# Patient Record
Sex: FEMALE | Race: WHITE | NOT HISPANIC OR LATINO | Employment: FULL TIME | URBAN - METROPOLITAN AREA
[De-identification: names, ages, dates, MRNs, and addresses within clinical notes are randomized per-mention and may not be internally consistent; named-entity substitution may affect disease eponyms.]

---
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
## 2022-01-10 ENCOUNTER — TELEPHONE (OUTPATIENT)
Dept: OBSTETRICS AND GYNECOLOGY | Facility: CLINIC | Age: 22
End: 2022-01-10
Payer: MEDICAID

## 2022-01-10 NOTE — TELEPHONE ENCOUNTER
----- Message from Micah Rueda sent at 1/10/2022  2:13 PM CST -----  Contact: self  Aye Caceres  MRN: [id number]  Home Phone      [phone]  Work Phone      Not on file.  Mobile          [phone]  Mobile          [phone]    Patient Care Team:  Tiffany Harding NP as PCP - General (Family Medicine)  Elizabeth Hardy LPN as Care Coordinator  OB? Yes, unknown  What phone number can you be reached at? [phone]  Message: Patient is needing to schedule dx preg appt. Please return call

## 2022-01-10 NOTE — TELEPHONE ENCOUNTER
Pt scheduled for 1/12/2022. Pt states she is a transfer ob, instructed to get records transferred.

## 2022-01-11 ENCOUNTER — PATIENT OUTREACH (OUTPATIENT)
Dept: ADMINISTRATIVE | Facility: OTHER | Age: 22
End: 2022-01-11
Payer: MEDICAID

## 2022-01-11 NOTE — PROGRESS NOTES
Health Maintenance Due   Topic Date Due    Hepatitis C Screening  Never done    COVID-19 Vaccine (1) Never done    HPV Vaccines (1 - 2-dose series) Never done    Influenza Vaccine (1) 09/01/2021     Updates were requested from care everywhere.  Chart was reviewed for overdue Proactive [facility] Encounters ([facility]) topics (CRS, Breast Cancer Screening, Eye exam)  Health Maintenance has been updated.  LINKS immunization registry triggered.  Immunizations were reconciled.

## 2022-01-12 ENCOUNTER — PATIENT MESSAGE (OUTPATIENT)
Dept: OBSTETRICS AND GYNECOLOGY | Facility: CLINIC | Age: 22
End: 2022-01-12

## 2022-01-12 ENCOUNTER — OFFICE VISIT (OUTPATIENT)
Dept: OBSTETRICS AND GYNECOLOGY | Facility: CLINIC | Age: 22
End: 2022-01-12
Payer: MEDICAID

## 2022-01-12 VITALS
HEART RATE: 87 BPM | SYSTOLIC BLOOD PRESSURE: 126 MMHG | HEIGHT: 65 IN | WEIGHT: 227.63 LBS | RESPIRATION RATE: 15 BRPM | DIASTOLIC BLOOD PRESSURE: 84 MMHG | BODY MASS INDEX: 37.92 KG/M2

## 2022-01-12 DIAGNOSIS — O99.340 ANXIETY DISORDER AFFECTING PREGNANCY, ANTEPARTUM: ICD-10-CM

## 2022-01-12 DIAGNOSIS — F41.9 ANXIETY DISORDER AFFECTING PREGNANCY, ANTEPARTUM: ICD-10-CM

## 2022-01-12 DIAGNOSIS — Z32.01 PREGNANCY EXAMINATION OR TEST, POSITIVE RESULT: Primary | ICD-10-CM

## 2022-01-12 DIAGNOSIS — O09.299 HISTORY OF PRE-ECLAMPSIA IN PRIOR PREGNANCY, CURRENTLY PREGNANT: ICD-10-CM

## 2022-01-12 PROCEDURE — 3079F DIAST BP 80-89 MM HG: CPT | Mod: CPTII,,, | Performed by: OBSTETRICS & GYNECOLOGY

## 2022-01-12 PROCEDURE — 99999 PR PBB SHADOW E&M-EST. PATIENT-LVL III: CPT | Mod: PBBFAC,,, | Performed by: OBSTETRICS & GYNECOLOGY

## 2022-01-12 PROCEDURE — 3008F PR BODY MASS INDEX (BMI) DOCUMENTED: ICD-10-PCS | Mod: CPTII,,, | Performed by: OBSTETRICS & GYNECOLOGY

## 2022-01-12 PROCEDURE — 1160F RVW MEDS BY RX/DR IN RCRD: CPT | Mod: CPTII,,, | Performed by: OBSTETRICS & GYNECOLOGY

## 2022-01-12 PROCEDURE — 3008F BODY MASS INDEX DOCD: CPT | Mod: CPTII,,, | Performed by: OBSTETRICS & GYNECOLOGY

## 2022-01-12 PROCEDURE — 1160F PR REVIEW ALL MEDS BY PRESCRIBER/CLIN PHARMACIST DOCUMENTED: ICD-10-PCS | Mod: CPTII,,, | Performed by: OBSTETRICS & GYNECOLOGY

## 2022-01-12 PROCEDURE — 3074F PR MOST RECENT SYSTOLIC BLOOD PRESSURE < 130 MM HG: ICD-10-PCS | Mod: CPTII,,, | Performed by: OBSTETRICS & GYNECOLOGY

## 2022-01-12 PROCEDURE — 1159F PR MEDICATION LIST DOCUMENTED IN MEDICAL RECORD: ICD-10-PCS | Mod: CPTII,,, | Performed by: OBSTETRICS & GYNECOLOGY

## 2022-01-12 PROCEDURE — 99213 OFFICE O/P EST LOW 20 MIN: CPT | Mod: PBBFAC,TH | Performed by: OBSTETRICS & GYNECOLOGY

## 2022-01-12 PROCEDURE — 1159F MED LIST DOCD IN RCRD: CPT | Mod: CPTII,,, | Performed by: OBSTETRICS & GYNECOLOGY

## 2022-01-12 PROCEDURE — 99204 PR OFFICE/OUTPT VISIT, NEW, LEVL IV, 45-59 MIN: ICD-10-PCS | Mod: S$PBB,TH,, | Performed by: OBSTETRICS & GYNECOLOGY

## 2022-01-12 PROCEDURE — 3074F SYST BP LT 130 MM HG: CPT | Mod: CPTII,,, | Performed by: OBSTETRICS & GYNECOLOGY

## 2022-01-12 PROCEDURE — 99999 PR PBB SHADOW E&M-EST. PATIENT-LVL III: ICD-10-PCS | Mod: PBBFAC,,, | Performed by: OBSTETRICS & GYNECOLOGY

## 2022-01-12 PROCEDURE — 3079F PR MOST RECENT DIASTOLIC BLOOD PRESSURE 80-89 MM HG: ICD-10-PCS | Mod: CPTII,,, | Performed by: OBSTETRICS & GYNECOLOGY

## 2022-01-12 PROCEDURE — 99204 OFFICE O/P NEW MOD 45 MIN: CPT | Mod: S$PBB,TH,, | Performed by: OBSTETRICS & GYNECOLOGY

## 2022-01-12 RX ORDER — SERTRALINE HYDROCHLORIDE 100 MG/1
50 TABLET, FILM COATED ORAL DAILY
Qty: 30 TABLET | Refills: 4 | Status: SHIPPED | OUTPATIENT
Start: 2022-01-12 | End: 2022-08-31

## 2022-01-12 RX ORDER — NAPROXEN SODIUM 220 MG/1
81 TABLET, FILM COATED ORAL DAILY
Qty: 30 TABLET | Refills: 10 | Status: SHIPPED | OUTPATIENT
Start: 2022-01-12 | End: 2022-07-20

## 2022-01-12 RX ORDER — ONDANSETRON 4 MG/1
4 TABLET, FILM COATED ORAL EVERY 8 HOURS PRN
COMMUNITY
Start: 2022-01-10

## 2022-01-12 RX ORDER — SERTRALINE HYDROCHLORIDE 50 MG/1
50 TABLET, FILM COATED ORAL DAILY
COMMUNITY
Start: 2022-01-05 | End: 2022-01-12 | Stop reason: SDUPTHER

## 2022-01-12 RX ORDER — LACTOBACILLUS COMBINATION NO.9 4B CELL
CAPSULE ORAL
COMMUNITY
End: 2023-01-17

## 2022-01-12 NOTE — PROGRESS NOTES
Subjective:   Patient ID: Aye Caceres is a 21 y.o. y.o. female.     Chief Complaint: Missed Menses       History of Present Illness:    Aye presents today complaining of amenorrhea. No LMP recorded. Patient is pregnant.. Prior menstrual cycles have been regular. She reports breast tenderness, fatigue, frequent urination, nausea and positive home pregnancy test. UPT is positive.   She has had initial OB care with Dr. Wiggins.  Reports normal bloodwork and pre-e labs. Reports ultrasound with EDC of 22.   She has a h/o anxiety and is on Zoloft, but is having increased anxiety. She is interested in increasing the dose.   Prior pregnancy with severe pre-e and emergent delivery by  at 30 WGA.     Past Medical History:   Diagnosis Date    Asthma      Past Surgical History:   Procedure Laterality Date     SECTION       Social History     Socioeconomic History    Marital status: Single   Tobacco Use    Smoking status: Never Smoker    Smokeless tobacco: Never Used   Substance and Sexual Activity    Alcohol use: No    Drug use: No    Sexual activity: Yes     Partners: Male     Birth control/protection: None     Comment: single      Family History   Problem Relation Age of Onset    Breast cancer Neg Hx     Colon cancer Neg Hx     Ovarian cancer Neg Hx      OB History    Para Term  AB Living   2 1 0 1 0 1   SAB IAB Ectopic Multiple Live Births   0 0 0 0 1      # Outcome Date GA Lbr Quinten/2nd Weight Sex Delivery Anes PTL Lv   2 Current            1  21 30w0d  1.247 kg (2 lb 12 oz) F CS-LTranv  Y MCKENZIE         ROS:   Review of Systems   Constitutional: Positive for fatigue. Negative for chills, diaphoresis, fever and unexpected weight change.   HENT: Negative for congestion, hearing loss, rhinorrhea and sore throat.    Eyes: Negative for pain, discharge and visual disturbance.   Respiratory: Negative for apnea, cough, shortness of breath and wheezing.     Cardiovascular: Negative for chest pain, palpitations and leg swelling.   Gastrointestinal: Positive for nausea. Negative for abdominal pain, constipation, diarrhea and vomiting.   Endocrine: Negative for cold intolerance and heat intolerance.   Genitourinary: Negative for difficulty urinating, dyspareunia, dysuria, flank pain, frequency, genital sores, hematuria, menstrual problem, pelvic pain, vaginal bleeding, vaginal discharge and vaginal pain.   Musculoskeletal: Negative for arthralgias, back pain and joint swelling.   Skin: Negative for rash.   Neurological: Negative for dizziness, weakness, light-headedness, numbness and headaches.   Psychiatric/Behavioral: Negative for agitation and confusion. The patient is nervous/anxious.            Objective:   Vital Signs:  Vitals:    01/12/22 1459   BP: 126/84   Pulse: 87   Resp: 15       Physical Exam  Vitals reviewed.   Constitutional:       General: She is not in acute distress.     Appearance: She is well-developed and well-nourished.   HENT:      Head: Normocephalic and atraumatic.   Eyes:      Conjunctiva/sclera: Conjunctivae normal.   Pulmonary:      Effort: Pulmonary effort is normal.   Abdominal:      Palpations: Abdomen is soft.      Tenderness: There is no abdominal tenderness. There is no guarding or rebound.   Musculoskeletal:      Cervical back: Normal range of motion and neck supple.      Right lower leg: No edema.      Left lower leg: No edema.   Skin:     General: Skin is warm and dry.   Neurological:      Mental Status: She is alert and oriented to person, place, and time.   Psychiatric:         Mood and Affect: Mood and affect normal.         Behavior: Behavior normal.         Thought Content: Thought content normal.         Judgment: Judgment normal.           Assessment:      1. Pregnancy examination or test, positive result    2. History of pre-eclampsia in prior pregnancy, currently pregnant    3. Anxiety disorder affecting pregnancy,  antepartum          Plan:        Pregnancy examination or test, positive result  -     aspirin 81 MG Chew; Take 1 tablet (81 mg total) by mouth once daily.  Dispense: 30 tablet; Refill: 10    History of pre-eclampsia in prior pregnancy, currently pregnant  -     aspirin 81 MG Chew; Take 1 tablet (81 mg total) by mouth once daily.  Dispense: 30 tablet; Refill: 10    Anxiety disorder affecting pregnancy, antepartum  -     sertraline (ZOLOFT) 100 MG tablet; Take 0.5 tablets (50 mg total) by mouth once daily.  Dispense: 30 tablet; Refill: 4        1. Records requested from prior OB  2. CT was positive per her report.  She and partner have been treated.  3. Pt reports ultrasound with EDC of 8/1/22  4. Start ASA 81 at 12 WGA.  5. Follow up in 4 weeks after ultrasound.    Patient was counseled today on proper weight gain based on the [place] [employer]'s recommendations based on her pre-pregnancy weight. Discussed foods to avoid in pregnancy (i.e. sushi, fish that are high in mercury, deli meat, and unpasteurized cheeses). Discussed prenatal vitamin options (i.e. stool softener, DHA). She was also counseled on safe, healthy behavior as well as medications safe in pregnancy.

## 2022-01-18 ENCOUNTER — LAB VISIT (OUTPATIENT)
Dept: PRIMARY CARE CLINIC | Facility: OTHER | Age: 22
End: 2022-01-18
Attending: INTERNAL MEDICINE
Payer: MEDICAID

## 2022-01-18 DIAGNOSIS — U07.1 COVID-19: Primary | ICD-10-CM

## 2022-01-18 LAB
CTP QC/QA: YES
SARS-COV-2 AG RESP QL IA.RAPID: NEGATIVE

## 2022-01-18 PROCEDURE — 87811 SARS-COV-2 COVID19 W/OPTIC: CPT

## 2022-01-18 NOTE — PROGRESS NOTES
Instructions for Patients with Confirmed or Suspected COVID-19    If you are awaiting your test result, you will either be called or it will be released to the patient portal.  If you have any questions about your test, please visit [email or web address] or call our COVID-19 information line at [phone].      Please isolate yourself at home.  You may leave home and/or return to work once the following conditions are met:    If you have symptoms and tested positive:   More than 5 days since symptoms first appeared AND   More than 24 hours fever free without medications AND       symptoms have improved   · For five days after ending isolation, masks are required.    If you had no symptoms but tested positive:   More than 5 days since the date of the first positive test. If you develop symptoms, then use the guidelines above  · For five days after ending isolation, masks are required.      Testing is not recommended if you are symptom free after completing isolation.

## 2022-01-19 ENCOUNTER — TELEPHONE (OUTPATIENT)
Dept: OBSTETRICS AND GYNECOLOGY | Facility: CLINIC | Age: 22
End: 2022-01-19
Payer: MEDICAID

## 2022-01-19 NOTE — TELEPHONE ENCOUNTER
Informed pt that we have not received her records from Dr. Wiggins. Informed pt that she can call the [facility] medical records to get them sent to us, pt v/u.

## 2022-01-19 NOTE — TELEPHONE ENCOUNTER
----- Message from Monica Hoover MA sent at 1/19/2022  2:41 PM CST -----  Contact: self  Aye Caceres  MRN: [id number]  Home Phone      [phone]  Work Phone      Not on file.  Mobile          [phone]  Mobile          [phone]    Patient Care Team:  Tiffany Harding NP as PCP - General (Family Medicine)  Elizabeth Hardy LPN as Care Coordinator  OB? Yes, 12w2d  What phone number can you be reached at? [phone]  Message:  Would like to know if we received medical records from previous ob doctor.

## 2022-02-10 ENCOUNTER — ROUTINE PRENATAL (OUTPATIENT)
Dept: OBSTETRICS AND GYNECOLOGY | Facility: CLINIC | Age: 22
End: 2022-02-10
Payer: MEDICAID

## 2022-02-10 ENCOUNTER — PATIENT MESSAGE (OUTPATIENT)
Dept: ADMINISTRATIVE | Facility: HOSPITAL | Age: 22
End: 2022-02-10
Payer: MEDICAID

## 2022-02-10 ENCOUNTER — LAB VISIT (OUTPATIENT)
Dept: LAB | Facility: HOSPITAL | Age: 22
End: 2022-02-10
Attending: OBSTETRICS & GYNECOLOGY
Payer: MEDICAID

## 2022-02-10 VITALS
HEART RATE: 65 BPM | BODY MASS INDEX: 37.97 KG/M2 | WEIGHT: 228.19 LBS | DIASTOLIC BLOOD PRESSURE: 72 MMHG | SYSTOLIC BLOOD PRESSURE: 104 MMHG

## 2022-02-10 DIAGNOSIS — O09.899 HISTORY OF MATERNAL CHLAMYDIA INFECTION, CURRENTLY PREGNANT: ICD-10-CM

## 2022-02-10 DIAGNOSIS — Z36.9 ANTENATAL SCREENING ENCOUNTER: ICD-10-CM

## 2022-02-10 DIAGNOSIS — Z34.82 ENCOUNTER FOR SUPERVISION OF OTHER NORMAL PREGNANCY, SECOND TRIMESTER: ICD-10-CM

## 2022-02-10 DIAGNOSIS — Z34.82 ENCOUNTER FOR SUPERVISION OF OTHER NORMAL PREGNANCY, SECOND TRIMESTER: Primary | ICD-10-CM

## 2022-02-10 PROCEDURE — 99212 OFFICE O/P EST SF 10 MIN: CPT | Mod: PBBFAC,TH | Performed by: OBSTETRICS & GYNECOLOGY

## 2022-02-10 PROCEDURE — 87491 CHLMYD TRACH DNA AMP PROBE: CPT | Performed by: OBSTETRICS & GYNECOLOGY

## 2022-02-10 PROCEDURE — 87591 N.GONORRHOEAE DNA AMP PROB: CPT | Performed by: OBSTETRICS & GYNECOLOGY

## 2022-02-10 PROCEDURE — 99999 PR PBB SHADOW E&M-EST. PATIENT-LVL II: CPT | Mod: PBBFAC,,, | Performed by: OBSTETRICS & GYNECOLOGY

## 2022-02-10 PROCEDURE — 99213 PR OFFICE/OUTPT VISIT, EST, LEVL III, 20-29 MIN: ICD-10-PCS | Mod: TH,S$PBB,, | Performed by: OBSTETRICS & GYNECOLOGY

## 2022-02-10 PROCEDURE — 99213 OFFICE O/P EST LOW 20 MIN: CPT | Mod: TH,S$PBB,, | Performed by: OBSTETRICS & GYNECOLOGY

## 2022-02-10 PROCEDURE — 36415 COLL VENOUS BLD VENIPUNCTURE: CPT | Performed by: OBSTETRICS & GYNECOLOGY

## 2022-02-10 PROCEDURE — 81511 FTL CGEN ABNOR FOUR ANAL: CPT | Performed by: OBSTETRICS & GYNECOLOGY

## 2022-02-10 PROCEDURE — 99999 PR PBB SHADOW E&M-EST. PATIENT-LVL II: ICD-10-PCS | Mod: PBBFAC,,, | Performed by: OBSTETRICS & GYNECOLOGY

## 2022-02-10 NOTE — PROGRESS NOTES
Patient doing well. No vaginal bleeding or cramping noted. Discussed the need for an anatomy scan between 19-20 weeks. Discussed quad screen.  Pt with low blood sugar in the evening, will increase snacking at that time.    Records requested from prior OB.   On ASA81  CT NICCI today.   RTC in 4 weeks.    Vitals signs, FHTs, urine dip, and PE findings documented, reviewed and available in OB flow chart.       I spent a total of 20 minutes on the day of the visit.This includes face to face time and non-face to face time preparing to see the patient (eg, review of tests), Obtaining and/or reviewing separately obtained history, Documenting clinical information in the electronic or other health record, Independently interpreting resultsand communicating results to the patient/family/caregiver, or Care coordination.      Coffective counseling sheet Get Ready discussed with mother. Reinforced avoiding induction of labor unless medically indicated as well as comfort measures during labor.  Encouraged mother to download Coffective mobile harman if she has not already done so. Mother verbalizes understanding.

## 2022-02-14 ENCOUNTER — OFFICE VISIT (OUTPATIENT)
Dept: URGENT CARE | Facility: CLINIC | Age: 22
End: 2022-02-14
Payer: MEDICAID

## 2022-02-14 VITALS
DIASTOLIC BLOOD PRESSURE: 67 MMHG | RESPIRATION RATE: 16 BRPM | BODY MASS INDEX: 37.99 KG/M2 | WEIGHT: 228 LBS | SYSTOLIC BLOOD PRESSURE: 105 MMHG | HEIGHT: 65 IN | OXYGEN SATURATION: 98 % | TEMPERATURE: 98 F | HEART RATE: 90 BPM

## 2022-02-14 DIAGNOSIS — R09.81 NASAL CONGESTION: Primary | ICD-10-CM

## 2022-02-14 LAB
# FETUSES US: NORMAL
2ND TRIMESTER 4 SCREEN PNL SERPL: NEGATIVE
2ND TRIMESTER 4 SCREEN SERPL-IMP: NORMAL
AFP MOM SERPL: 0.88
AFP SERPL-MCNC: 21.4 NG/ML
AGE AT DELIVERY: 20
B-HCG MOM SERPL: 0.82
B-HCG SERPL-ACNC: 30.6 IU/ML
C TRACH DNA SPEC QL NAA+PROBE: NOT DETECTED
CTP QC/QA: YES
FET TS 21 RISK FROM MAT AGE: NORMAL
GA (DAYS): 3 D
GA (WEEKS): 15 WK
GA METHOD: NORMAL
IDDM PATIENT QL: NORMAL
INHIBIN A MOM SERPL: 0.67
INHIBIN A SERPL-MCNC: 84.1 PG/ML
N GONORRHOEA DNA SPEC QL NAA+PROBE: NOT DETECTED
SARS-COV-2 RDRP RESP QL NAA+PROBE: NEGATIVE
SMOKING STATUS FTND: NORMAL
TS 18 RISK FETUS: NORMAL
TS 21 RISK FETUS: NORMAL
U ESTRIOL MOM SERPL: 1.1
U ESTRIOL SERPL-MCNC: 0.77 NG/ML

## 2022-02-14 PROCEDURE — 99213 OFFICE O/P EST LOW 20 MIN: CPT | Mod: S$GLB,,, | Performed by: FAMILY MEDICINE

## 2022-02-14 PROCEDURE — 3078F DIAST BP <80 MM HG: CPT | Mod: CPTII,S$GLB,, | Performed by: FAMILY MEDICINE

## 2022-02-14 PROCEDURE — U0002 COVID-19 LAB TEST NON-CDC: HCPCS | Mod: QW,S$GLB,, | Performed by: FAMILY MEDICINE

## 2022-02-14 PROCEDURE — 3008F PR BODY MASS INDEX (BMI) DOCUMENTED: ICD-10-PCS | Mod: CPTII,S$GLB,, | Performed by: FAMILY MEDICINE

## 2022-02-14 PROCEDURE — U0002: ICD-10-PCS | Mod: QW,S$GLB,, | Performed by: FAMILY MEDICINE

## 2022-02-14 PROCEDURE — 99213 PR OFFICE/OUTPT VISIT, EST, LEVL III, 20-29 MIN: ICD-10-PCS | Mod: S$GLB,,, | Performed by: FAMILY MEDICINE

## 2022-02-14 PROCEDURE — 3008F BODY MASS INDEX DOCD: CPT | Mod: CPTII,S$GLB,, | Performed by: FAMILY MEDICINE

## 2022-02-14 PROCEDURE — 3078F PR MOST RECENT DIASTOLIC BLOOD PRESSURE < 80 MM HG: ICD-10-PCS | Mod: CPTII,S$GLB,, | Performed by: FAMILY MEDICINE

## 2022-02-14 PROCEDURE — 1159F PR MEDICATION LIST DOCUMENTED IN MEDICAL RECORD: ICD-10-PCS | Mod: CPTII,S$GLB,, | Performed by: FAMILY MEDICINE

## 2022-02-14 PROCEDURE — 3074F SYST BP LT 130 MM HG: CPT | Mod: CPTII,S$GLB,, | Performed by: FAMILY MEDICINE

## 2022-02-14 PROCEDURE — 3074F PR MOST RECENT SYSTOLIC BLOOD PRESSURE < 130 MM HG: ICD-10-PCS | Mod: CPTII,S$GLB,, | Performed by: FAMILY MEDICINE

## 2022-02-14 PROCEDURE — 1160F RVW MEDS BY RX/DR IN RCRD: CPT | Mod: CPTII,S$GLB,, | Performed by: FAMILY MEDICINE

## 2022-02-14 PROCEDURE — 1159F MED LIST DOCD IN RCRD: CPT | Mod: CPTII,S$GLB,, | Performed by: FAMILY MEDICINE

## 2022-02-14 PROCEDURE — 1160F PR REVIEW ALL MEDS BY PRESCRIBER/CLIN PHARMACIST DOCUMENTED: ICD-10-PCS | Mod: CPTII,S$GLB,, | Performed by: FAMILY MEDICINE

## 2022-02-14 RX ORDER — FLUTICASONE PROPIONATE 50 MCG
1 SPRAY, SUSPENSION (ML) NASAL DAILY
Qty: 9.9 ML | Refills: 0 | Status: SHIPPED | OUTPATIENT
Start: 2022-02-14 | End: 2022-08-31

## 2022-02-14 NOTE — PATIENT INSTRUCTIONS
Patient Education       Viral Upper Respiratory Infection Discharge Instructions, Adult   About this topic   You have an upper respiratory infection or URI. A URI can affect your nose, throat, ears, and sinuses. A virus is the cause of almost all URIs and antibiotics will not help you feel better more quickly. The common cold is an example of a viral URI.  URIs are easy to spread from person to person, most often through coughing or sneezing. A URI will almost always get better in a week or two without any treatment.         What care is needed at home?   · Ask your doctor what you need to do when you go home. Make sure you ask questions if you do not understand what the doctor says.  · If you smoke, try to quit. Your doctor or nurse can help.  · Drink lots of fluids like water, juice, or broth. This will help replace any fluids lost if you have a runny nose or fever. Warm tea or soup can help soothe a sore throat.  · If the air in your home feels dry, use a cool mist humidifier. This can help a stuffy nose and make it easier to breathe.  · You can also use saline nose drops to relieve stuffiness.  · If you decide to take over-the-counter cough or cold medicines, follow the directions on the label carefully. Be sure you do not take more than 1 medicine that contains acetaminophen. Also, if you have a heart problem or high blood pressure, check with your doctor before you take any of these medicines.  · Wash your hands often. Cough or sneeze into a tissue or your elbow instead of your hands. This will help keep others healthy.  What follow-up care is needed?   Your doctor may ask you to make visits to the office to check on your progress. Be sure to keep these visits.  What drugs may be needed?   The doctor may order drugs to:  · Open up the tubes of your lungs  · Treat viral infection  · Relieve or stop coughing  · Help with pain from a sore throat  · Relieve runny and stuffy nose  · Provide oxygen  Will physical  activity be limited?   You need to rest for a few days to let your body recover from the infection.  What changes to diet are needed?   Eat soft foods like soup if swallowing is too painful.  What problems could happen?   · Asthma attack  · Sinus infections  · Lung problems like pneumonia and bronchitis  · Severe fluid loss. This is dehydration.  What can be done to prevent this health problem?   · Wash your hands often with soap and water for at least 20 seconds, especially after coughing or sneezing. Alcohol-based hand sanitizers also work to kill the virus.  · If you are sick, cover your mouth and nose with tissue when you cough or sneeze. You can also cough into your elbow. Throw away tissues in the trash and wash your hands after touching used tissues.  · Do not get too close (kissing, hugging) to people who are sick.  · Do not share towels or hankies with anyone who is sick. Clean commonly handled things like door handles, remotes, toys, and phones. Wipe them with a disinfectant.  · Stay away from crowded places.  · Cover your nose and mouth when you sneeze or cough.  · Take vitamin C to help build up your body's ability to fight disease.  · Get a flu shot each year.  When do I need to call the doctor?   · You have trouble breathing when talking or sitting still.  · You have a fever of 100.4°F (38°C) or higher for several days, chills, a very bad sore throat, or ear or sinus pain.  · You develop a new fever after several days of feeling the same or improving.  · You develop chest pain when you cough.  · You have a cough that lasts more than 10 days.  · You cough up blood, or the color of the mucus you cough up changes.  Teach Back: Helping You Understand   The Teach Back Method helps you understand the information we are giving you. After you talk with the staff, tell them in your own words what you learned. This helps to make sure the staff has described each thing clearly. It also helps to explain things  that may have been confusing. Before going home, make sure you can do these:  · I can tell you about my condition.  · I can tell you what may help ease my signs.  · I can tell you what I will do if I have a fever, chills, breathing very fast, or trouble breathing.  Where can I learn more?   American Lung Association  https://www.lung.org/blog/can-you-exercise-with-a-cold   American Lung Association  https://www.lung.org/lung-health-diseases/lung-disease-lookup/influenza/facts-about-the-common-cold   NHS Choices  https://www.nhs.uk/conditions/respiratory-tract-infection/   UpToDate  https://www.[employer]/contents/the-common-cold-in-adults-beyond-the-basics   Last Reviewed Date   2021-06-08  Consumer Information Use and Disclaimer   This information is not specific medical advice and does not replace information you receive from your health care provider. This is only a brief summary of general information. It does NOT include all information about conditions, illnesses, injuries, tests, procedures, treatments, therapies, discharge instructions or life-style choices that may apply to you. You must talk with your health care provider for complete information about your health and treatment options. This information should not be used to decide whether or not to accept your health care providers advice, instructions or recommendations. Only your health care provider has the knowledge and training to provide advice that is right for you.  Copyright   Copyright © 2021 UpToDate, Inc. and its affiliates and/or licensors. All rights reserved.

## 2022-02-14 NOTE — PROGRESS NOTES
"Subjective:       Patient ID: Aye Caceres is a 21 y.o. female.    Vitals:  height is 5' 5" (1.651 m) and weight is 103.4 kg (228 lb). Her temperature is 98.2 °F (36.8 °C). Her blood pressure is 105/67 and her pulse is 90. Her respiration is 16 and oxygen saturation is 98%.     Chief Complaint: Nasal Congestion (Entered by patient)    Pt presents nasal congestion since Saturday    Sinus Problem  This is a new problem. The current episode started in the past 7 days. The problem is unchanged. There has been no fever. Her pain is at a severity of 0/10. Associated symptoms include congestion. Treatments tried: Mucinex. The treatment provided no relief.       HENT: Positive for congestion.        Objective:      Physical Exam   Constitutional: She does not appear ill. No distress. obesity  HENT:   Nose: Rhinorrhea (clear) and congestion present.   Mouth/Throat: Mucous membranes are moist. No posterior oropharyngeal erythema.   Eyes: Pupils are equal, round, and reactive to light.      extraocular movement intact   Neck: Neck supple.   Cardiovascular: Normal rate, regular rhythm, normal heart sounds and normal pulses.   Pulmonary/Chest: Effort normal and breath sounds normal.   Abdominal: Normal appearance.   Neurological: She is alert.   Nursing note and vitals reviewed.    Results for orders placed or performed in visit on 02/14/22   POCT COVID-19 Rapid Screening   Result Value Ref Range    POC Rapid COVID Negative Negative     Acceptable Yes          Assessment:       1. Nasal congestion          Plan:         Nasal congestion  -     POCT COVID-19 Rapid Screening  -     fluticasone propionate (FLONASE) 50 mcg/actuation nasal spray; 1 spray (50 mcg total) by Each Nostril route once daily.  Dispense: 9.9 mL; Refill: 0    to discuss management for symptoms with OB               "

## 2022-02-14 NOTE — PROGRESS NOTES
Prenatal records obtained.    9.8>11.7/35.0<294  Utox negative  Urine culture negative  Urine P/C ratio 0.0  Chlamydia positive  Gonorrhea negative  RPR negative  HIV negative  Hep C negative  Hep B negative  A+/1  Rubella immune

## 2022-03-10 ENCOUNTER — PROCEDURE VISIT (OUTPATIENT)
Dept: OBSTETRICS AND GYNECOLOGY | Facility: CLINIC | Age: 22
End: 2022-03-10
Payer: MEDICAID

## 2022-03-10 ENCOUNTER — ROUTINE PRENATAL (OUTPATIENT)
Dept: OBSTETRICS AND GYNECOLOGY | Facility: CLINIC | Age: 22
End: 2022-03-10
Payer: MEDICAID

## 2022-03-10 VITALS
SYSTOLIC BLOOD PRESSURE: 112 MMHG | WEIGHT: 225.13 LBS | BODY MASS INDEX: 37.46 KG/M2 | DIASTOLIC BLOOD PRESSURE: 76 MMHG | HEART RATE: 82 BPM

## 2022-03-10 DIAGNOSIS — Z34.82 ENCOUNTER FOR SUPERVISION OF OTHER NORMAL PREGNANCY, SECOND TRIMESTER: ICD-10-CM

## 2022-03-10 DIAGNOSIS — Z34.82 ENCOUNTER FOR SUPERVISION OF OTHER NORMAL PREGNANCY, SECOND TRIMESTER: Primary | ICD-10-CM

## 2022-03-10 DIAGNOSIS — Z36.2 ENCOUNTER FOR FOLLOW-UP ULTRASOUND OF FETAL ANATOMY: ICD-10-CM

## 2022-03-10 DIAGNOSIS — Z3A.19 19 WEEKS GESTATION OF PREGNANCY: ICD-10-CM

## 2022-03-10 DIAGNOSIS — O09.292 HISTORY OF PRE-ECLAMPSIA IN PRIOR PREGNANCY, CURRENTLY PREGNANT IN SECOND TRIMESTER: ICD-10-CM

## 2022-03-10 PROCEDURE — 99999 PR PBB SHADOW E&M-EST. PATIENT-LVL III: ICD-10-PCS | Mod: PBBFAC,,, | Performed by: OBSTETRICS & GYNECOLOGY

## 2022-03-10 PROCEDURE — 99213 OFFICE O/P EST LOW 20 MIN: CPT | Mod: PBBFAC,TH | Performed by: OBSTETRICS & GYNECOLOGY

## 2022-03-10 PROCEDURE — 99213 PR OFFICE/OUTPT VISIT, EST, LEVL III, 20-29 MIN: ICD-10-PCS | Mod: TH,S$PBB,, | Performed by: OBSTETRICS & GYNECOLOGY

## 2022-03-10 PROCEDURE — 76805 OB US >/= 14 WKS SNGL FETUS: CPT | Mod: PBBFAC | Performed by: OBSTETRICS & GYNECOLOGY

## 2022-03-10 PROCEDURE — 99999 PR PBB SHADOW E&M-EST. PATIENT-LVL III: CPT | Mod: PBBFAC,,, | Performed by: OBSTETRICS & GYNECOLOGY

## 2022-03-10 PROCEDURE — 76805 US OB/GYN EXTENDED PROCEDURE (VIEWPOINT): ICD-10-PCS | Mod: 26,S$PBB,, | Performed by: OBSTETRICS & GYNECOLOGY

## 2022-03-10 PROCEDURE — 99213 OFFICE O/P EST LOW 20 MIN: CPT | Mod: TH,S$PBB,, | Performed by: OBSTETRICS & GYNECOLOGY

## 2022-03-10 NOTE — PROGRESS NOTES
Patient with no complaints. Denies vaginal bleeding or cramping.  Good FM. Discussed with patient having glucose testing for gestational diabetes preformed between 24-28 weeks.   ASA81 for h/o pre-e  Will schedule f/u anatomy in 4 weeks.   Requesting records from [facility]     RTC in 4 weeks.     Vitals signs, FHTs, urine dip, and PE findings documented, reviewed and available in OB flow chart.       I spent a total of 20 minutes on the day of the visit.This includes face to face time and non-face to face time preparing to see the patient (eg, review of tests), Obtaining and/or reviewing separately obtained history, Documenting clinical information in the electronic or other health record, Independently interpreting resultsand communicating results to the patient/family/caregiver, or Care coordination.     Coffective counseling sheet Learn Your Baby and Protect Breastfeeding discussed with mother. Instructed regarding feeding cues and methods to calm baby. Encouraged mother to download Coffective mobile harman if she has not already done so.  Mother verbalized understanding.

## 2022-04-04 ENCOUNTER — PATIENT MESSAGE (OUTPATIENT)
Dept: ADMINISTRATIVE | Facility: HOSPITAL | Age: 22
End: 2022-04-04
Payer: MEDICAID

## 2022-04-07 ENCOUNTER — ROUTINE PRENATAL (OUTPATIENT)
Dept: OBSTETRICS AND GYNECOLOGY | Facility: CLINIC | Age: 22
End: 2022-04-07
Payer: MEDICAID

## 2022-04-07 ENCOUNTER — PROCEDURE VISIT (OUTPATIENT)
Dept: OBSTETRICS AND GYNECOLOGY | Facility: CLINIC | Age: 22
End: 2022-04-07
Payer: MEDICAID

## 2022-04-07 VITALS
HEART RATE: 61 BPM | WEIGHT: 235.38 LBS | SYSTOLIC BLOOD PRESSURE: 140 MMHG | BODY MASS INDEX: 39.17 KG/M2 | DIASTOLIC BLOOD PRESSURE: 82 MMHG

## 2022-04-07 DIAGNOSIS — Z3A.23 23 WEEKS GESTATION OF PREGNANCY: ICD-10-CM

## 2022-04-07 DIAGNOSIS — Z36.2 ENCOUNTER FOR FOLLOW-UP ULTRASOUND OF FETAL ANATOMY: ICD-10-CM

## 2022-04-07 DIAGNOSIS — O99.210 OBESITY IN PREGNANCY: ICD-10-CM

## 2022-04-07 DIAGNOSIS — O09.292 HISTORY OF PRE-ECLAMPSIA IN PRIOR PREGNANCY, CURRENTLY PREGNANT IN SECOND TRIMESTER: ICD-10-CM

## 2022-04-07 DIAGNOSIS — Z36.4 ANTENATAL SCREENING FOR FETAL GROWTH RETARDATION USING ULTRASONICS: ICD-10-CM

## 2022-04-07 DIAGNOSIS — O16.2 ELEVATED BLOOD PRESSURE AFFECTING PREGNANCY IN SECOND TRIMESTER, ANTEPARTUM: ICD-10-CM

## 2022-04-07 DIAGNOSIS — Z34.82 ENCOUNTER FOR SUPERVISION OF OTHER NORMAL PREGNANCY, SECOND TRIMESTER: Primary | ICD-10-CM

## 2022-04-07 PROCEDURE — 99213 OFFICE O/P EST LOW 20 MIN: CPT | Mod: PBBFAC,TH | Performed by: OBSTETRICS & GYNECOLOGY

## 2022-04-07 PROCEDURE — 99213 PR OFFICE/OUTPT VISIT, EST, LEVL III, 20-29 MIN: ICD-10-PCS | Mod: TH,S$PBB,, | Performed by: OBSTETRICS & GYNECOLOGY

## 2022-04-07 PROCEDURE — 76816 OB US FOLLOW-UP PER FETUS: CPT | Mod: 26,S$PBB,, | Performed by: OBSTETRICS & GYNECOLOGY

## 2022-04-07 PROCEDURE — 99999 PR PBB SHADOW E&M-EST. PATIENT-LVL III: ICD-10-PCS | Mod: PBBFAC,,, | Performed by: OBSTETRICS & GYNECOLOGY

## 2022-04-07 PROCEDURE — 76816 PR  US,PREGNANT UTERUS,F/U,TRANSABD APP: ICD-10-PCS | Mod: 26,S$PBB,, | Performed by: OBSTETRICS & GYNECOLOGY

## 2022-04-07 PROCEDURE — 99213 OFFICE O/P EST LOW 20 MIN: CPT | Mod: TH,S$PBB,, | Performed by: OBSTETRICS & GYNECOLOGY

## 2022-04-07 PROCEDURE — 76816 OB US FOLLOW-UP PER FETUS: CPT | Mod: PBBFAC | Performed by: OBSTETRICS & GYNECOLOGY

## 2022-04-07 PROCEDURE — 99999 PR PBB SHADOW E&M-EST. PATIENT-LVL III: CPT | Mod: PBBFAC,,, | Performed by: OBSTETRICS & GYNECOLOGY

## 2022-04-07 NOTE — PROGRESS NOTES
Patient with occ HA and RUQ pain. Will obtain pre-e labs after visit. 24 hour urine ordered.  BP repeated was 128/78.  Pre-e precautions.  Denies vaginal bleeding or cramping.  Good FM. Discussed with patient having glucose testing for gestational diabetes preformed between 24-28 weeks. RTC in 4 weeks.     Vitals signs, FHTs, urine dip, and PE findings documented, reviewed and available in OB flow chart.       I spent a total of 20 minutes on the day of the visit.This includes face to face time and non-face to face time preparing to see the patient (eg, review of tests), Obtaining and/or reviewing separately obtained history, Documenting clinical information in the electronic or other health record, Independently interpreting resultsand communicating results to the patient/family/caregiver, or Care coordination.       I spent a total of 20 minutes on the day of the visit.This includes face to face time and non-face to face time preparing to see the patient (eg, review of tests), Obtaining and/or reviewing separately obtained history, Documenting clinical information in the electronic or other health record, Independently interpreting resultsand communicating results to the patient/family/caregiver, or Care coordination.     Coffective counseling sheet Nourish discussed with mother. Reinforced basic breastfeeding position and latch as well as proper hand expression technique and avoidance of artificial nipples and formula unless medically indicated. Encouraged mother to download Coffective mobile harman if she has not already done so.  Mother verbalizes understanding.

## 2022-04-11 ENCOUNTER — TELEPHONE (OUTPATIENT)
Dept: OBSTETRICS AND GYNECOLOGY | Facility: CLINIC | Age: 22
End: 2022-04-11
Payer: MEDICAID

## 2022-04-11 NOTE — TELEPHONE ENCOUNTER
----- Message from Monica Hoover MA sent at 4/11/2022 10:39 AM CDT -----  Contact: self  Aye Caceres  MRN: [id number]  Home Phone      [phone]  Work Phone      Not on file.  Mobile          [phone]  Mobile          [phone]    Patient Care Team:  Tiffany Harding NP as PCP - General (Family Medicine)  Elizabeth Hardy LPN as Care Coordinator  OB? Yes, 24w0d  What phone number can you be reached at? [phone]  Message: Needs to speak to nurse regarding lab work that is needing to be done and 24 hr urine.

## 2022-04-11 NOTE — TELEPHONE ENCOUNTER
Pt called to ask if she can have her blood drawn at [facility], informed pt that her orders are in the computer and she's able to have it done there. Pt also had c/o fatigue and lightheadedness, informed pt that this is common in pregnancy. Informed pt to avoid standing in one position for a long period of time and to change position slowly such as sitting to standing and to increase fluid intake,pt v/u.

## 2022-04-13 ENCOUNTER — TELEPHONE (OUTPATIENT)
Dept: OBSTETRICS AND GYNECOLOGY | Facility: CLINIC | Age: 22
End: 2022-04-13
Payer: MEDICAID

## 2022-04-13 NOTE — TELEPHONE ENCOUNTER
Please let Aye know that her labs look good - the levels that we worry about for pre-eclampsia were all normal.

## 2022-04-13 NOTE — TELEPHONE ENCOUNTER
----- Message from Monica Hoover MA sent at 4/13/2022  2:48 PM CDT -----  Contact: self  Aye Caceres  MRN: [id number]  Home Phone      [phone]  Work Phone      Not on file.  Mobile          [phone]      Patient Care Team:  Tiffany Harding NP as PCP - General (Family Medicine)  Elizabeth Hardy LPN as Care Coordinator  OB? Yes, 24w2d  What phone number can you be reached at? [phone]  Message: Has questions regarding lab results.

## 2022-05-05 ENCOUNTER — ROUTINE PRENATAL (OUTPATIENT)
Dept: OBSTETRICS AND GYNECOLOGY | Facility: CLINIC | Age: 22
End: 2022-05-05
Payer: MEDICAID

## 2022-05-05 ENCOUNTER — TELEPHONE (OUTPATIENT)
Dept: OBSTETRICS AND GYNECOLOGY | Facility: CLINIC | Age: 22
End: 2022-05-05
Payer: MEDICAID

## 2022-05-05 VITALS
DIASTOLIC BLOOD PRESSURE: 80 MMHG | SYSTOLIC BLOOD PRESSURE: 122 MMHG | HEART RATE: 111 BPM | WEIGHT: 237.88 LBS | BODY MASS INDEX: 39.59 KG/M2

## 2022-05-05 DIAGNOSIS — Z34.82 ENCOUNTER FOR SUPERVISION OF OTHER NORMAL PREGNANCY IN SECOND TRIMESTER: Primary | ICD-10-CM

## 2022-05-05 DIAGNOSIS — O09.292 HISTORY OF PRE-ECLAMPSIA IN PRIOR PREGNANCY, CURRENTLY PREGNANT IN SECOND TRIMESTER: ICD-10-CM

## 2022-05-05 DIAGNOSIS — Z23 NEED FOR DIPHTHERIA-TETANUS-PERTUSSIS (TDAP) VACCINE: ICD-10-CM

## 2022-05-05 DIAGNOSIS — Z3A.27 27 WEEKS GESTATION OF PREGNANCY: ICD-10-CM

## 2022-05-05 PROCEDURE — 99213 OFFICE O/P EST LOW 20 MIN: CPT | Mod: PBBFAC,TH | Performed by: OBSTETRICS & GYNECOLOGY

## 2022-05-05 PROCEDURE — 90715 TDAP VACCINE 7 YRS/> IM: CPT | Mod: PBBFAC

## 2022-05-05 PROCEDURE — 99999 PR PBB SHADOW E&M-EST. PATIENT-LVL III: CPT | Mod: PBBFAC,,, | Performed by: OBSTETRICS & GYNECOLOGY

## 2022-05-05 PROCEDURE — 99213 PR OFFICE/OUTPT VISIT, EST, LEVL III, 20-29 MIN: ICD-10-PCS | Mod: TH,S$PBB,, | Performed by: OBSTETRICS & GYNECOLOGY

## 2022-05-05 PROCEDURE — 99999 PR PBB SHADOW E&M-EST. PATIENT-LVL III: ICD-10-PCS | Mod: PBBFAC,,, | Performed by: OBSTETRICS & GYNECOLOGY

## 2022-05-05 PROCEDURE — 99213 OFFICE O/P EST LOW 20 MIN: CPT | Mod: TH,S$PBB,, | Performed by: OBSTETRICS & GYNECOLOGY

## 2022-05-05 NOTE — PROGRESS NOTES
Patient with no complaints. Denies vaginal bleeding or contractions. Good FM. Tdap discussed and ordered today.  Discussed fetal kick count instructions with patient to monitor fetal movement. RTC in 2 weeks.    Vitals signs, FHTs, urine dip, and PE findings documented, reviewed and available in OB flow chart.       I spent a total of 20 minutes on the day of the visit.This includes face to face time and non-face to face time preparing to see the patient (eg, review of tests), Obtaining and/or reviewing separately obtained history, Documenting clinical information in the electronic or other health record, Independently interpreting resultsand communicating results to the patient/family/caregiver, or Care coordination.     Coffective counseling sheet Keep Baby Close discussed with mother. Reinforced rooming in practices, continued skin to skin, and quiet hours as requested by mother.  Encouraged mother to download Coffective mobile harman if she has not already done so. Mother verbalizes understanding.

## 2022-05-06 ENCOUNTER — LAB VISIT (OUTPATIENT)
Dept: LAB | Facility: HOSPITAL | Age: 22
End: 2022-05-06
Attending: OBSTETRICS & GYNECOLOGY
Payer: MEDICAID

## 2022-05-06 DIAGNOSIS — Z34.82 ENCOUNTER FOR SUPERVISION OF OTHER NORMAL PREGNANCY IN SECOND TRIMESTER: ICD-10-CM

## 2022-05-06 LAB
BASOPHILS # BLD AUTO: 0.03 K/UL (ref 0–0.2)
BASOPHILS NFR BLD: 0.3 % (ref 0–1.9)
DIFFERENTIAL METHOD: ABNORMAL
EOSINOPHIL # BLD AUTO: 0.1 K/UL (ref 0–0.5)
EOSINOPHIL NFR BLD: 0.8 % (ref 0–8)
ERYTHROCYTE [DISTWIDTH] IN BLOOD BY AUTOMATED COUNT: 14.2 % (ref 11.5–14.5)
GLUCOSE SERPL-MCNC: 122 MG/DL (ref 70–140)
HCT VFR BLD AUTO: 32.1 % (ref 37–48.5)
HGB BLD-MCNC: 10 G/DL (ref 12–16)
IMM GRANULOCYTES # BLD AUTO: 0.02 K/UL (ref 0–0.04)
IMM GRANULOCYTES NFR BLD AUTO: 0.2 % (ref 0–0.5)
LYMPHOCYTES # BLD AUTO: 1.9 K/UL (ref 1–4.8)
LYMPHOCYTES NFR BLD: 19.3 % (ref 18–48)
MCH RBC QN AUTO: 26.5 PG (ref 27–31)
MCHC RBC AUTO-ENTMCNC: 31.2 G/DL (ref 32–36)
MCV RBC AUTO: 85 FL (ref 82–98)
MONOCYTES # BLD AUTO: 0.5 K/UL (ref 0.3–1)
MONOCYTES NFR BLD: 5.3 % (ref 4–15)
NEUTROPHILS # BLD AUTO: 7.3 K/UL (ref 1.8–7.7)
NEUTROPHILS NFR BLD: 74.1 % (ref 38–73)
NRBC BLD-RTO: 0 /100 WBC
PLATELET # BLD AUTO: 237 K/UL (ref 150–450)
PMV BLD AUTO: 9.3 FL (ref 9.2–12.9)
RBC # BLD AUTO: 3.78 M/UL (ref 4–5.4)
WBC # BLD AUTO: 9.88 K/UL (ref 3.9–12.7)

## 2022-05-06 PROCEDURE — 85025 COMPLETE CBC W/AUTO DIFF WBC: CPT | Performed by: OBSTETRICS & GYNECOLOGY

## 2022-05-06 PROCEDURE — 36415 COLL VENOUS BLD VENIPUNCTURE: CPT | Performed by: OBSTETRICS & GYNECOLOGY

## 2022-05-06 PROCEDURE — 86803 HEPATITIS C AB TEST: CPT | Performed by: OBSTETRICS & GYNECOLOGY

## 2022-05-06 PROCEDURE — 82950 GLUCOSE TEST: CPT | Performed by: OBSTETRICS & GYNECOLOGY

## 2022-05-12 ENCOUNTER — HOSPITAL ENCOUNTER (OUTPATIENT)
Facility: HOSPITAL | Age: 22
Discharge: HOME | End: 2022-05-12
Attending: OBSTETRICS & GYNECOLOGY | Admitting: OBSTETRICS & GYNECOLOGY
Payer: MEDICAID

## 2022-05-12 ENCOUNTER — PATIENT MESSAGE (OUTPATIENT)
Dept: OBSTETRICS AND GYNECOLOGY | Facility: CLINIC | Age: 22
End: 2022-05-12
Payer: MEDICAID

## 2022-05-12 VITALS
HEIGHT: 65 IN | HEART RATE: 76 BPM | RESPIRATION RATE: 18 BRPM | SYSTOLIC BLOOD PRESSURE: 110 MMHG | DIASTOLIC BLOOD PRESSURE: 58 MMHG | OXYGEN SATURATION: 98 % | BODY MASS INDEX: 39.49 KG/M2 | TEMPERATURE: 97 F | WEIGHT: 237 LBS

## 2022-05-12 PROBLEM — B34.9 VIRAL SYNDROME: Status: ACTIVE | Noted: 2022-05-12

## 2022-05-12 PROBLEM — O26.813 PREGNANCY RELATED FATIGUE IN THIRD TRIMESTER: Status: ACTIVE | Noted: 2022-05-12

## 2022-05-12 LAB
ALBUMIN SERPL BCP-MCNC: 2.8 G/DL (ref 3.5–5.2)
ALP SERPL-CCNC: 91 U/L (ref 55–135)
ALT SERPL W/O P-5'-P-CCNC: <5 U/L (ref 10–44)
ANION GAP SERPL CALC-SCNC: 10 MMOL/L (ref 8–16)
AST SERPL-CCNC: 10 U/L (ref 10–40)
BASOPHILS # BLD AUTO: 0.02 K/UL (ref 0–0.2)
BASOPHILS NFR BLD: 0.3 % (ref 0–1.9)
BILIRUB SERPL-MCNC: 0.2 MG/DL (ref 0.1–1)
BILIRUB UR QL STRIP: NEGATIVE
BUN SERPL-MCNC: 7 MG/DL (ref 6–20)
CALCIUM SERPL-MCNC: 8.8 MG/DL (ref 8.7–10.5)
CHLORIDE SERPL-SCNC: 105 MMOL/L (ref 95–110)
CLARITY UR: CLEAR
CO2 SERPL-SCNC: 21 MMOL/L (ref 23–29)
COLOR UR: YELLOW
CREAT SERPL-MCNC: 0.5 MG/DL (ref 0.5–1.4)
CREAT UR-MCNC: 186.2 MG/DL (ref 15–325)
DIFFERENTIAL METHOD: ABNORMAL
EOSINOPHIL # BLD AUTO: 0 K/UL (ref 0–0.5)
EOSINOPHIL NFR BLD: 0.3 % (ref 0–8)
ERYTHROCYTE [DISTWIDTH] IN BLOOD BY AUTOMATED COUNT: 14.2 % (ref 11.5–14.5)
EST. GFR  (AFRICAN AMERICAN): >60 ML/MIN/1.73 M^2
EST. GFR  (NON AFRICAN AMERICAN): >60 ML/MIN/1.73 M^2
GLUCOSE SERPL-MCNC: 89 MG/DL (ref 70–110)
GLUCOSE UR QL STRIP: NEGATIVE
HCT VFR BLD AUTO: 30.9 % (ref 37–48.5)
HCV AB SERPL QL IA: NEGATIVE
HGB BLD-MCNC: 9.7 G/DL (ref 12–16)
HGB UR QL STRIP: NEGATIVE
IMM GRANULOCYTES # BLD AUTO: 0.02 K/UL (ref 0–0.04)
IMM GRANULOCYTES NFR BLD AUTO: 0.3 % (ref 0–0.5)
KETONES UR QL STRIP: NEGATIVE
LEUKOCYTE ESTERASE UR QL STRIP: NEGATIVE
LYMPHOCYTES # BLD AUTO: 0.9 K/UL (ref 1–4.8)
LYMPHOCYTES NFR BLD: 12.3 % (ref 18–48)
MCH RBC QN AUTO: 26.5 PG (ref 27–31)
MCHC RBC AUTO-ENTMCNC: 31.4 G/DL (ref 32–36)
MCV RBC AUTO: 84 FL (ref 82–98)
MONOCYTES # BLD AUTO: 0.5 K/UL (ref 0.3–1)
MONOCYTES NFR BLD: 6.4 % (ref 4–15)
NEUTROPHILS # BLD AUTO: 5.9 K/UL (ref 1.8–7.7)
NEUTROPHILS NFR BLD: 80.4 % (ref 38–73)
NITRITE UR QL STRIP: NEGATIVE
NRBC BLD-RTO: 0 /100 WBC
PH UR STRIP: 6 [PH] (ref 5–8)
PLATELET # BLD AUTO: 221 K/UL (ref 150–450)
PMV BLD AUTO: 9.4 FL (ref 9.2–12.9)
POTASSIUM SERPL-SCNC: 4 MMOL/L (ref 3.5–5.1)
PROT SERPL-MCNC: 6.7 G/DL (ref 6–8.4)
PROT UR QL STRIP: NEGATIVE
PROT UR-MCNC: 21 MG/DL (ref 0–15)
PROT/CREAT UR: 0.11 MG/G{CREAT} (ref 0–0.2)
RBC # BLD AUTO: 3.66 M/UL (ref 4–5.4)
SODIUM SERPL-SCNC: 136 MMOL/L (ref 136–145)
SP GR UR STRIP: >=1.03 (ref 1–1.03)
URN SPEC COLLECT METH UR: ABNORMAL
UROBILINOGEN UR STRIP-ACNC: NEGATIVE EU/DL
WBC # BLD AUTO: 7.37 K/UL (ref 3.9–12.7)

## 2022-05-12 PROCEDURE — 85025 COMPLETE CBC W/AUTO DIFF WBC: CPT | Performed by: OBSTETRICS & GYNECOLOGY

## 2022-05-12 PROCEDURE — 80053 COMPREHEN METABOLIC PANEL: CPT | Performed by: OBSTETRICS & GYNECOLOGY

## 2022-05-12 PROCEDURE — 84156 ASSAY OF PROTEIN URINE: CPT | Performed by: OBSTETRICS & GYNECOLOGY

## 2022-05-12 PROCEDURE — 36415 COLL VENOUS BLD VENIPUNCTURE: CPT | Performed by: OBSTETRICS & GYNECOLOGY

## 2022-05-12 PROCEDURE — 99211 OFF/OP EST MAY X REQ PHY/QHP: CPT | Mod: 25

## 2022-05-12 PROCEDURE — 81003 URINALYSIS AUTO W/O SCOPE: CPT | Performed by: OBSTETRICS & GYNECOLOGY

## 2022-05-12 PROCEDURE — 87086 URINE CULTURE/COLONY COUNT: CPT | Performed by: OBSTETRICS & GYNECOLOGY

## 2022-05-12 PROCEDURE — 99219 PR INITIAL OBSERVATION CARE,LEVL II: ICD-10-PCS | Mod: ,,, | Performed by: OBSTETRICS & GYNECOLOGY

## 2022-05-12 PROCEDURE — 99219 PR INITIAL OBSERVATION CARE,LEVL II: CPT | Mod: ,,, | Performed by: OBSTETRICS & GYNECOLOGY

## 2022-05-12 NOTE — HPI
Patient presented to Labor and delivery stating she did not feel well.  She states she feels tired and weak and has a little bit of abdominal discomfort and possibly some shortness of breath.  Patient had preeclampsia with the last pregnancy at 30 weeks.  She was concerned.  Patient does however feel like she might be coming down with something but wanted to have fetal evaluation.

## 2022-05-12 NOTE — NURSING
0900- Patient presents to L&D from ER per wheelchair c/o weakness, SOB, and constant abdominal cramping since yesterday after work. She states she had an emergency  at 30 weeks last pregnancy for severe pre-eclampsia and states that she is feeling similar to that today.     1033- Notified Dr. Payan of patient's complaints and labs being resulted. MD states he will review labs.    1100- MD reviewed labs and good with discharge from OB. Patient states she does not want to be discharged for treatment to ER, she would like to be dischared home instead.    1117- Patient given oral and written instructions. Patient ambulated out of unit with mother at side to private car.

## 2022-05-12 NOTE — HOSPITAL COURSE
Patient monitored on Labor and delivery noted to have reactive NST.  No contractions noted.  Preeclamptic labs were evaluated and negative.  Patient request discharge home.  She states she has Zofran at home for any further viral syndromes at me present.

## 2022-05-12 NOTE — LETTER
May 12, 2022         [address] [address]  Phone: [phone]  Fax: [phone]       Patient: Aye Caceres   YOB: 2000  Date of Visit: 05/12/2022    To Whom It May Concern:    Tamia Caceres  was at [facility] on 05/12/2022. The patient may return to work/school on 5/16/2022 with no restrictions. If you have any questions or concerns, or if I can be of further assistance, please do not hesitate to contact me.    Sincerely,    Edwina Rocha RN

## 2022-05-12 NOTE — SUBJECTIVE & OBJECTIVE
Obstetric HPI:  Patient reports None contractions, active fetal movement, No vaginal bleeding , No loss of fluid     This pregnancy has been complicated by possible viral syndrome    OB History    Para Term  AB Living   2 1 0 1 0 1   SAB IAB Ectopic Multiple Live Births   0 0 0 0 1      # Outcome Date GA Lbr Quinten/2nd Weight Sex Delivery Anes PTL Lv   2 Current            1  21 30w0d  1.247 kg (2 lb 12 oz) F CS-LTranv  Y MCKENZIE     Past Medical History:   Diagnosis Date    Asthma      Past Surgical History:   Procedure Laterality Date     SECTION         PTA Medications   Medication Sig    aspirin 81 MG Chew Take 1 tablet (81 mg total) by mouth once daily.    [id number]-dha-epa-fish oil (PRENATAL GUMMY) 400 mcg-35 mg -25 mg-5 mg Chew Take by mouth.    albuterol 2.5 mg /3 mL (0.083 %) Nebu 3 mL, albuterol 5 mg/mL Nebu 0.5 mL Inhale into the lungs.    fluticasone propionate (FLONASE) 50 mcg/actuation nasal spray 1 spray (50 mcg total) by Each Nostril route once daily.    fluticasone propionate (FLONASE) 50 mcg/actuation nasal spray 1 spray (50 mcg total) by Each Nostril route once daily.    loratadine (CLARITIN) 10 mg tablet Take 1 tablet (10 mg total) by mouth once daily.    montelukast (SINGULAIR) 10 mg tablet Take 1 tablet (10 mg total) by mouth every evening.    omeprazole (PRILOSEC) 40 MG capsule Take 1 capsule (40 mg total) by mouth every morning. for 30 doses    ondansetron (ZOFRAN) 4 MG tablet Take 4 mg by mouth every 8 (eight) hours as needed.    ondansetron (ZOFRAN-ODT) 8 MG TbDL Take 1 tablet (8 mg total) by mouth 3 (three) times daily as needed.    sertraline (ZOLOFT) 100 MG tablet Take 0.5 tablets (50 mg total) by mouth once daily.       Review of patient's allergies indicates:  No Known Allergies     Family History    None       Tobacco Use    Smoking status: Never Smoker    Smokeless tobacco: Never Used   Substance and Sexual Activity    Alcohol use: No    Drug use: No     Sexual activity: Yes     Partners: Male     Birth control/protection: None     Comment: single      Review of Systems   Objective:     Vital Signs (Most Recent):  Temp: 97.4 °F (36.3 °C) (05/12/22 0946)  Pulse: 76 (05/12/22 1031)  Resp: 18 (05/12/22 0941)  BP: (!) 110/58 (05/12/22 1031)  SpO2: 98 % (05/12/22 1015)   Vital Signs (24h Range):  Temp:  [97.3 °F (36.3 °C)-97.4 °F (36.3 °C)] 97.4 °F (36.3 °C)  Pulse:  [74-90] 76  Resp:  [18] 18  SpO2:  [97 %-98 %] 98 %  BP: (105-116)/(57-60) 110/58     Weight: 107.5 kg (237 lb)  Body mass index is 39.44 kg/m².    FHT: Cat 1 (reassuring)  TOCO:  Q 0 minutes    Physical Exam      Significant Labs:  Lab Results   Component Value Date    GROUPTRH A POS 09/09/2020    HEPBSAG Negative 09/09/2020       I have personallly reviewed all pertinent lab results from the last 24 hours.

## 2022-05-12 NOTE — DISCHARGE INSTRUCTIONS
Return to the labor unit or call ob nurse at hospital if:    1.  Any questions whether the water bag broke or is leaking (sudden gush or suspected leak).   2.  If 35 wks or greater, no need to call about passing the mucous plug if no other signs of labor.  3.  You may have spotting for a day or two from the vaginal exam  4.  Please report heavier vaginal bleeding (requires you to put a pad on or blood is running down your leg)  5. Report if you are having more than 6 contractions in an hour and less than 37 weeks, but at 37-38 wks. and beyond you can time your contractions and notify doctor if they are longer, stronger and closer together  6.  You should call if you are experiencing sharp abdominal pains or severe cramping.    7.  If you are > or = 28 wks, Do fetal kick counts 2 times a day  &  report decreased fetal movement:  You should feel 10 movements in 2 hours or less.  Notify MD or OB nurse as soon as possible if you are not getting the required movements or if  it is taking you longer and longer to get the 10 movements and DO NOT WAIT UNTIL TOMORROW EVEN IF YOU HAVE AN APPT.  8.  Drink plenty of water and empty your bladder often, avoid caffeine & carbonated beverages as much as possible & avoid smoking  9.  Keep your appt. as scheduled    Office phone # [phone]  If after office hours, on the weekend, or unable to reach the nurse at the office, call the Hospital phone # [phone] & ask to speak to the OB nurse

## 2022-05-12 NOTE — H&P
[facility] - Labor & Delivery  Obstetrics  History & Physical    Patient Name: Aye Caceres  MRN: [id number]  Admission Date: 2022  Primary Care Provider: Tiffany Harding NP    Subjective:     Principal Problem:<principal problem not specified>    History of Present Illness:  Patient presented to Labor and delivery stating she did not feel well.  She states she feels tired and weak and has a little bit of abdominal discomfort and possibly some shortness of breath.  Patient had preeclampsia with the last pregnancy at 30 weeks.  She was concerned.  Patient does however feel like she might be coming down with something but wanted to have fetal evaluation.      Obstetric HPI:  Patient reports None contractions, active fetal movement, No vaginal bleeding , No loss of fluid     This pregnancy has been complicated by possible viral syndrome    OB History    Para Term  AB Living   2 1 0 1 0 1   SAB IAB Ectopic Multiple Live Births   0 0 0 0 1      # Outcome Date GA Lbr Quinten/2nd Weight Sex Delivery Anes PTL Lv   2 Current            1  21 30w0d  1.247 kg (2 lb 12 oz) F CS-LTranv  Y MCKENZIE     Past Medical History:   Diagnosis Date    Asthma      Past Surgical History:   Procedure Laterality Date     SECTION         PTA Medications   Medication Sig    aspirin 81 MG Chew Take 1 tablet (81 mg total) by mouth once daily.    PNV62-FA-om3-dha-epa-fish oil (PRENATAL GUMMY) 400 mcg-35 mg -25 mg-5 mg Chew Take by mouth.    albuterol 2.5 mg /3 mL (0.083 %) Nebu 3 mL, albuterol 5 mg/mL Nebu 0.5 mL Inhale into the lungs.    fluticasone propionate (FLONASE) 50 mcg/actuation nasal spray 1 spray (50 mcg total) by Each Nostril route once daily.    fluticasone propionate (FLONASE) 50 mcg/actuation nasal spray 1 spray (50 mcg total) by Each Nostril route once daily.    loratadine (CLARITIN) 10 mg tablet Take 1 tablet (10 mg total) by mouth once daily.    montelukast (SINGULAIR) 10 mg tablet Take  1 tablet (10 mg total) by mouth every evening.    omeprazole (PRILOSEC) 40 MG capsule Take 1 capsule (40 mg total) by mouth every morning. for 30 doses    ondansetron (ZOFRAN) 4 MG tablet Take 4 mg by mouth every 8 (eight) hours as needed.    ondansetron (ZOFRAN-ODT) 8 MG TbDL Take 1 tablet (8 mg total) by mouth 3 (three) times daily as needed.    sertraline (ZOLOFT) 100 MG tablet Take 0.5 tablets (50 mg total) by mouth once daily.       Review of patient's allergies indicates:  No Known Allergies     Family History    None       Tobacco Use    Smoking status: Never Smoker    Smokeless tobacco: Never Used   Substance and Sexual Activity    Alcohol use: No    Drug use: No    Sexual activity: Yes     Partners: Male     Birth control/protection: None     Comment: single      Review of Systems   Objective:     Vital Signs (Most Recent):  Temp: 97.4 °F (36.3 °C) (22 0946)  Pulse: 76 (22 1031)  Resp: 18 (22 0941)  BP: (!) 110/58 (22 1031)  SpO2: 98 % (22 1015)   Vital Signs (24h Range):  Temp:  [97.3 °F (36.3 °C)-97.4 °F (36.3 °C)] 97.4 °F (36.3 °C)  Pulse:  [74-90] 76  Resp:  [18] 18  SpO2:  [97 %-98 %] 98 %  BP: (105-116)/(57-60) 110/58     Weight: 107.5 kg (237 lb)  Body mass index is 39.44 kg/m².    FHT: Cat 1 (reassuring)  TOCO:  Q 0 minutes    Physical Exam      Significant Labs:  Lab Results   Component Value Date    GROUPTRH A POS 2020    HEPBSAG Negative 2020       I have personallly reviewed all pertinent lab results from the last 24 hours.    Assessment/Plan:     21 y.o. female  at 28w3d for:    No notes have been filed under this hospital service.  Service: Obstetrics and Gynecology      Brando Payan MD  Obstetrics  [facility] - Labor & Delivery

## 2022-05-12 NOTE — TELEPHONE ENCOUNTER
"Pt called this morning and complained of shortness of breath, BP's of  100-90's systolic and 60-70 diastolic, fatigue, and vertigo since yesterday. Pt also complained "constant" abdominal pains since last night. Pain scale of 7 per pt. Pt is staying hydrated per pt. Pt instructed to go to ER. Pt voiced understanding.  "

## 2022-05-12 NOTE — DISCHARGE SUMMARY
[facility] - Labor & Delivery  Obstetrics  Discharge Summary      Patient Name: Aye Caceres  MRN: [id number]  Admission Date: 5/12/2022  Hospital Length of Stay: 0 days  Discharge Date and Time:  05/12/2022 11:06 AM  Attending Physician: Brando Payan MD   Discharging Provider: Brando Payan MD   Primary Care Provider: Tiffany Harding NP    HPI: Patient presented to Labor and delivery stating she did not feel well.  She states she feels tired and weak and has a little bit of abdominal discomfort and possibly some shortness of breath.  Patient had preeclampsia with the last pregnancy at 30 weeks.  She was concerned.  Patient does however feel like she might be coming down with something but wanted to have fetal evaluation.      FHT: Cat 1 (reassuring)  TOCO:  Q 0 minutes    * No surgery found *     Hospital Course:   Patient monitored on Labor and delivery noted to have reactive NST.  No contractions noted.  Preeclamptic labs were evaluated and negative.  Patient request discharge home.  She states she has Zofran at home for any further viral syndromes at me present.           Final Active Diagnoses:    Diagnosis Date Noted POA    Pregnancy related fatigue in third trimester [O26.813] 05/12/2022 Yes    Viral syndrome [B34.9] 05/12/2022 Yes      Problems Resolved During this Admission:        Significant Diagnostic Studies:           Immunizations     Date Immunization Status Dose Route/Site Given by    05/05/22 1433 Tdap Given 0.5 mL Intramuscular/Right deltoid Emy Astorga LPN          This patient has no babies on file.  Pending Diagnostic Studies:     None          Discharged Condition: good    Disposition: Home or Self Care    Follow Up:    Patient Instructions:   No discharge procedures on file.  Medications:  Current Discharge Medication List      CONTINUE these medications which have NOT CHANGED    Details   aspirin 81 MG Chew Take 1 tablet (81 mg total) by mouth once daily.  Qty: 30 tablet,  Refills: 10    Associated Diagnoses: Pregnancy examination or test, positive result; History of pre-eclampsia in prior pregnancy, currently pregnant      PNV62-FA-om3-dha-epa-fish oil (PRENATAL GUMMY) 400 mcg-35 mg -25 mg-5 mg Chew Take by mouth.      albuterol 2.5 mg /3 mL (0.083 %) Nebu 3 mL, albuterol 5 mg/mL Nebu 0.5 mL Inhale into the lungs.      !! fluticasone propionate (FLONASE) 50 mcg/actuation nasal spray 1 spray (50 mcg total) by Each Nostril route once daily.  Qty: 16 g, Refills: 3    Associated Diagnoses: Nasal congestion      !! fluticasone propionate (FLONASE) 50 mcg/actuation nasal spray 1 spray (50 mcg total) by Each Nostril route once daily.  Qty: 9.9 mL, Refills: 0    Associated Diagnoses: Nasal congestion      loratadine (CLARITIN) 10 mg tablet Take 1 tablet (10 mg total) by mouth once daily.  Qty: 30 tablet, Refills: 5    Associated Diagnoses: Allergic sinusitis      montelukast (SINGULAIR) 10 mg tablet Take 1 tablet (10 mg total) by mouth every evening.  Qty: 30 tablet, Refills: 5    Associated Diagnoses: Mild intermittent asthma without complication      omeprazole (PRILOSEC) 40 MG capsule Take 1 capsule (40 mg total) by mouth every morning. for 30 doses  Qty: 30 capsule, Refills: 5    Associated Diagnoses: Colicky RUQ abdominal pain      ondansetron (ZOFRAN) 4 MG tablet Take 4 mg by mouth every 8 (eight) hours as needed.      ondansetron (ZOFRAN-ODT) 8 MG TbDL Take 1 tablet (8 mg total) by mouth 3 (three) times daily as needed.  Qty: 30 tablet, Refills: 0    Associated Diagnoses: Colicky RUQ abdominal pain      sertraline (ZOLOFT) 100 MG tablet Take 0.5 tablets (50 mg total) by mouth once daily.  Qty: 30 tablet, Refills: 4    Associated Diagnoses: Anxiety disorder affecting pregnancy, antepartum       !! - Potential duplicate medications found. Please discuss with provider.          Brando Payan MD  Obstetrics  [facility] [facility] - Labor & Delivery

## 2022-05-13 LAB
BACTERIA UR CULT: NORMAL
BACTERIA UR CULT: NORMAL

## 2022-05-20 ENCOUNTER — ROUTINE PRENATAL (OUTPATIENT)
Dept: OBSTETRICS AND GYNECOLOGY | Facility: CLINIC | Age: 22
End: 2022-05-20
Payer: MEDICAID

## 2022-05-20 VITALS
BODY MASS INDEX: 39.94 KG/M2 | DIASTOLIC BLOOD PRESSURE: 76 MMHG | SYSTOLIC BLOOD PRESSURE: 114 MMHG | WEIGHT: 240 LBS | HEART RATE: 102 BPM

## 2022-05-20 DIAGNOSIS — Z36.2 ENCOUNTER FOR FOLLOW-UP ULTRASOUND OF FETAL ANATOMY: ICD-10-CM

## 2022-05-20 DIAGNOSIS — O44.43 LOW LYING PLACENTA NOS OR WITHOUT HEMORRHAGE, THIRD TRIMESTER: ICD-10-CM

## 2022-05-20 DIAGNOSIS — O09.893 SUPERVISION OF OTHER HIGH RISK PREGNANCIES, THIRD TRIMESTER: Primary | ICD-10-CM

## 2022-05-20 DIAGNOSIS — Z3A.29 29 WEEKS GESTATION OF PREGNANCY: ICD-10-CM

## 2022-05-20 PROCEDURE — 99213 OFFICE O/P EST LOW 20 MIN: CPT | Mod: PBBFAC,TH | Performed by: OBSTETRICS & GYNECOLOGY

## 2022-05-20 PROCEDURE — 99999 PR PBB SHADOW E&M-EST. PATIENT-LVL III: CPT | Mod: PBBFAC,,, | Performed by: OBSTETRICS & GYNECOLOGY

## 2022-05-20 PROCEDURE — 99213 OFFICE O/P EST LOW 20 MIN: CPT | Mod: TH,S$PBB,, | Performed by: OBSTETRICS & GYNECOLOGY

## 2022-05-20 PROCEDURE — 99213 PR OFFICE/OUTPT VISIT, EST, LEVL III, 20-29 MIN: ICD-10-PCS | Mod: TH,S$PBB,, | Performed by: OBSTETRICS & GYNECOLOGY

## 2022-05-20 PROCEDURE — 99999 PR PBB SHADOW E&M-EST. PATIENT-LVL III: ICD-10-PCS | Mod: PBBFAC,,, | Performed by: OBSTETRICS & GYNECOLOGY

## 2022-05-20 NOTE — PROGRESS NOTES
Patient with no complaints. Denies vaginal bleeding or contractions. Good FM. Growth scan ordered for next visit.     Vitals signs, FHTs, urine dip, and PE findings documented, reviewed and available in OB flow chart.       I spent a total of 20 minutes on the day of the visit.This includes face to face time and non-face to face time preparing to see the patient (eg, review of tests), Obtaining and/or reviewing separately obtained history, Documenting clinical information in the electronic or other health record, Independently interpreting resultsand communicating results to the patient/family/caregiver, or Care coordination.     Co[employer]ive Motivation Document discussed with mother. Reinforced benefits of breastfeeding, risk of formula, and cue based feedings. Encouraged mother to download [employer] mobile harman if she has not already done so. Mother verbalizes understanding.

## 2022-05-24 ENCOUNTER — PATIENT MESSAGE (OUTPATIENT)
Dept: OBSTETRICS AND GYNECOLOGY | Facility: CLINIC | Age: 22
End: 2022-05-24
Payer: MEDICAID

## 2022-06-02 ENCOUNTER — TELEPHONE (OUTPATIENT)
Dept: OBSTETRICS AND GYNECOLOGY | Facility: CLINIC | Age: 22
End: 2022-06-02
Payer: MEDICAID

## 2022-06-02 ENCOUNTER — PROCEDURE VISIT (OUTPATIENT)
Dept: OBSTETRICS AND GYNECOLOGY | Facility: CLINIC | Age: 22
End: 2022-06-02
Payer: MEDICAID

## 2022-06-02 ENCOUNTER — ROUTINE PRENATAL (OUTPATIENT)
Dept: OBSTETRICS AND GYNECOLOGY | Facility: CLINIC | Age: 22
End: 2022-06-02
Payer: MEDICAID

## 2022-06-02 VITALS
WEIGHT: 242.88 LBS | DIASTOLIC BLOOD PRESSURE: 82 MMHG | HEART RATE: 74 BPM | SYSTOLIC BLOOD PRESSURE: 120 MMHG | BODY MASS INDEX: 40.42 KG/M2

## 2022-06-02 DIAGNOSIS — Z36.4 ULTRASOUND FOR ANTENATAL SCREENING FOR FETAL GROWTH RESTRICTION: ICD-10-CM

## 2022-06-02 DIAGNOSIS — Z3A.31 31 WEEKS GESTATION OF PREGNANCY: ICD-10-CM

## 2022-06-02 DIAGNOSIS — O44.43 LOW LYING PLACENTA NOS OR WITHOUT HEMORRHAGE, THIRD TRIMESTER: ICD-10-CM

## 2022-06-02 DIAGNOSIS — O44.53 LOW LYING PLACENTA WITH HEMORRHAGE, THIRD TRIMESTER: ICD-10-CM

## 2022-06-02 DIAGNOSIS — Z34.83 ENCOUNTER FOR SUPERVISION OF OTHER NORMAL PREGNANCY, THIRD TRIMESTER: Primary | ICD-10-CM

## 2022-06-02 DIAGNOSIS — O09.893 SUPERVISION OF OTHER HIGH RISK PREGNANCIES, THIRD TRIMESTER: Primary | ICD-10-CM

## 2022-06-02 DIAGNOSIS — O09.893 SUPERVISION OF OTHER HIGH RISK PREGNANCIES, THIRD TRIMESTER: ICD-10-CM

## 2022-06-02 PROCEDURE — 99213 PR OFFICE/OUTPT VISIT, EST, LEVL III, 20-29 MIN: ICD-10-PCS | Mod: TH,S$PBB,, | Performed by: OBSTETRICS & GYNECOLOGY

## 2022-06-02 PROCEDURE — 99999 PR PBB SHADOW E&M-EST. PATIENT-LVL III: ICD-10-PCS | Mod: PBBFAC,,, | Performed by: OBSTETRICS & GYNECOLOGY

## 2022-06-02 PROCEDURE — 76816 US OB/GYN EXTENDED PROCEDURE (VIEWPOINT): ICD-10-PCS | Mod: 26,S$PBB,, | Performed by: OBSTETRICS & GYNECOLOGY

## 2022-06-02 PROCEDURE — 99213 OFFICE O/P EST LOW 20 MIN: CPT | Mod: PBBFAC,TH | Performed by: OBSTETRICS & GYNECOLOGY

## 2022-06-02 PROCEDURE — 76817 TRANSVAGINAL US OBSTETRIC: CPT | Mod: 26,S$PBB,, | Performed by: OBSTETRICS & GYNECOLOGY

## 2022-06-02 PROCEDURE — 76816 OB US FOLLOW-UP PER FETUS: CPT | Mod: PBBFAC | Performed by: OBSTETRICS & GYNECOLOGY

## 2022-06-02 PROCEDURE — 99213 OFFICE O/P EST LOW 20 MIN: CPT | Mod: TH,S$PBB,, | Performed by: OBSTETRICS & GYNECOLOGY

## 2022-06-02 PROCEDURE — 99999 PR PBB SHADOW E&M-EST. PATIENT-LVL III: CPT | Mod: PBBFAC,,, | Performed by: OBSTETRICS & GYNECOLOGY

## 2022-06-02 PROCEDURE — 76817 US OB/GYN EXTENDED PROCEDURE (VIEWPOINT): ICD-10-PCS | Mod: 26,S$PBB,, | Performed by: OBSTETRICS & GYNECOLOGY

## 2022-06-02 NOTE — PROGRESS NOTES
Patient with no complaints. Denies vaginal bleeding or contractions. Good FM. Growth scan reviewed; patient with low lying placenta.  Will follow up with MFM per MFM recs in 2 weeks. RTC in 2 weeks.     Vitals signs, FHTs, urine dip, and PE findings documented, reviewed and available in OB flow chart.       I spent a total of 20 minutes on the day of the visit.This includes face to face time and non-face to face time preparing to see the patient (eg, review of tests), Obtaining and/or reviewing separately obtained history, Documenting clinical information in the electronic or other health record, Independently interpreting resultsand communicating results to the patient/family/caregiver, or Care coordination.     Coffective counseling sheet Build Your Team discussed with mother. Reinforced importance of early identification of support team including champion, OB provider, pediatrician and local community resources. Encouraged mother to download [employer] mobile harman if she has not already done so.  Mother verbalizes understanding. Also discussed quiet time and delayed bathing.

## 2022-06-06 ENCOUNTER — PATIENT MESSAGE (OUTPATIENT)
Dept: OBSTETRICS AND GYNECOLOGY | Facility: CLINIC | Age: 22
End: 2022-06-06
Payer: MEDICAID

## 2022-06-20 ENCOUNTER — PATIENT MESSAGE (OUTPATIENT)
Dept: MATERNAL FETAL MEDICINE | Facility: CLINIC | Age: 22
End: 2022-06-20
Payer: MEDICAID

## 2022-06-21 ENCOUNTER — PROCEDURE VISIT (OUTPATIENT)
Dept: MATERNAL FETAL MEDICINE | Facility: CLINIC | Age: 22
End: 2022-06-21
Payer: MEDICAID

## 2022-06-21 ENCOUNTER — TELEPHONE (OUTPATIENT)
Dept: OBSTETRICS AND GYNECOLOGY | Facility: CLINIC | Age: 22
End: 2022-06-21
Payer: MEDICAID

## 2022-06-21 ENCOUNTER — OFFICE VISIT (OUTPATIENT)
Dept: MATERNAL FETAL MEDICINE | Facility: CLINIC | Age: 22
End: 2022-06-21
Payer: MEDICAID

## 2022-06-21 VITALS
BODY MASS INDEX: 40.14 KG/M2 | SYSTOLIC BLOOD PRESSURE: 115 MMHG | WEIGHT: 240.94 LBS | DIASTOLIC BLOOD PRESSURE: 73 MMHG | HEIGHT: 65 IN

## 2022-06-21 DIAGNOSIS — O34.219 HISTORY OF CESAREAN DELIVERY, CURRENTLY PREGNANT: Primary | ICD-10-CM

## 2022-06-21 DIAGNOSIS — O44.53 LOW LYING PLACENTA WITH HEMORRHAGE, THIRD TRIMESTER: ICD-10-CM

## 2022-06-21 DIAGNOSIS — O44.43 LOW LYING PLACENTA NOS OR WITHOUT HEMORRHAGE, THIRD TRIMESTER: ICD-10-CM

## 2022-06-21 DIAGNOSIS — O44.40 LOW-LYING PLACENTA: ICD-10-CM

## 2022-06-21 PROCEDURE — 99999 PR PBB SHADOW E&M-EST. PATIENT-LVL III: ICD-10-PCS | Mod: PBBFAC,,, | Performed by: OBSTETRICS & GYNECOLOGY

## 2022-06-21 PROCEDURE — 3078F DIAST BP <80 MM HG: CPT | Mod: CPTII,,, | Performed by: OBSTETRICS & GYNECOLOGY

## 2022-06-21 PROCEDURE — 99999 PR PBB SHADOW E&M-EST. PATIENT-LVL III: CPT | Mod: PBBFAC,,, | Performed by: OBSTETRICS & GYNECOLOGY

## 2022-06-21 PROCEDURE — 76817 TRANSVAGINAL US OBSTETRIC: CPT | Mod: PBBFAC | Performed by: OBSTETRICS & GYNECOLOGY

## 2022-06-21 PROCEDURE — 3074F SYST BP LT 130 MM HG: CPT | Mod: CPTII,,, | Performed by: OBSTETRICS & GYNECOLOGY

## 2022-06-21 PROCEDURE — 3078F PR MOST RECENT DIASTOLIC BLOOD PRESSURE < 80 MM HG: ICD-10-PCS | Mod: CPTII,,, | Performed by: OBSTETRICS & GYNECOLOGY

## 2022-06-21 PROCEDURE — 3008F PR BODY MASS INDEX (BMI) DOCUMENTED: ICD-10-PCS | Mod: CPTII,,, | Performed by: OBSTETRICS & GYNECOLOGY

## 2022-06-21 PROCEDURE — 99213 OFFICE O/P EST LOW 20 MIN: CPT | Mod: PBBFAC,TH | Performed by: OBSTETRICS & GYNECOLOGY

## 2022-06-21 PROCEDURE — 76817 US MFM PROCEDURE (VIEWPOINT): ICD-10-PCS | Mod: 26,S$PBB,, | Performed by: OBSTETRICS & GYNECOLOGY

## 2022-06-21 PROCEDURE — 3074F PR MOST RECENT SYSTOLIC BLOOD PRESSURE < 130 MM HG: ICD-10-PCS | Mod: CPTII,,, | Performed by: OBSTETRICS & GYNECOLOGY

## 2022-06-21 PROCEDURE — 99214 OFFICE O/P EST MOD 30 MIN: CPT | Mod: S$PBB,TH,25, | Performed by: OBSTETRICS & GYNECOLOGY

## 2022-06-21 PROCEDURE — 3008F BODY MASS INDEX DOCD: CPT | Mod: CPTII,,, | Performed by: OBSTETRICS & GYNECOLOGY

## 2022-06-21 PROCEDURE — 1159F MED LIST DOCD IN RCRD: CPT | Mod: CPTII,,, | Performed by: OBSTETRICS & GYNECOLOGY

## 2022-06-21 PROCEDURE — 1159F PR MEDICATION LIST DOCUMENTED IN MEDICAL RECORD: ICD-10-PCS | Mod: CPTII,,, | Performed by: OBSTETRICS & GYNECOLOGY

## 2022-06-21 PROCEDURE — 99214 PR OFFICE/OUTPT VISIT, EST, LEVL IV, 30-39 MIN: ICD-10-PCS | Mod: S$PBB,TH,25, | Performed by: OBSTETRICS & GYNECOLOGY

## 2022-06-21 NOTE — PROGRESS NOTES
Low-lying placenta  Ultrasound Impression:  Hand, living IUP.  The anatomic survey remains incomplete.  The visualized anatomy appears normal.  Normal AFV.  Imaging is suboptimal to confirm resolution of previous low-lying placenta; however, on limited views, there does not appear to be a vasa previa.  Prior studies reviewed. On 3/10, caudal edge of placenta was 19 mm from internal cervical os.    Counseling:  Patient referred for inability to diagnose resolved low-lying placenta and to rule out presence of cord or fetal vessels near maternal cervix.  Views are limited today due to fetal head position; however, on images obtained, there is no concern for vasa previa.  I reviewed the previous studies; at diagnosis, the placenta was 19 mm from the internal os. The vast majority of low-lying placentas in this position resolve prior to term.  We discussed delivery options. Given the inability to clear the position of the placenta, it is reasonable to proceed with a planned repeat  delivery. Should the patient decide she strongly desires an attempt at vaginal delivery, a repeat ultrasound attempt can be pursued at 36 weeks. We discussed that the position of the fetal head may preclude optimal imaging at that time.  We discussed delivery timing. [facility] recommends delivery in the setting of a low-lying placenta at 36-37 6/7 weeks gestation. Given the uncertainty about the persistence of the low-lying placenta and the potential for  complications with early term delivery, I reviewed the possibility of delivery a little later between 37-38 weeks via repeat ; however, patient can proceed with delivery at 36-37 6/7 weeks if desired by patient or primary OB.   Should there be any clinical concern for bleeding, please discuss with [facility] as delivery should be undertaken earlier.    Recommendations:   Consider delivery via repeat  at 37-38 weeks. Given the inability to conclusively confirm  resolution of the previous noted low lying placenta, delivery by  is recommended.   If strongly desires attempt at vaginal birth, a  repeat assessment can be performed at 36 weeks; however, it is likely that the fetal head position will obscure imaging.   Please see above for counseling.      Time  I overall spent approximately 30 minutes in face to face time with the patient and her family, greater than 50% of which was in counseling and care coordination.

## 2022-06-21 NOTE — ASSESSMENT & PLAN NOTE
Ultrasound Impression:  Hand, living IUP.  The anatomic survey remains incomplete.  The visualized anatomy appears normal.  Normal AFV.  Imaging is suboptimal to confirm resolution of previous low-lying placenta; however, on limited views, there does not appear to be a vasa previa.  Prior studies reviewed. On 3/10, caudal edge of placenta was 19 mm from internal cervical os.    Counseling:  Patient referred for inability to diagnose resolved low-lying placenta and to rule out presence of cord or fetal vessels near maternal cervix.  Views are limited today due to fetal head position; however, on images obtained, there is no concern for vasa previa.  I reviewed the previous studies; at diagnosis, the placenta was 19 mm from the internal os. The vast majority of low-lying placentas in this position resolve prior to term.  We discussed delivery options. Given the inability to clear the position of the placenta, it is reasonable to proceed with a planned repeat  delivery. Should the patient decide she strongly desires an attempt at vaginal delivery, a repeat ultrasound attempt can be pursued at 36 weeks. We discussed that the position of the fetal head may preclude optimal imaging at that time.  We discussed delivery timing. [facility] recommends delivery in the setting of a low-lying placenta at 36-37 6/7 weeks gestation. Given the uncertainty about the persistence of the low-lying placenta and the potential for  complications with early term delivery, I reviewed the possibility of delivery a little later between 37-38 weeks via repeat ; however, patient can proceed with delivery at 36-37 6/7 weeks if desired by patient or primary OB.   Should there be any clinical concern for bleeding, please discuss with [facility] as delivery should be undertaken earlier.    Recommendations:   Consider delivery via repeat  at 37-38 weeks. Given the inability to conclusively confirm resolution of the  previous noted low lying placenta, delivery by  is recommended.   If strongly desires attempt at vaginal birth, a  repeat assessment can be performed at 36 weeks; however, it is likely that the fetal head position will obscure imaging.   Please see above for counseling.      Time  I overall spent approximately 30 minutes in face to face time with the patient and her family, greater than 50% of which was in counseling and care coordination.

## 2022-06-21 NOTE — TELEPHONE ENCOUNTER
----- Message from Filomena Johnson sent at 2022  2:12 PM CDT -----  Contact: PATIENT  Aye Caceres  MRN: [id number]  : 2000  PCP: Tiffany Harding  Home Phone      [phone]  Work Phone      Not on file.  Mobile          [phone]  Mobile          [phone]      MESSAGE: Patient would like to see if she can come in earlier than 3:45 tomorrow for her prenatal visit.        Phone: [phone]

## 2022-06-21 NOTE — TELEPHONE ENCOUNTER
Pt was called and informed that she can come a little earlier, but due to the schedule being booked she may not be seen right away. Pt voiced understanding.

## 2022-06-22 ENCOUNTER — TELEPHONE (OUTPATIENT)
Dept: OBSTETRICS AND GYNECOLOGY | Facility: CLINIC | Age: 22
End: 2022-06-22
Payer: MEDICAID

## 2022-06-22 ENCOUNTER — ROUTINE PRENATAL (OUTPATIENT)
Dept: OBSTETRICS AND GYNECOLOGY | Facility: CLINIC | Age: 22
End: 2022-06-22
Payer: MEDICAID

## 2022-06-22 VITALS
WEIGHT: 241.19 LBS | SYSTOLIC BLOOD PRESSURE: 116 MMHG | HEART RATE: 90 BPM | BODY MASS INDEX: 40.14 KG/M2 | DIASTOLIC BLOOD PRESSURE: 74 MMHG

## 2022-06-22 DIAGNOSIS — Z3A.34 34 WEEKS GESTATION OF PREGNANCY: ICD-10-CM

## 2022-06-22 DIAGNOSIS — O09.893 SUPERVISION OF OTHER HIGH RISK PREGNANCIES, THIRD TRIMESTER: Primary | ICD-10-CM

## 2022-06-22 DIAGNOSIS — O44.43 LOW LYING PLACENTA NOS OR WITHOUT HEMORRHAGE, THIRD TRIMESTER: ICD-10-CM

## 2022-06-22 PROCEDURE — 99213 PR OFFICE/OUTPT VISIT, EST, LEVL III, 20-29 MIN: ICD-10-PCS | Mod: TH,S$PBB,, | Performed by: OBSTETRICS & GYNECOLOGY

## 2022-06-22 PROCEDURE — 99999 PR PBB SHADOW E&M-EST. PATIENT-LVL III: CPT | Mod: PBBFAC,,, | Performed by: OBSTETRICS & GYNECOLOGY

## 2022-06-22 PROCEDURE — 99213 OFFICE O/P EST LOW 20 MIN: CPT | Mod: TH,S$PBB,, | Performed by: OBSTETRICS & GYNECOLOGY

## 2022-06-22 PROCEDURE — 99213 OFFICE O/P EST LOW 20 MIN: CPT | Mod: PBBFAC,TH | Performed by: OBSTETRICS & GYNECOLOGY

## 2022-06-22 PROCEDURE — 99999 PR PBB SHADOW E&M-EST. PATIENT-LVL III: ICD-10-PCS | Mod: PBBFAC,,, | Performed by: OBSTETRICS & GYNECOLOGY

## 2022-06-22 NOTE — TELEPHONE ENCOUNTER
RCS scheduled for 7/19/2022 with pre-op, pre-admit and post op appointments scheduled and discussed with patient.  Pt verbalized understanding.  Case request number [id number].  Tiffanie in surgery department notified via Epic message of date and time.

## 2022-06-22 NOTE — PROGRESS NOTES
Patient with no complaints. Denies vaginal bleeding or contractions. Good FM. RTC in 2 weeks.  MFM note reviewed, low lying placenta. Repeat  scheduled.     Vitals signs, FHTs, urine dip, and PE findings documented, reviewed and available in OB flow chart.       I spent a total of 20 minutes on the day of the visit.This includes face to face time and non-face to face time preparing to see the patient (eg, review of tests), Obtaining and/or reviewing separately obtained history, Documenting clinical information in the electronic or other health record, Independently interpreting resultsand communicating results to the patient/family/caregiver, or Care coordination.

## 2022-06-22 NOTE — TELEPHONE ENCOUNTER
----- Message from Jan Clay MD sent at 2022  5:22 PM CDT -----  Please schedule repeat  @ 37-38 WGA per [facility] recs.

## 2022-07-05 ENCOUNTER — ROUTINE PRENATAL (OUTPATIENT)
Dept: OBSTETRICS AND GYNECOLOGY | Facility: CLINIC | Age: 22
End: 2022-07-05
Payer: MEDICAID

## 2022-07-05 VITALS
SYSTOLIC BLOOD PRESSURE: 124 MMHG | DIASTOLIC BLOOD PRESSURE: 72 MMHG | HEART RATE: 100 BPM | WEIGHT: 246.31 LBS | BODY MASS INDEX: 40.99 KG/M2

## 2022-07-05 DIAGNOSIS — Z3A.36 36 WEEKS GESTATION OF PREGNANCY: ICD-10-CM

## 2022-07-05 DIAGNOSIS — Z34.83 ENCOUNTER FOR SUPERVISION OF OTHER NORMAL PREGNANCY IN THIRD TRIMESTER: Primary | ICD-10-CM

## 2022-07-05 PROCEDURE — 99213 PR OFFICE/OUTPT VISIT, EST, LEVL III, 20-29 MIN: ICD-10-PCS | Mod: TH,S$PBB,, | Performed by: OBSTETRICS & GYNECOLOGY

## 2022-07-05 PROCEDURE — 99999 PR PBB SHADOW E&M-EST. PATIENT-LVL III: ICD-10-PCS | Mod: PBBFAC,,, | Performed by: OBSTETRICS & GYNECOLOGY

## 2022-07-05 PROCEDURE — 99213 OFFICE O/P EST LOW 20 MIN: CPT | Mod: TH,S$PBB,, | Performed by: OBSTETRICS & GYNECOLOGY

## 2022-07-05 PROCEDURE — 99213 OFFICE O/P EST LOW 20 MIN: CPT | Mod: PBBFAC,TH | Performed by: OBSTETRICS & GYNECOLOGY

## 2022-07-05 PROCEDURE — 99999 PR PBB SHADOW E&M-EST. PATIENT-LVL III: CPT | Mod: PBBFAC,,, | Performed by: OBSTETRICS & GYNECOLOGY

## 2022-07-05 PROCEDURE — 87081 CULTURE SCREEN ONLY: CPT | Performed by: OBSTETRICS & GYNECOLOGY

## 2022-07-05 NOTE — PROGRESS NOTES
Patient with no complaints. Denies vaginal bleeding or contractions. Good FM. GBS collected today. Labor precautions discused with patient. RTC in 1 week.     Vitals signs, FHTs, urine dip, and PE findings documented, reviewed and available in OB flow chart.       I spent a total of 20 minutes on the day of the visit.This includes face to face time and non-face to face time preparing to see the patient (eg, review of tests), Obtaining and/or reviewing separately obtained history, Documenting clinical information in the electronic or other health record, Independently interpreting resultsand communicating results to the patient/family/caregiver, or Care coordination.

## 2022-07-08 ENCOUNTER — PATIENT MESSAGE (OUTPATIENT)
Dept: OBSTETRICS AND GYNECOLOGY | Facility: CLINIC | Age: 22
End: 2022-07-08
Payer: MEDICAID

## 2022-07-08 LAB — BACTERIA SPEC AEROBE CULT: NORMAL

## 2022-07-10 ENCOUNTER — HOSPITAL ENCOUNTER (OUTPATIENT)
Facility: HOSPITAL | Age: 22
Discharge: HOME | End: 2022-07-10
Attending: OBSTETRICS & GYNECOLOGY | Admitting: OBSTETRICS & GYNECOLOGY
Payer: MEDICAID

## 2022-07-10 ENCOUNTER — TELEPHONE (OUTPATIENT)
Dept: OBSTETRICS AND GYNECOLOGY | Facility: HOSPITAL | Age: 22
End: 2022-07-10
Payer: MEDICAID

## 2022-07-10 VITALS
HEART RATE: 81 BPM | OXYGEN SATURATION: 98 % | RESPIRATION RATE: 18 BRPM | SYSTOLIC BLOOD PRESSURE: 133 MMHG | TEMPERATURE: 97 F | DIASTOLIC BLOOD PRESSURE: 78 MMHG

## 2022-07-10 DIAGNOSIS — O26.899 ABDOMINAL PAIN COMPLICATING PREGNANCY: Primary | ICD-10-CM

## 2022-07-10 DIAGNOSIS — R10.9 ABDOMINAL PAIN COMPLICATING PREGNANCY: Primary | ICD-10-CM

## 2022-07-10 PROCEDURE — G0378 HOSPITAL OBSERVATION PER HR: HCPCS

## 2022-07-10 PROCEDURE — 99234 HOSP IP/OBS SM DT SF/LOW 45: CPT | Mod: 25,,, | Performed by: OBSTETRICS & GYNECOLOGY

## 2022-07-10 PROCEDURE — 59025 PR FETAL 2N-STRESS TEST: ICD-10-PCS | Mod: 26,,, | Performed by: OBSTETRICS & GYNECOLOGY

## 2022-07-10 PROCEDURE — 59025 FETAL NON-STRESS TEST: CPT | Mod: 26,,, | Performed by: OBSTETRICS & GYNECOLOGY

## 2022-07-10 PROCEDURE — 25000003 PHARM REV CODE 250

## 2022-07-10 PROCEDURE — 99234 PR OBSERV/HOSP SAME DATE,LEVL III: ICD-10-PCS | Mod: 25,,, | Performed by: OBSTETRICS & GYNECOLOGY

## 2022-07-10 PROCEDURE — 59025 FETAL NON-STRESS TEST: CPT

## 2022-07-10 RX ORDER — CYCLOBENZAPRINE HCL 5 MG
5 TABLET ORAL ONCE
Status: DISCONTINUED | OUTPATIENT
Start: 2022-07-10 | End: 2022-07-10

## 2022-07-10 RX ORDER — CYCLOBENZAPRINE HCL 5 MG
TABLET ORAL
Status: DISCONTINUED
Start: 2022-07-10 | End: 2022-07-11 | Stop reason: HOSPADM

## 2022-07-10 RX ORDER — ACETAMINOPHEN 500 MG
1000 TABLET ORAL ONCE
Status: COMPLETED | OUTPATIENT
Start: 2022-07-10 | End: 2022-07-10

## 2022-07-10 RX ORDER — ACETAMINOPHEN 500 MG
TABLET ORAL
Status: COMPLETED
Start: 2022-07-10 | End: 2022-07-10

## 2022-07-10 RX ADMIN — ACETAMINOPHEN 1000 MG: 500 TABLET ORAL at 10:07

## 2022-07-10 RX ADMIN — Medication 1000 MG: at 10:07

## 2022-07-11 ENCOUNTER — PATIENT MESSAGE (OUTPATIENT)
Dept: OBSTETRICS AND GYNECOLOGY | Facility: CLINIC | Age: 22
End: 2022-07-11
Payer: MEDICAID

## 2022-07-11 NOTE — DISCHARGE SUMMARY
[facility] - Labor & Delivery  Obstetrics  Discharge Summary      Patient Name: Aye Caceres  MRN: [id number]  Admission Date: 7/10/2022  Hospital Length of Stay: 0 days  Discharge Date and Time:  07/10/2022 10:22 PM  Attending Physician: Kristen Pritchett MD   Discharging Provider: Kristen Pritchett MD   Primary Care Provider: Tiffany Harding NP    HPI: No notes on file    FHT: 140 Cat 1 (reassuring)  TOCO:  absent    * No surgery found *     Hospital Course:   Patient not in labor; stable for discharge.        Final Active Diagnoses:    Diagnosis Date Noted POA    PRINCIPAL PROBLEM:  Abdominal pain complicating pregnancy [O26.899, R10.9] 07/10/2022 Yes      Problems Resolved During this Admission:          Immunizations     None          This patient has no babies on file.  Pending Diagnostic Studies:     None          Discharged Condition: good    Disposition: Home or Self Care    Follow Up:   Follow-up Information     Jan Clay MD Follow up in 3 day(s).    Specialty: Obstetrics and Gynecology  Contact information:  [facility] [id number] [phone]                       Patient Instructions:      Diet Adult Regular     Lifting restrictions     Pelvic Rest     No dressing needed     Notify your health care provider if you experience any of the following:  temperature >100.4     Notify your health care provider if you experience any of the following:  persistent nausea and vomiting or diarrhea     Notify your health care provider if you experience any of the following:  severe uncontrolled pain     Notify your health care provider if you experience any of the following:  redness, tenderness, or signs of infection (pain, swelling, redness, odor or green/yellow discharge around incision site)     Notify your health care provider if you experience any of the following:  difficulty breathing or increased cough     Medications:  Current Discharge Medication List       CONTINUE these medications which have NOT CHANGED    Details   albuterol 2.5 mg /3 mL (0.083 %) Nebu 3 mL, albuterol 5 mg/mL Nebu 0.5 mL Inhale into the lungs.      aspirin 81 MG Chew Take 1 tablet (81 mg total) by mouth once daily.  Qty: 30 tablet, Refills: 10    Associated Diagnoses: Pregnancy examination or test, positive result; History of pre-eclampsia in prior pregnancy, currently pregnant      docusate sodium (DULCOLAX STOOL SOFTENER, DSS, ORAL) Take by mouth.      !! fluticasone propionate (FLONASE) 50 mcg/actuation nasal spray 1 spray (50 mcg total) by Each Nostril route once daily.  Qty: 16 g, Refills: 3    Associated Diagnoses: Nasal congestion      !! fluticasone propionate (FLONASE) 50 mcg/actuation nasal spray 1 spray (50 mcg total) by Each Nostril route once daily.  Qty: 9.9 mL, Refills: 0    Associated Diagnoses: Nasal congestion      loratadine (CLARITIN) 10 mg tablet Take 1 tablet (10 mg total) by mouth once daily.  Qty: 30 tablet, Refills: 5    Associated Diagnoses: Allergic sinusitis      montelukast (SINGULAIR) 10 mg tablet Take 1 tablet (10 mg total) by mouth every evening.  Qty: 30 tablet, Refills: 5    Associated Diagnoses: Mild intermittent asthma without complication      omeprazole (PRILOSEC) 40 MG capsule Take 1 capsule (40 mg total) by mouth every morning. for 30 doses  Qty: 30 capsule, Refills: 5    Associated Diagnoses: Colicky RUQ abdominal pain      ondansetron (ZOFRAN) 4 MG tablet Take 4 mg by mouth every 8 (eight) hours as needed.      ondansetron (ZOFRAN-ODT) 8 MG TbDL Take 1 tablet (8 mg total) by mouth 3 (three) times daily as needed.  Qty: 30 tablet, Refills: 0    Associated Diagnoses: Colicky RUQ abdominal pain      PNV62-FA-om3-dha-epa-fish oil (PRENATAL GUMMY) 400 mcg-35 mg -25 mg-5 mg Chew Take by mouth.      sertraline (ZOLOFT) 100 MG tablet Take 0.5 tablets (50 mg total) by mouth once daily.  Qty: 30 tablet, Refills: 4    Associated Diagnoses: Anxiety disorder affecting  pregnancy, antepartum       !! - Potential duplicate medications found. Please discuss with provider.          Kristen Pritchett MD  Obstetrics  [place] - Labor & Delivery

## 2022-07-11 NOTE — H&P
History and Physical  Obstetrics      SUBJECTIVE:     Aye Caceres is a 22 y.o.  female  at 36w6d weeks gestation with an Estimated Date of Delivery: 22 who is admitted complaining of lower abdominal pain and vaginal pressure. She denies Decreased Fetal Movement, Leakage of Fluid and Vaginal Bleeding. Fetal Movement: normal.    She has been followed prenatally with the following prenatal complications:   Patient Active Problem List   Diagnosis    IVY (generalized anxiety disorder)    Mild episode of recurrent major depressive disorder    Mild intermittent asthma without complication    Mixed hyperlipidemia    Vitamin D insufficiency    Pregnancy related fatigue in third trimester    Viral syndrome    Low-lying placenta         HISTORY:     Prior to Admission medications    Medication Sig Start Date End Date Taking? Authorizing Provider   albuterol 2.5 mg /3 mL (0.083 %) Nebu 3 mL, albuterol 5 mg/mL Nebu 0.5 mL Inhale into the lungs.    Historical Provider   aspirin 81 MG Chew Take 1 tablet (81 mg total) by mouth once daily. 22  Jan Clay MD   docusate sodium (DULCOLAX STOOL SOFTENER, DSS, ORAL) Take by mouth.    Historical Provider   fluticasone propionate (FLONASE) 50 mcg/actuation nasal spray 1 spray (50 mcg total) by Each Nostril route once daily. 21   LOUIE Cast   fluticasone propionate (FLONASE) 50 mcg/actuation nasal spray 1 spray (50 mcg total) by Each Nostril route once daily. 22   Suraj Chan MD   loratadine (CLARITIN) 10 mg tablet Take 1 tablet (10 mg total) by mouth once daily. 20  Aye Giron NP   montelukast (SINGULAIR) 10 mg tablet Take 1 tablet (10 mg total) by mouth every evening. 19   Aye Giron NP   omeprazole (PRILOSEC) 40 MG capsule Take 1 capsule (40 mg total) by mouth every morning. for 30 doses 20  Aye Giron NP   ondansetron (ZOFRAN) 4 MG tablet Take 4 mg by  mouth every 8 (eight) hours as needed. 1/10/22   Historical Provider   ondansetron (ZOFRAN-ODT) 8 MG TbDL Take 1 tablet (8 mg total) by mouth 3 (three) times daily as needed. 20   Aye Giron NP   [id number] oil (PRENATAL GUMMY) 400 mcg-35 mg -25 mg-5 mg Chew Take by mouth.    Historical Provider   sertraline (ZOLOFT) 100 MG tablet Take 0.5 tablets (50 mg total) by mouth once daily. 22   Jan Clay MD     No outpatient medications have been marked as taking for the 7/10/22 encounter (Hospital Encounter).      Past Medical History:   Diagnosis Date    Asthma      Past Surgical History:   Procedure Laterality Date     SECTION       Family History   Problem Relation Age of Onset    Breast cancer Neg Hx     Colon cancer Neg Hx     Ovarian cancer Neg Hx      Social History     Tobacco Use    Smoking status: Never Smoker    Smokeless tobacco: Never Used   Substance Use Topics    Alcohol use: No    Drug use: No     OB History    Para Term  AB Living   2 1 0 1 0 1   SAB IAB Ectopic Multiple Live Births   0 0 0 0 1      # Outcome Date GA Lbr Quinten/2nd Weight Sex Delivery Anes PTL Lv   2 Current            1  21 30w0d  1.247 kg (2 lb 12 oz) F CS-LTranv  Y MCKENZIE      Birth Comments: Pre-e         Allergy:   Review of patient's allergies indicates:  No Known Allergies       ROS:   CONSTITUTIONAL: Negative for fever, chills, diaphoresis, weakness, fatigue, weight loss, weight gain  ENT: negative for sore throat, nasal congestion, nasal discharge, epistaxis, tinnitus, hearing loss  EYES: negative for blurry vision, decreased vision, loss of vision, eye pain, diplopia, photophobia, discharge  SKIN: Negative for rash, itching, hives  RESPIRATORY: negative for cough, hemoptysis, shortness of breath, pleuritic chest pain, wheezing  CARDIOVASCULAR: negative for chest pain, dyspnea on exertion, orthopnea, paroxysmal nocturnal dyspnea, edema,  palpitations  BREAST: negative for breast  tenderness, breast mass, nipple discharge, or skin changes  GASTROINTESTINAL: negative for abdominal pain, flank pain, nausea, vomiting, diarrhea, constipation, black stool, blood in stool  GENITOURINARY: pelvic pain  HEMATOLOGIC/LYMPHATIC: negative for swollen lymph nodes, bleeding, bruising  MUSCULOSKELETAL: negative for back pain, joint pain, joint stiffness, joint swelling, muscle pain, muscle weakness  NEUROLOGICAL: negative for dizzy/vertigo, headache, focal weakness, numbness/tingling, speech problems, loss of consciousness, confusion, memory loss  BEHAVORIAL/PSYCH: negative for anxiety, depression, psychiatric illness  ENDOCRINE: negative for polydipsia/polyuria, palpitations, skin changes, temperature intolerance, unexpected weight changes  ALLERGIC/IMMUNOLOGIC: negative for urticaria, hay fever, angioedema      OBJECTIVE:     Initial Vitals [07/10/22 2100]   BP Pulse Resp Temp SpO2   133/78 80 18 97.5 °F (36.4 °C) 99 %      MAP       --             Physical Exam:  General:  alert,normal appearing gravid female   Skin:  Skin color, texture, turgor normal. No rashes or lesions   HEENT:  conjunctivae/corneas clear. ACE   Lungs:  clear to auscultation bilaterally   Heart:  regular rate and rhythm, S1, S2 normal, no murmur, click, rub or gallop   Breasts:  no discharge, erythema, or tenderness   Abdomen:  soft, non-tender; bowel sounds normal   Uterine Size:  consistent with dates   Presentations:  cephalic   FHT:  140 BPM   Pelvis: External genitalia: normal external genitalia without lesions  Vaginal: without lesions or discharge   Cervix:     Dilation: 1 cm    Effacement: 10 %    Station:  -3    Consistency: medium    Position: posterior       OB Lab History:     Group & Rh   Date Value Ref Range Status   09/09/2020 A POS  Final     Indirect Sole   Date Value Ref Range Status   09/09/2020 NEG  Final     Lab Results   Component Value Date    WBC 7.37 05/12/2022     HGB 9.7 (L) 05/12/2022    HCT 30.9 (L) 05/12/2022    MCV 84 05/12/2022     05/12/2022         Lab Results   Component Value Date    TSH 0.510 03/19/2020     Lab Results   Component Value Date    RUBELLAIGGAN 32.0 09/09/2020     Lab Results   Component Value Date    RPR Non-reactive 09/09/2020     HIV 1/2 Ag/Ab   Date Value Ref Range Status   09/09/2020 Negative Negative Final     Hepatitis B Surface Ag   Date Value Ref Range Status   09/09/2020 Negative Negative Final     Results for orders placed or performed in visit on 05/06/22   OB Glucose Screen   Result Value Ref Range    OB Glucose Screen 122 70 - 140 mg/dL     Results for orders placed or performed in visit on 07/05/22   Strep B Screen, Vaginal / Rectal    Specimen: Vaginal/Rectal   Result Value Ref Range    Strep B Culture No Group B Streptococcus isolated      No results found for this or any previous visit.  Results for orders placed or performed in visit on 02/10/22   C. trachomatis/N. gonorrhoeae by AMP DNA [facility]; Cervix    Specimen: Genital   Result Value Ref Range    Chlamydia, Amplified DNA Not Detected Not Detected    N gonorrhoeae, amplified DNA Not Detected Not Detected       ASSESSMENT/PLAN:     IUP 36w6d gestation  Patient Active Problem List   Diagnosis    IVY (generalized anxiety disorder)    Mild episode of recurrent major depressive disorder    Mild intermittent asthma without complication    Mixed hyperlipidemia    Vitamin D insufficiency    Pregnancy related fatigue in third trimester    Viral syndrome    Low-lying placenta         PLAN:   NST  Tylenol

## 2022-07-11 NOTE — NURSING
Phoned Dr. Aye Caceres,  patient of Kristen Pritchett MD  ,  22 y.o.       OB History    Para Term  AB Living   2 1 0 1 0 1   SAB IAB Ectopic Multiple Live Births   0 0 0 0 1     Estimated Date of Delivery: 22 36w6d     Chief Complaint   Patient presents with    Vaginal Pain       Vitals:    07/10/22 2149   BP: 133/78   Pulse: 81   Resp:    Temp:      Pain:  / 10    Location :  Groin bilateral ;  Pt states radiates to vagina, Worse with movement     Fetal heart tones: Baseline: 135 bpm  Accels present moderate variablility    Uterine Activity:  None noted on strip, soft on palpation    SVE:  /-4        History:          Orders Noted:     Pt presents to L&D c/o /10 pain to bilateral groin radiates to vagina and leg pain and weakness.  Pt denies vag bleeding or leaking of fluids. Pt reports +FM and irregular contractions throughout the day, none closer than an hour apart.  Pt denies HA, blurry vision or vision changes. Mild edema noted to bilateral feet. Pt ambulates with steady gait.      Notified Dr Allred via telephone of pt. Reviewed pt c/o, assessment, vs, strip and SVE  Orders received     2220  Pt medicated per orders, Pt declined flexeril. Pt voices she feels comfortable going home.  Dr Allred notified. Ok for discharge       Pt verbalized understanding of all dc instructions and denied unmet needs at time of discharge. Pt left department ambulatory with steady gait, family at side

## 2022-07-11 NOTE — PROCEDURES
Aye Caceres is a 22 y.o. female patient.    Temp: 97.3 °F (36.3 °C) (07/10/22 2146)  Pulse: 81 (07/10/22 2149)  Resp: 18 (07/10/22 2100)  BP: 133/78 (07/10/22 2149)  SpO2: 98 % (07/10/22 2148)       Obtain Fetal nonstress test (NST)    Date/Time: 7/10/2022 10:26 PM  Performed by: Kristen Pritchett MD  Authorized by: Kristen Pritchett MD     Nonstress Test:     Variability:  6-25 BPM    Decelerations:  None    Accelerations:  15 bpm    Baseline:  140    Contractions:  Not present  Biophysical Profile:     Nonstress Test Interpretation: reactive      Overall Impression:  Reassuring        7/10/2022

## 2022-07-11 NOTE — DISCHARGE INSTRUCTIONS
Return to the labor unit or call ob nurse at hospital if:    1.  Any questions whether the water bag broke or is leaking (sudden gush or suspected leak).   2.  If 35 wks or greater, no need to call about passing the mucous plug if no other signs of labor.  3.  You may have spotting for a day or two from the vaginal exam  4.  Please report heavier vaginal bleeding (requires you to put a pad on or blood is running down your leg)  5. Report if you are having more than 6 contractions in an hour and less than 37 weeks, but at 37-38 wks. and beyond you can time your contractions and notify doctor if they are longer, stronger and closer together  6.  You should call if you are experiencing sharp abdominal pains or severe cramping.    7.  If you are > or = 28 wks, Do fetal kick counts 2 times a day  &  report decreased fetal movement:  You should feel 10 movements in 2 hours or less.  Notify MD or OB nurse as soon as possible if you are not getting the required movements or if  it is taking you longer and longer to get the 10 movements and DO NOT WAIT UNTIL TOMORROW EVEN IF YOU HAVE AN APPT.  8.  Drink plenty of water and empty your bladder often, avoid caffeine & carbonated beverages as much as possible & avoid smoking  9.  Keep your appt. as scheduled    Office phone # [phone]  If after office hours, on the weekend, or unable to reach the nurse at the office, call the Hospital phone # [phone] & ask to speak to the OB nurse

## 2022-07-12 ENCOUNTER — ROUTINE PRENATAL (OUTPATIENT)
Dept: OBSTETRICS AND GYNECOLOGY | Facility: CLINIC | Age: 22
End: 2022-07-12
Payer: MEDICAID

## 2022-07-12 VITALS
SYSTOLIC BLOOD PRESSURE: 122 MMHG | WEIGHT: 246.13 LBS | HEART RATE: 97 BPM | DIASTOLIC BLOOD PRESSURE: 76 MMHG | BODY MASS INDEX: 40.95 KG/M2

## 2022-07-12 DIAGNOSIS — Z3A.37 37 WEEKS GESTATION OF PREGNANCY: ICD-10-CM

## 2022-07-12 DIAGNOSIS — Z34.83 ENCOUNTER FOR SUPERVISION OF OTHER NORMAL PREGNANCY, THIRD TRIMESTER: Primary | ICD-10-CM

## 2022-07-12 DIAGNOSIS — O34.219 HISTORY OF CESAREAN DELIVERY AFFECTING PREGNANCY: ICD-10-CM

## 2022-07-12 PROCEDURE — 99999 PR PBB SHADOW E&M-EST. PATIENT-LVL III: CPT | Mod: PBBFAC,,, | Performed by: OBSTETRICS & GYNECOLOGY

## 2022-07-12 PROCEDURE — 99213 OFFICE O/P EST LOW 20 MIN: CPT | Mod: TH,S$PBB,, | Performed by: OBSTETRICS & GYNECOLOGY

## 2022-07-12 PROCEDURE — 99213 PR OFFICE/OUTPT VISIT, EST, LEVL III, 20-29 MIN: ICD-10-PCS | Mod: TH,S$PBB,, | Performed by: OBSTETRICS & GYNECOLOGY

## 2022-07-12 PROCEDURE — 99999 PR PBB SHADOW E&M-EST. PATIENT-LVL III: ICD-10-PCS | Mod: PBBFAC,,, | Performed by: OBSTETRICS & GYNECOLOGY

## 2022-07-12 PROCEDURE — 99213 OFFICE O/P EST LOW 20 MIN: CPT | Mod: PBBFAC,TH | Performed by: OBSTETRICS & GYNECOLOGY

## 2022-07-12 NOTE — PROGRESS NOTES
Patient with no complaints except vaginal pressure and leg spasms. Denies vaginal bleeding or contractions. Good FM. RTC in 1 week.     Vitals signs, FHTs, urine dip, and PE findings documented, reviewed and available in OB flow chart.       I spent a total of 20 minutes on the day of the visit.This includes face to face time and non-face to face time preparing to see the patient (eg, review of tests), Obtaining and/or reviewing separately obtained history, Documenting clinical information in the electronic or other health record, Independently interpreting resultsand communicating results to the patient/family/caregiver, or Care coordination.

## 2022-07-15 ENCOUNTER — PATIENT MESSAGE (OUTPATIENT)
Dept: OBSTETRICS AND GYNECOLOGY | Facility: CLINIC | Age: 22
End: 2022-07-15
Payer: MEDICAID

## 2022-07-15 ENCOUNTER — TELEPHONE (OUTPATIENT)
Dept: OBSTETRICS AND GYNECOLOGY | Facility: CLINIC | Age: 22
End: 2022-07-15
Payer: MEDICAID

## 2022-07-15 NOTE — TELEPHONE ENCOUNTER
----- Message from Sadia Hurst sent at 7/15/2022  4:33 PM CDT -----  Contact: val Caceres  MRN: [id number]  : 2000  PCP: Tiffany Harding  Home Phone      [phone]  Work Phone      Not on file.  Mobile          [phone]  Mobile          [phone]      MESSAGE: Called  wanting to speak to a nurse.    Phone [phone]

## 2022-07-15 NOTE — TELEPHONE ENCOUNTER
Pt called stating her bp is 128/82 and she's having vaginal lip pain, I informed pt that her bp was fine and asked her what did she mean by vaginal lip pain and she said she has pain in the lip of her vagina and she feels pressure. I asked pt if she was having and contractions and she stated just aleksandrdevorah isaacs, I informed pt that what she's feeling is normal for at the end of pregnancy. I told pt if she begin to feel sever vaginal pressure and jesus every 5 minutes for 2 hours then she needs to go to L&D, pt v/u.

## 2022-07-18 ENCOUNTER — HOSPITAL ENCOUNTER (OUTPATIENT)
Dept: PREADMISSION TESTING | Facility: HOSPITAL | Age: 22
Discharge: HOME | End: 2022-07-18
Attending: OBSTETRICS & GYNECOLOGY
Payer: MEDICAID

## 2022-07-18 ENCOUNTER — ROUTINE PRENATAL (OUTPATIENT)
Dept: OBSTETRICS AND GYNECOLOGY | Facility: CLINIC | Age: 22
End: 2022-07-18
Payer: MEDICAID

## 2022-07-18 ENCOUNTER — ANESTHESIA EVENT (OUTPATIENT)
Dept: SURGERY | Facility: HOSPITAL | Age: 22
End: 2022-07-18
Payer: MEDICAID

## 2022-07-18 VITALS
HEART RATE: 90 BPM | WEIGHT: 247.13 LBS | DIASTOLIC BLOOD PRESSURE: 76 MMHG | BODY MASS INDEX: 41.12 KG/M2 | SYSTOLIC BLOOD PRESSURE: 118 MMHG

## 2022-07-18 DIAGNOSIS — O34.219 HISTORY OF CESAREAN DELIVERY AFFECTING PREGNANCY: ICD-10-CM

## 2022-07-18 DIAGNOSIS — O44.43 LOW LYING PLACENTA NOS OR WITHOUT HEMORRHAGE, THIRD TRIMESTER: ICD-10-CM

## 2022-07-18 DIAGNOSIS — O44.43 LOW-LYING PLACENTA IN THIRD TRIMESTER: ICD-10-CM

## 2022-07-18 DIAGNOSIS — Z3A.38 38 WEEKS GESTATION OF PREGNANCY: ICD-10-CM

## 2022-07-18 DIAGNOSIS — O09.93 SUPERVISION OF HIGH-RISK PREGNANCY, THIRD TRIMESTER: Primary | ICD-10-CM

## 2022-07-18 PROCEDURE — 99213 OFFICE O/P EST LOW 20 MIN: CPT | Mod: TH,S$PBB,, | Performed by: OBSTETRICS & GYNECOLOGY

## 2022-07-18 PROCEDURE — 99999 PR PBB SHADOW E&M-EST. PATIENT-LVL II: ICD-10-PCS | Mod: PBBFAC,,, | Performed by: OBSTETRICS & GYNECOLOGY

## 2022-07-18 PROCEDURE — 99999 PR PBB SHADOW E&M-EST. PATIENT-LVL II: CPT | Mod: PBBFAC,,, | Performed by: OBSTETRICS & GYNECOLOGY

## 2022-07-18 PROCEDURE — 99212 OFFICE O/P EST SF 10 MIN: CPT | Mod: PBBFAC,TH | Performed by: OBSTETRICS & GYNECOLOGY

## 2022-07-18 PROCEDURE — 99213 PR OFFICE/OUTPT VISIT, EST, LEVL III, 20-29 MIN: ICD-10-PCS | Mod: TH,S$PBB,, | Performed by: OBSTETRICS & GYNECOLOGY

## 2022-07-18 RX ORDER — KETOROLAC TROMETHAMINE 30 MG/ML
30 INJECTION, SOLUTION INTRAMUSCULAR; INTRAVENOUS
Status: CANCELLED | OUTPATIENT
Start: 2022-07-18 | End: 2022-07-19

## 2022-07-18 RX ORDER — MISOPROSTOL 100 UG/1
800 TABLET ORAL ONCE AS NEEDED
Status: CANCELLED | OUTPATIENT
Start: 2022-07-18 | End: 2033-12-14

## 2022-07-18 RX ORDER — DIPHENHYDRAMINE HYDROCHLORIDE 50 MG/ML
12.5 INJECTION INTRAMUSCULAR; INTRAVENOUS
Status: CANCELLED | OUTPATIENT
Start: 2022-07-18

## 2022-07-18 RX ORDER — OXYCODONE HYDROCHLORIDE 5 MG/1
5 TABLET ORAL EVERY 4 HOURS PRN
Status: CANCELLED | OUTPATIENT
Start: 2022-07-18

## 2022-07-18 RX ORDER — FAMOTIDINE 10 MG/ML
20 INJECTION INTRAVENOUS
Status: CANCELLED | OUTPATIENT
Start: 2022-07-18

## 2022-07-18 RX ORDER — ACETAMINOPHEN 500 MG
1000 TABLET ORAL
Status: CANCELLED | OUTPATIENT
Start: 2022-07-18

## 2022-07-18 RX ORDER — ACETAMINOPHEN 325 MG/1
650 TABLET ORAL
Status: CANCELLED | OUTPATIENT
Start: 2022-07-18

## 2022-07-18 RX ORDER — SODIUM CHLORIDE, SODIUM LACTATE, POTASSIUM CHLORIDE, CALCIUM CHLORIDE 600; 310; 30; 20 MG/100ML; MG/100ML; MG/100ML; MG/100ML
INJECTION, SOLUTION INTRAVENOUS CONTINUOUS
Status: CANCELLED | OUTPATIENT
Start: 2022-07-18

## 2022-07-18 RX ORDER — SIMETHICONE 80 MG
1 TABLET,CHEWABLE ORAL EVERY 6 HOURS PRN
Status: CANCELLED | OUTPATIENT
Start: 2022-07-18

## 2022-07-18 RX ORDER — OXYCODONE HYDROCHLORIDE 5 MG/1
10 TABLET ORAL EVERY 4 HOURS PRN
Status: CANCELLED | OUTPATIENT
Start: 2022-07-18

## 2022-07-18 RX ORDER — CARBOPROST TROMETHAMINE 250 UG/ML
250 INJECTION, SOLUTION INTRAMUSCULAR
Status: CANCELLED | OUTPATIENT
Start: 2022-07-18

## 2022-07-18 RX ORDER — SODIUM CITRATE AND CITRIC ACID MONOHYDRATE 334; 500 MG/5ML; MG/5ML
30 SOLUTION ORAL
Status: CANCELLED | OUTPATIENT
Start: 2022-07-18

## 2022-07-18 RX ORDER — DIPHENHYDRAMINE HCL 25 MG
25 CAPSULE ORAL EVERY 6 HOURS PRN
Status: CANCELLED | OUTPATIENT
Start: 2022-07-18

## 2022-07-18 RX ORDER — NALBUPHINE HYDROCHLORIDE 10 MG/ML
5 INJECTION, SOLUTION INTRAMUSCULAR; INTRAVENOUS; SUBCUTANEOUS ONCE AS NEEDED
Status: CANCELLED | OUTPATIENT
Start: 2022-07-18 | End: 2033-12-14

## 2022-07-18 RX ORDER — IBUPROFEN 200 MG
800 TABLET ORAL
Status: CANCELLED | OUTPATIENT
Start: 2022-07-19

## 2022-07-18 RX ORDER — ONDANSETRON 4 MG/1
8 TABLET, ORALLY DISINTEGRATING ORAL EVERY 8 HOURS PRN
Status: CANCELLED | OUTPATIENT
Start: 2022-07-18

## 2022-07-18 RX ORDER — PRENATAL WITH FERROUS FUM AND FOLIC ACID 3080; 920; 120; 400; 22; 1.84; 3; 20; 10; 1; 12; 200; 27; 25; 2 [IU]/1; [IU]/1; MG/1; [IU]/1; MG/1; MG/1; MG/1; MG/1; MG/1; MG/1; UG/1; MG/1; MG/1; MG/1; MG/1
1 TABLET ORAL DAILY
Status: CANCELLED | OUTPATIENT
Start: 2022-07-18

## 2022-07-18 RX ORDER — HYDROCORTISONE 25 MG/G
CREAM TOPICAL 3 TIMES DAILY PRN
Status: CANCELLED | OUTPATIENT
Start: 2022-07-18

## 2022-07-18 RX ORDER — ENOXAPARIN SODIUM 100 MG/ML
40 INJECTION SUBCUTANEOUS EVERY 24 HOURS
Status: CANCELLED | OUTPATIENT
Start: 2022-07-18

## 2022-07-18 RX ORDER — OXYTOCIN/RINGER'S LACTATE 30/500 ML
95 PLASTIC BAG, INJECTION (ML) INTRAVENOUS CONTINUOUS
Status: CANCELLED | OUTPATIENT
Start: 2022-07-18

## 2022-07-18 RX ORDER — ONDANSETRON 2 MG/ML
4 INJECTION INTRAMUSCULAR; INTRAVENOUS EVERY 6 HOURS PRN
Status: CANCELLED | OUTPATIENT
Start: 2022-07-18

## 2022-07-18 RX ORDER — PROCHLORPERAZINE EDISYLATE 5 MG/ML
5 INJECTION INTRAMUSCULAR; INTRAVENOUS EVERY 6 HOURS PRN
Status: CANCELLED | OUTPATIENT
Start: 2022-07-18

## 2022-07-18 RX ORDER — METHYLERGONOVINE MALEATE 0.2 MG/ML
200 INJECTION INTRAVENOUS ONCE AS NEEDED
Status: CANCELLED | OUTPATIENT
Start: 2022-07-18 | End: 2033-12-14

## 2022-07-18 RX ORDER — DOCUSATE SODIUM 100 MG/1
200 CAPSULE, LIQUID FILLED ORAL 2 TIMES DAILY
Status: CANCELLED | OUTPATIENT
Start: 2022-07-18

## 2022-07-18 RX ORDER — AMOXICILLIN 250 MG
1 CAPSULE ORAL NIGHTLY PRN
Status: CANCELLED | OUTPATIENT
Start: 2022-07-18

## 2022-07-18 NOTE — DISCHARGE INSTRUCTIONS
Pre Admit Instructions    Day and Date of Procedure:      Call your doctor if you become ill before your surgery  Someone will call you between 1 p.m. And 5 p.m.the workday before the procedure to give you an arrival time       - Before 7 a.m. Enter through Emergency Room       - 7 a.m. To 5 p.m. Enter through main entrance  You must have a responsible  to bring you home    Do NOT eat anything past:  Ok to have clear liquids until:   NO dairy or creamers    Please    Do not wear makeup, jewelry, nail polish or body piercings  Bring containers/solution for contacts, dentures, bridges - these and hearing aids will be removed before your procedure  Do not bring cash, jewelry or valuables the day of your procedure   No smoking at least 24 hours before your procedure  Wear clothing that is comfortable and easy to take off and put on  Do NOT shave for at least 5 days before your surgery      Visitor Policy: subject to change    Due to Covid-19 we have changed our visitor policy for the safety of our patients and staff.  You are allowed 2 people while you are in the Labor and Delivery Department. You will not be allowed to change visitors until the next day.   You are allowed 1 person to stay with you in the Mother Baby Department. You will not be allowed to change this visitor out until the next day.   No young children will be allowed to visit during your stay for their safety.       PRE-OP Hibiclens bath Instructions:    Shower with Hibiclens the Night before and morning of the procedure.   Wash your face with your regular cleanser. DO NOT use Hibiclens on your face.  Thoroughly rinse your body with warm water from the neck down  Apply Hibiclens directly to your skin or on a wet washcloth and wash gently. Do not stand under the water while washing with Hibiclens as you will remove the wash too soon.  Rinse thoroughly with warm water  DO NOT use your regular soap after you have bathed with the Hibiclens  Do not  apply lotion or deodorant to the surgical site  Put on a clean pair of clothes after each bath          Information About Your Procedure    Hand Outs about Rooming in, Skin-to-skin Bonding, Packing tips are in your packet  Before Surgery  Cafeteria Meals: 7am to 10am; 11am to 1:30 pm; Dinner/Supper must may be ordered between 11:00 am and 4 pm from the [facility] After [facility] Menu. Food will be available to  between 5 pm and 6 pm. The kitchen phone extension is [phone].  Your doctor may order and review labs, x-rays, ECG or other tests as a pre-surgery workup. Your doctor will call you if there is need for follow up.   Someone will escort you to the OB department after you check in .  No smoking before surgery-CDC recommends 30 days, but at least 12 hours.  No smoking inside or outside the hospital on hospital grounds.  Call your doctor if you get sick before surgery-cold, flu, fever, urine infection or other.  Wear clothing that is easy to take off and put on.  The hospital will provide you with a gown.  You may bring robe, slippers, nightwear, and toiletries (toothbrush, toothpaste, makeup). You can leave your luggage in your vehicle until after the delivery, so that you do not have to carry it around the hospital.   Brush your teeth and rinse your mouth the morning of surgery, but dont swallow the water.  The nurse will ask questions and check your condition.  The doctor may georges your surgical site.  Compression boots may be put on your calves to reduce the risk of blood clots.  The doctor may order medicine to help you relax before surgery.  You and your baby will go back to the OB Department after your C. Section to recover after surgery.   After Surgery  The nurse will check your temperature, breathing, blood pressure, heart rate, IV site, and surgery site.  A diet will be ordered-most start with ice chips and then advance slowly to other foods.  If you have IV fluids the IV pump will beep to let the  nurse know that she needs to check it.  You may have a urinary catheter and staff may measure your oral intake and urine output. When removed let the nurse know if you have trouble urinating. Uterus cannot contract properly to stop the bleeding if it is displaced by the bladder.   Pain medication may be ordered by the doctor after surgery.  If you have a pain management device tell your caretakers not to press the button because of OVERDOSE RISK. If you are breastfeeding the nurses will help you coordinate breast feeding with pain management.   When the nurse or doctor tells you it is okay to get out of bed, ask for help until you are stable.  The nurse may ask you to turn, cough, and deep breathe to prevent lung problems.  You can use a pillow to hold your incision when you deep breathe or cough to reduce pain.  The nurse will give you discharge instructions--incision care, symptoms to report to your doctor, and your follow-up appointment when you are discharged.  You cannot drive yourself home.    Patient Responsibility to Reduce the Risk of Infections or Complications  Wash Hands and use Waterless Hand Sanitizers  Wash hands frequently with soap and warm water for at least 15 seconds.   Use hand sanitizers (alcohol based) often at home and in public if hands are not visibly soiled  Take Antibiotic Exactly as Prescribed  Do not stop antibiotics too soon; you risk developing infection resistant to antibiotics  Take your antibiotic even if you are feeling better and even if they upset your stomach  Call the doctor if you cant tolerate the antibiotic or you have an allergic reaction  Stay Healthy  Take medicines as prescribed by your doctor  Keep your diabetes under control - diet and medication  Get enough rest, exercise and eat a healthy diet  Keep the Wound Clean and Dry  Wash hands before and after taking care of the incision (cut)  Wash hands when you remove a dressing, before you touch/apply a new  dressing  Shower and clean incision with antibacterial soap and rinse well if the doctor approves  Allow the cut to dry completely before putting on a clean dressing  Do not touch the part of the bandage that will cover the incision  Do not use ointments unless your doctor tells you to-can promote bacterial growth  If ordered, put ointment directly on the dressing-do not touch the end of the tube  Do not scrub, remove scabs, or leave a damp dressing on the incision  Do not use peroxide or alcohol to clean the incision unless the doctor tells you to   Do not let children, pets or anyone else contaminate the incision  Stop Smoking To Prevent Infection  Stop smoking-Centers for Disease Control recommends 30 days before surgery  Smokers get more infections after surgery-studies have shown 6 times the risk  Smokers have more scarring and heal slower-open wounds get infected easier  Prevent Respiratory complications  Stop smoking  Turn, cough, and deep breathe even if you have some pain when you do so.  Splint your incision with a pillow when you cough/deep breath, to help control pain.  Do not lie in one position for long periods of time.   Prevent Blood Clots  When you wake move your legs, flex your feet, rotate your ankles, wiggle your toes  Get up when the doctor says its ok.  Dangle your feet from the side of the bed  Report symptoms-leg pain, redness/swelling, warm to touch; fever; shortness of breath, chest pain, severe upper back pain.

## 2022-07-18 NOTE — PROGRESS NOTES
Patient with no complaints. Denies vaginal bleeding or contractions. Good FM. Repeat  tomorrow secondary to low lying placenta and MFM recs.    Vitals signs, FHTs, urine dip, and PE findings documented, reviewed and available in OB flow chart.       I spent a total of 20 minutes on the day of the visit.This includes face to face time and non-face to face time preparing to see the patient (eg, review of tests), Obtaining and/or reviewing separately obtained history, Documenting clinical information in the electronic or other health record, Independently interpreting resultsand communicating results to the patient/family/caregiver, or Care coordination.

## 2022-07-19 ENCOUNTER — HOSPITAL ENCOUNTER (INPATIENT)
Facility: HOSPITAL | Age: 22
LOS: 1 days | Discharge: HOME | End: 2022-07-20
Attending: OBSTETRICS & GYNECOLOGY | Admitting: OBSTETRICS & GYNECOLOGY
Payer: MEDICAID

## 2022-07-19 ENCOUNTER — ANESTHESIA (OUTPATIENT)
Dept: SURGERY | Facility: HOSPITAL | Age: 22
End: 2022-07-19
Payer: MEDICAID

## 2022-07-19 DIAGNOSIS — O44.43 LOW-LYING PLACENTA IN THIRD TRIMESTER: ICD-10-CM

## 2022-07-19 DIAGNOSIS — O34.219 HISTORY OF CESAREAN DELIVERY AFFECTING PREGNANCY: ICD-10-CM

## 2022-07-19 DIAGNOSIS — O44.43 LOW LYING PLACENTA NOS OR WITHOUT HEMORRHAGE, THIRD TRIMESTER: ICD-10-CM

## 2022-07-19 DIAGNOSIS — O09.93 SUPERVISION OF HIGH-RISK PREGNANCY, THIRD TRIMESTER: ICD-10-CM

## 2022-07-19 DIAGNOSIS — Z98.891 S/P REPEAT LOW TRANSVERSE C-SECTION: Primary | ICD-10-CM

## 2022-07-19 LAB
CTP QC/QA: YES
SARS-COV-2 AG RESP QL IA.RAPID: NEGATIVE

## 2022-07-19 PROCEDURE — 11000001 HC ACUTE MED/SURG PRIVATE ROOM

## 2022-07-19 PROCEDURE — 63600175 PHARM REV CODE 636 W HCPCS: Performed by: OBSTETRICS & GYNECOLOGY

## 2022-07-19 PROCEDURE — 51702 INSERT TEMP BLADDER CATH: CPT

## 2022-07-19 PROCEDURE — 63600175 PHARM REV CODE 636 W HCPCS: Performed by: NURSE ANESTHETIST, CERTIFIED REGISTERED

## 2022-07-19 PROCEDURE — 36000709 HC OR TIME LEV III EA ADD 15 MIN: Performed by: OBSTETRICS & GYNECOLOGY

## 2022-07-19 PROCEDURE — 01961 ANES CESAREAN DELIVERY ONLY: CPT | Mod: QZ | Performed by: NURSE ANESTHETIST, CERTIFIED REGISTERED

## 2022-07-19 PROCEDURE — 59514 PR CESAREAN DELIVERY ONLY: ICD-10-PCS | Mod: AT,,, | Performed by: OBSTETRICS & GYNECOLOGY

## 2022-07-19 PROCEDURE — 63600175 PHARM REV CODE 636 W HCPCS

## 2022-07-19 PROCEDURE — 59514AH PRA REAN DELIVERY ONLY: Mod: QZ | Performed by: NURSE ANESTHETIST, CERTIFIED REGISTERED

## 2022-07-19 PROCEDURE — 36000708 HC OR TIME LEV III 1ST 15 MIN: Performed by: OBSTETRICS & GYNECOLOGY

## 2022-07-19 PROCEDURE — 37000008 HC ANESTHESIA 1ST 15 MINUTES: Performed by: OBSTETRICS & GYNECOLOGY

## 2022-07-19 PROCEDURE — 25000003 PHARM REV CODE 250

## 2022-07-19 PROCEDURE — 37000009 HC ANESTHESIA EA ADD 15 MINS: Performed by: OBSTETRICS & GYNECOLOGY

## 2022-07-19 PROCEDURE — 25000003 PHARM REV CODE 250: Performed by: OBSTETRICS & GYNECOLOGY

## 2022-07-19 PROCEDURE — 59514 CESAREAN DELIVERY ONLY: CPT | Mod: AT,,, | Performed by: OBSTETRICS & GYNECOLOGY

## 2022-07-19 RX ORDER — HYDROCORTISONE 25 MG/G
CREAM TOPICAL 3 TIMES DAILY PRN
Status: DISCONTINUED | OUTPATIENT
Start: 2022-07-19 | End: 2022-07-20 | Stop reason: HOSPADM

## 2022-07-19 RX ORDER — ACETAMINOPHEN 500 MG
1000 TABLET ORAL
Status: COMPLETED | OUTPATIENT
Start: 2022-07-19 | End: 2022-07-19

## 2022-07-19 RX ORDER — DOCUSATE SODIUM 100 MG/1
CAPSULE, LIQUID FILLED ORAL
Status: COMPLETED
Start: 2022-07-19 | End: 2022-07-19

## 2022-07-19 RX ORDER — SODIUM CITRATE AND CITRIC ACID MONOHYDRATE 334; 500 MG/5ML; MG/5ML
30 SOLUTION ORAL
Status: COMPLETED | OUTPATIENT
Start: 2022-07-19 | End: 2022-07-19

## 2022-07-19 RX ORDER — SODIUM CHLORIDE, SODIUM LACTATE, POTASSIUM CHLORIDE, CALCIUM CHLORIDE 600; 310; 30; 20 MG/100ML; MG/100ML; MG/100ML; MG/100ML
INJECTION, SOLUTION INTRAVENOUS CONTINUOUS
Status: DISCONTINUED | OUTPATIENT
Start: 2022-07-19 | End: 2022-07-20 | Stop reason: HOSPADM

## 2022-07-19 RX ORDER — SODIUM CITRATE AND CITRIC ACID MONOHYDRATE 334; 500 MG/5ML; MG/5ML
SOLUTION ORAL
Status: COMPLETED
Start: 2022-07-19 | End: 2022-07-19

## 2022-07-19 RX ORDER — OXYTOCIN/RINGER'S LACTATE 30/500 ML
95 PLASTIC BAG, INJECTION (ML) INTRAVENOUS ONCE
Status: DISCONTINUED | OUTPATIENT
Start: 2022-07-19 | End: 2022-07-20

## 2022-07-19 RX ORDER — ONDANSETRON 2 MG/ML
4 INJECTION INTRAMUSCULAR; INTRAVENOUS EVERY 6 HOURS PRN
Status: DISCONTINUED | OUTPATIENT
Start: 2022-07-19 | End: 2022-07-20 | Stop reason: HOSPADM

## 2022-07-19 RX ORDER — MISOPROSTOL 200 UG/1
800 TABLET ORAL ONCE AS NEEDED
Status: DISCONTINUED | OUTPATIENT
Start: 2022-07-19 | End: 2022-07-20 | Stop reason: HOSPADM

## 2022-07-19 RX ORDER — FENTANYL CITRATE 50 UG/ML
INJECTION, SOLUTION INTRAMUSCULAR; INTRAVENOUS
Status: DISCONTINUED | OUTPATIENT
Start: 2022-07-19 | End: 2022-07-19

## 2022-07-19 RX ORDER — ACETAMINOPHEN 500 MG
TABLET ORAL
Status: COMPLETED
Start: 2022-07-19 | End: 2022-07-19

## 2022-07-19 RX ORDER — METHYLERGONOVINE MALEATE 0.2 MG/ML
200 INJECTION INTRAVENOUS ONCE AS NEEDED
Status: DISCONTINUED | OUTPATIENT
Start: 2022-07-19 | End: 2022-07-20 | Stop reason: HOSPADM

## 2022-07-19 RX ORDER — CEFAZOLIN SODIUM 2 G/50ML
2 SOLUTION INTRAVENOUS
Status: DISCONTINUED | OUTPATIENT
Start: 2022-07-19 | End: 2022-07-20

## 2022-07-19 RX ORDER — ACETAMINOPHEN 325 MG/1
650 TABLET ORAL
Status: DISCONTINUED | OUTPATIENT
Start: 2022-07-19 | End: 2022-07-20 | Stop reason: HOSPADM

## 2022-07-19 RX ORDER — OXYCODONE HYDROCHLORIDE 5 MG/1
10 TABLET ORAL EVERY 4 HOURS PRN
Status: DISCONTINUED | OUTPATIENT
Start: 2022-07-19 | End: 2022-07-20 | Stop reason: HOSPADM

## 2022-07-19 RX ORDER — PHENYLEPHRINE HYDROCHLORIDE 10 MG/ML
INJECTION INTRAVENOUS
Status: DISCONTINUED | OUTPATIENT
Start: 2022-07-19 | End: 2022-07-19

## 2022-07-19 RX ORDER — OXYCODONE HYDROCHLORIDE 5 MG/1
TABLET ORAL
Status: COMPLETED
Start: 2022-07-19 | End: 2022-07-19

## 2022-07-19 RX ORDER — TRANEXAMIC ACID 100 MG/ML
1000 INJECTION, SOLUTION INTRAVENOUS ONCE AS NEEDED
Status: DISCONTINUED | OUTPATIENT
Start: 2022-07-19 | End: 2022-07-20 | Stop reason: HOSPADM

## 2022-07-19 RX ORDER — DIPHENHYDRAMINE HCL 25 MG
25 CAPSULE ORAL EVERY 6 HOURS PRN
Status: DISCONTINUED | OUTPATIENT
Start: 2022-07-19 | End: 2022-07-20 | Stop reason: HOSPADM

## 2022-07-19 RX ORDER — SODIUM CHLORIDE, SODIUM LACTATE, POTASSIUM CHLORIDE, CALCIUM CHLORIDE 600; 310; 30; 20 MG/100ML; MG/100ML; MG/100ML; MG/100ML
INJECTION, SOLUTION INTRAVENOUS CONTINUOUS
Status: DISCONTINUED | OUTPATIENT
Start: 2022-07-19 | End: 2022-07-20

## 2022-07-19 RX ORDER — OXYCODONE HYDROCHLORIDE 5 MG/1
5 TABLET ORAL EVERY 4 HOURS PRN
Status: DISCONTINUED | OUTPATIENT
Start: 2022-07-19 | End: 2022-07-20 | Stop reason: HOSPADM

## 2022-07-19 RX ORDER — OXYTOCIN/RINGER'S LACTATE 30/500 ML
PLASTIC BAG, INJECTION (ML) INTRAVENOUS
Status: DISCONTINUED | OUTPATIENT
Start: 2022-07-19 | End: 2022-07-19

## 2022-07-19 RX ORDER — PRENATAL WITH FERROUS FUM AND FOLIC ACID 3080; 920; 120; 400; 22; 1.84; 3; 20; 10; 1; 12; 200; 27; 25; 2 [IU]/1; [IU]/1; MG/1; [IU]/1; MG/1; MG/1; MG/1; MG/1; MG/1; MG/1; UG/1; MG/1; MG/1; MG/1; MG/1
1 TABLET ORAL DAILY
Status: DISCONTINUED | OUTPATIENT
Start: 2022-07-19 | End: 2022-07-20 | Stop reason: HOSPADM

## 2022-07-19 RX ORDER — PROCHLORPERAZINE EDISYLATE 5 MG/ML
5 INJECTION INTRAMUSCULAR; INTRAVENOUS EVERY 6 HOURS PRN
Status: DISCONTINUED | OUTPATIENT
Start: 2022-07-19 | End: 2022-07-20 | Stop reason: HOSPADM

## 2022-07-19 RX ORDER — KETOROLAC TROMETHAMINE 30 MG/ML
30 INJECTION, SOLUTION INTRAMUSCULAR; INTRAVENOUS
Status: COMPLETED | OUTPATIENT
Start: 2022-07-19 | End: 2022-07-20

## 2022-07-19 RX ORDER — IBUPROFEN 800 MG/1
800 TABLET ORAL
Status: DISCONTINUED | OUTPATIENT
Start: 2022-07-20 | End: 2022-07-20 | Stop reason: HOSPADM

## 2022-07-19 RX ORDER — NALBUPHINE HYDROCHLORIDE 10 MG/ML
5 INJECTION, SOLUTION INTRAMUSCULAR; INTRAVENOUS; SUBCUTANEOUS ONCE AS NEEDED
Status: DISCONTINUED | OUTPATIENT
Start: 2022-07-19 | End: 2022-07-20 | Stop reason: HOSPADM

## 2022-07-19 RX ORDER — CARBOPROST TROMETHAMINE 250 UG/ML
250 INJECTION, SOLUTION INTRAMUSCULAR
Status: DISCONTINUED | OUTPATIENT
Start: 2022-07-19 | End: 2022-07-20 | Stop reason: HOSPADM

## 2022-07-19 RX ORDER — DIPHENHYDRAMINE HYDROCHLORIDE 50 MG/ML
12.5 INJECTION INTRAMUSCULAR; INTRAVENOUS
Status: DISCONTINUED | OUTPATIENT
Start: 2022-07-19 | End: 2022-07-20 | Stop reason: HOSPADM

## 2022-07-19 RX ORDER — CEFAZOLIN SODIUM 1 G/3ML
INJECTION, POWDER, FOR SOLUTION INTRAMUSCULAR; INTRAVENOUS
Status: DISCONTINUED | OUTPATIENT
Start: 2022-07-19 | End: 2022-07-19

## 2022-07-19 RX ORDER — SIMETHICONE 80 MG
1 TABLET,CHEWABLE ORAL EVERY 6 HOURS PRN
Status: DISCONTINUED | OUTPATIENT
Start: 2022-07-19 | End: 2022-07-20 | Stop reason: HOSPADM

## 2022-07-19 RX ORDER — ONDANSETRON 8 MG/1
8 TABLET, ORALLY DISINTEGRATING ORAL EVERY 8 HOURS PRN
Status: DISCONTINUED | OUTPATIENT
Start: 2022-07-19 | End: 2022-07-20 | Stop reason: HOSPADM

## 2022-07-19 RX ORDER — SODIUM CHLORIDE, SODIUM LACTATE, POTASSIUM CHLORIDE, CALCIUM CHLORIDE 600; 310; 30; 20 MG/100ML; MG/100ML; MG/100ML; MG/100ML
INJECTION, SOLUTION INTRAVENOUS CONTINUOUS PRN
Status: DISCONTINUED | OUTPATIENT
Start: 2022-07-19 | End: 2022-07-19

## 2022-07-19 RX ORDER — FAMOTIDINE 10 MG/ML
20 INJECTION INTRAVENOUS
Status: COMPLETED | OUTPATIENT
Start: 2022-07-19 | End: 2022-07-19

## 2022-07-19 RX ORDER — DOCUSATE SODIUM 100 MG/1
200 CAPSULE, LIQUID FILLED ORAL 2 TIMES DAILY
Status: DISCONTINUED | OUTPATIENT
Start: 2022-07-19 | End: 2022-07-20 | Stop reason: HOSPADM

## 2022-07-19 RX ORDER — ACETAMINOPHEN 325 MG/1
TABLET ORAL
Status: DISPENSED
Start: 2022-07-19 | End: 2022-07-19

## 2022-07-19 RX ORDER — KETOROLAC TROMETHAMINE 30 MG/ML
INJECTION, SOLUTION INTRAMUSCULAR; INTRAVENOUS
Status: COMPLETED
Start: 2022-07-19 | End: 2022-07-19

## 2022-07-19 RX ORDER — ENOXAPARIN SODIUM 100 MG/ML
40 INJECTION SUBCUTANEOUS EVERY 24 HOURS
Status: DISCONTINUED | OUTPATIENT
Start: 2022-07-19 | End: 2022-07-19

## 2022-07-19 RX ORDER — SERTRALINE HYDROCHLORIDE 50 MG/1
50 TABLET, FILM COATED ORAL NIGHTLY
Status: DISCONTINUED | OUTPATIENT
Start: 2022-07-19 | End: 2022-07-20 | Stop reason: HOSPADM

## 2022-07-19 RX ORDER — FAMOTIDINE 10 MG/ML
INJECTION INTRAVENOUS
Status: COMPLETED
Start: 2022-07-19 | End: 2022-07-19

## 2022-07-19 RX ORDER — ONDANSETRON HYDROCHLORIDE 2 MG/ML
INJECTION, SOLUTION INTRAMUSCULAR; INTRAVENOUS
Status: DISCONTINUED | OUTPATIENT
Start: 2022-07-19 | End: 2022-07-19

## 2022-07-19 RX ORDER — ENOXAPARIN SODIUM 100 MG/ML
40 INJECTION SUBCUTANEOUS EVERY 12 HOURS
Status: DISCONTINUED | OUTPATIENT
Start: 2022-07-19 | End: 2022-07-20 | Stop reason: HOSPADM

## 2022-07-19 RX ORDER — AMOXICILLIN 250 MG
1 CAPSULE ORAL NIGHTLY PRN
Status: DISCONTINUED | OUTPATIENT
Start: 2022-07-19 | End: 2022-07-20 | Stop reason: HOSPADM

## 2022-07-19 RX ADMIN — FAMOTIDINE 20 MG: 10 INJECTION INTRAVENOUS at 06:07

## 2022-07-19 RX ADMIN — ACETAMINOPHEN 650 MG: 325 TABLET ORAL at 08:07

## 2022-07-19 RX ADMIN — OXYCODONE HYDROCHLORIDE 10 MG: 5 TABLET ORAL at 12:07

## 2022-07-19 RX ADMIN — ACETAMINOPHEN 650 MG: 325 TABLET ORAL at 03:07

## 2022-07-19 RX ADMIN — PHENYLEPHRINE HYDROCHLORIDE 200 MCG: 10 INJECTION INTRAVENOUS at 07:07

## 2022-07-19 RX ADMIN — KETOROLAC TROMETHAMINE 30 MG: 30 INJECTION, SOLUTION INTRAMUSCULAR; INTRAVENOUS at 09:07

## 2022-07-19 RX ADMIN — Medication 1000 MG: at 06:07

## 2022-07-19 RX ADMIN — KETOROLAC TROMETHAMINE 30 MG: 30 INJECTION, SOLUTION INTRAMUSCULAR at 03:07

## 2022-07-19 RX ADMIN — SODIUM CITRATE AND CITRIC ACID MONOHYDRATE 30 ML: 334; 500 SOLUTION ORAL at 06:07

## 2022-07-19 RX ADMIN — KETOROLAC TROMETHAMINE 30 MG: 30 INJECTION, SOLUTION INTRAMUSCULAR at 09:07

## 2022-07-19 RX ADMIN — SODIUM CITRATE AND CITRIC ACID MONOHYDRATE 30 ML: 500; 334 SOLUTION ORAL at 06:07

## 2022-07-19 RX ADMIN — SODIUM CHLORIDE, SODIUM LACTATE, POTASSIUM CHLORIDE, AND CALCIUM CHLORIDE: .6; .31; .03; .02 INJECTION, SOLUTION INTRAVENOUS at 05:07

## 2022-07-19 RX ADMIN — OXYCODONE HYDROCHLORIDE 5 MG: 5 TABLET ORAL at 05:07

## 2022-07-19 RX ADMIN — SERTRALINE HYDROCHLORIDE 50 MG: 50 TABLET ORAL at 08:07

## 2022-07-19 RX ADMIN — ONDANSETRON 8 MG: 2 INJECTION, SOLUTION INTRAMUSCULAR; INTRAVENOUS at 08:07

## 2022-07-19 RX ADMIN — ENOXAPARIN SODIUM 40 MG: 100 INJECTION SUBCUTANEOUS at 10:07

## 2022-07-19 RX ADMIN — CEFAZOLIN 2 G: 330 INJECTION, POWDER, FOR SOLUTION INTRAMUSCULAR; INTRAVENOUS at 07:07

## 2022-07-19 RX ADMIN — DOCUSATE SODIUM 200 MG: 100 CAPSULE, LIQUID FILLED ORAL at 08:07

## 2022-07-19 RX ADMIN — SODIUM CHLORIDE, SODIUM LACTATE, POTASSIUM CHLORIDE, AND CALCIUM CHLORIDE: .6; .31; .03; .02 INJECTION, SOLUTION INTRAVENOUS at 07:07

## 2022-07-19 RX ADMIN — PHENYLEPHRINE HYDROCHLORIDE 100 MCG: 10 INJECTION INTRAVENOUS at 07:07

## 2022-07-19 RX ADMIN — DOCUSATE SODIUM 200 MG: 100 CAPSULE, LIQUID FILLED ORAL at 09:07

## 2022-07-19 RX ADMIN — ACETAMINOPHEN 1000 MG: 500 TABLET ORAL at 06:07

## 2022-07-19 RX ADMIN — FENTANYL CITRATE 20 MCG: 50 INJECTION, SOLUTION INTRAMUSCULAR; INTRAVENOUS at 07:07

## 2022-07-19 RX ADMIN — Medication 30 UNITS: at 07:07

## 2022-07-19 RX ADMIN — PHENYLEPHRINE HYDROCHLORIDE 200 MCG: 10 INJECTION INTRAVENOUS at 08:07

## 2022-07-19 RX ADMIN — OXYCODONE HYDROCHLORIDE 5 MG: 5 TABLET ORAL at 09:07

## 2022-07-19 NOTE — ASSESSMENT & PLAN NOTE
Repeat  today per [facility] recs.   Patient cleared for Anesthesia: Spinal    Anesthesia/Surgery risks, benefits, and alternative options discussed and understood by patient/fa

## 2022-07-19 NOTE — PLAN OF CARE
Assumed care of this patient at 1415, report from MEGHAN Vera RN. Oriented  to room, white board filled out. Family present.Catheter draining dark mulu urine noted. PO fluids encouraged. Eating Regular diet well without nausea or vomiting. Throughout shift patient has increased her PO intake and when Catheter Removed at 1645 urine was much clear and pale yellow.Adequate amount noted. Gordon care done. SCD's removed as per patient's request. At about 1715 pm patient wanted to walk in room assisted patient up and gordon care and pads changed however patient stated she wasn't ready to attempt to void. She walked in room and sat in rocker. Instructed patient to call for needs and use assistance when ambulating and to call for assistance when she does go to void and about measure urine. Phone numbers on white board in room.Bed in lowest position.Pain meds managing pain well. Got home zoloft reordered for every HS as taken at home.Assessed first feeding immediately after birth. Education provided on latch, positioning,milk transfer and importance of baby led feeding on cue (8 or more times daily) and use of hand expression. LATCH score complete. Reviewed the risk associated with use of pacifiers and reasons to avoid artificial nipples. Encouraged mother to use Breastfeeding Guide to track feedings and output. Questions/ Concerns answered. Mother verbalizes understanding.

## 2022-07-19 NOTE — H&P
[facility] - Labor & Delivery  Obstetrics  History & Physical    Patient Name: Aye Caceres  MRN: [id number]  Admission Date: 2022  Primary Care Provider: Tiffany Harding NP    Subjective:     Principal Problem:Low-lying placenta    History of Present Illness:  Pt is a  @ 38 1/7 WGA who presents for scheduled repeat  secondary to low lying placenta and h/o  with recs from [facility] to deliver between 37-38 WGA.  No complaints.       Obstetric HPI:  Patient reports no contractions, active fetal movement, No vaginal bleeding , No loss of fluid     This pregnancy has been complicated by low lying placenta, h/o     OB History    Para Term  AB Living   2 1 0 1 0 1   SAB IAB Ectopic Multiple Live Births   0 0 0 0 1      # Outcome Date GA Lbr Quinten/2nd Weight Sex Delivery Anes PTL Lv   2 Current            1  21 30w0d  1.247 kg (2 lb 12 oz) F CS-LTranv  Y MCKENZIE      Birth Comments: Pre-e     Past Medical History:   Diagnosis Date    Asthma      Past Surgical History:   Procedure Laterality Date     SECTION         PTA Medications   Medication Sig    albuterol 2.5 mg /3 mL (0.083 %) Nebu 3 mL, albuterol 5 mg/mL Nebu 0.5 mL Inhale into the lungs.    aspirin 81 MG Chew Take 1 tablet (81 mg total) by mouth once daily. (Patient not taking: No sig reported)    docusate sodium (DULCOLAX STOOL SOFTENER, DSS, ORAL) Take by mouth.    fluticasone propionate (FLONASE) 50 mcg/actuation nasal spray 1 spray (50 mcg total) by Each Nostril route once daily.    fluticasone propionate (FLONASE) 50 mcg/actuation nasal spray 1 spray (50 mcg total) by Each Nostril route once daily.    loratadine (CLARITIN) 10 mg tablet Take 1 tablet (10 mg total) by mouth once daily.    montelukast (SINGULAIR) 10 mg tablet Take 1 tablet (10 mg total) by mouth every evening.    omeprazole (PRILOSEC) 40 MG capsule Take 1 capsule (40 mg total) by mouth every morning. for 30 doses     ondansetron (ZOFRAN) 4 MG tablet Take 4 mg by mouth every 8 (eight) hours as needed.    ondansetron (ZOFRAN-ODT) 8 MG TbDL Take 1 tablet (8 mg total) by mouth 3 (three) times daily as needed.    PNV62-FA-om3-dha-epa-fish oil (PRENATAL GUMMY) 400 mcg-35 mg -25 mg-5 mg Chew Take by mouth.    sertraline (ZOLOFT) 100 MG tablet Take 0.5 tablets (50 mg total) by mouth once daily.       Review of patient's allergies indicates:  No Known Allergies     Family History    None       Tobacco Use    Smoking status: Never Smoker    Smokeless tobacco: Never Used   Substance and Sexual Activity    Alcohol use: No    Drug use: No    Sexual activity: Yes     Partners: Male     Birth control/protection: None     Comment: single      Review of Systems   Constitutional:  Negative for activity change, appetite change, chills, diaphoresis, fatigue and fever.   Eyes:  Negative for visual disturbance.   Respiratory:  Negative for cough, shortness of breath and wheezing.    Cardiovascular:  Negative for chest pain and palpitations.   Gastrointestinal:  Negative for abdominal pain, constipation, diarrhea, nausea and vomiting.   Genitourinary:  Negative for dysuria, genital sores, pelvic pain, urgency, vaginal bleeding, vaginal discharge, vaginal pain and vaginal odor.   Musculoskeletal:  Negative for back pain, joint swelling and myalgias.   Integumentary:  Negative for rash.   Neurological:  Negative for seizures, syncope, numbness and headaches.   Hematological:  Negative for adenopathy. Does not bruise/bleed easily.   Psychiatric/Behavioral:  Negative for depression. The patient is not nervous/anxious.     Objective:     Vital Signs (Most Recent):  Temp: 97.7 °F (36.5 °C) (07/19/22 0530)  Pulse: 76 (07/19/22 0616)  Resp: 20 (07/19/22 0530)  BP: 138/83 (07/19/22 0616)  SpO2: 97 % (07/19/22 0530) Vital Signs (24h Range):  Temp:  [97.7 °F (36.5 °C)] 97.7 °F (36.5 °C)  Pulse:  [76-90] 76  Resp:  [20] 20  SpO2:  [97 %] 97 %  BP:  (118-138)/(75-88) 138/83     Weight: 112 kg (247 lb)  Body mass index is 41.1 kg/m².    FHT: 135 Cat 1 (reassuring)  TOCO:  Quiet    Physical Exam:   Constitutional: She is oriented to person, place, and time. She appears well-developed and well-nourished. No distress.    HENT:   Head: Normocephalic and atraumatic.    Eyes: Conjunctivae and EOM are normal.      Pulmonary/Chest: Effort normal. No respiratory distress.                  Musculoskeletal: Normal range of motion.       Neurological: She is alert and oriented to person, place, and time.    Skin: Skin is warm and dry.    Psychiatric: She has a normal mood and affect. Her behavior is normal. Judgment and thought content normal.       Significant Labs:  Lab Results   Component Value Date    GROUPTRH A POS 07/18/2022    HEPBSAG Negative 09/09/2020    STREPBCULT No Group B Streptococcus isolated 07/05/2022       I have personallly reviewed all pertinent lab results from the last 24 hours.  Recent Lab Results         07/19/22  0545   07/18/22  1330   07/18/22  1329        Baso #     0.03       Basophil %     0.3       Differential Method     Automated       Eos #     0.0       Eosinophil %     0.3       Gran # (ANC)     8.6       Gran %     72.8       Group & Rh     A POS       Hematocrit     32.4       Hemoglobin     10.0       Immature Grans (Abs)     0.04  Comment: Mild elevation in immature granulocytes is non specific and   can be seen in a variety of conditions including stress response,   acute inflammation, trauma and pregnancy. Correlation with other   laboratory and clinical findings is essential.         Immature Granulocytes     0.3       INDIRECT KAYLYN     NEG       Lymph #     2.3       Lymph %     19.7       MCH     24.9       MCHC     30.9       MCV     81       Mono #     0.8       Mono %     6.6       MPV     9.9       nRBC     0       Platelets     279        Acceptable Yes           RBC     4.01       RDW     14.7       RPR    Non-reactive         SARS Coronavirus 2 Antigen Negative           WBC     11.81             Assessment/Plan:     22 y.o. female  at 38w1d for:    * Low-lying placenta  Repeat  today per [facility] recs.   Patient cleared for Anesthesia: Spinal    Anesthesia/Surgery risks, benefits, and alternative options discussed and understood by patient/fa      History of  delivery, currently pregnant  Plan for repeat         Jan Clay MD  Obstetrics  [facility] - Labor & Delivery

## 2022-07-19 NOTE — LACTATION NOTE
07/19/22 0830   Maternal Assessment   Breast Size Issue none   Breast Shape Bilateral:;round   Breast Density Bilateral:;soft   Areola Bilateral:;elastic   Nipples Bilateral:;everted   Maternal Infant Feeding   Maternal Emotional State relaxed;assist needed   Infant Positioning cradle;cross-cradle;clutch/football   Signs of Milk Transfer audible swallow;infant jaw motion present;suck/swallow ratio   Nipple Shape After Feeding, Left round, everted   Nipple Shape After Feeding, Right round, everted   Equipment Type   Breast Pump Type double electric, personal  (mother has ordered, awaiting delivery)     Upon entering RN assisting mother with latching infant to L breast in football hold. Infant able to latch easily with strong rhythmic pulls, flanged lips and audible swallows. Mother reports feeling tug, no pain with latch. Mother has ordered breast pump, awaiting delivery. Mother reports increase in breast size during pregnancy. Pumped with last baby while in NICU. Basics reviewed during bf.     Basic Breastfeeding Instructions     The more you nurse the baby the more milk you will make.   Avoid bottles and pacifiers for the first 4 weeks.   Feed your baby only breastmik for the first 6 months.   Feed your baby at the earliest sign of hunger or comfort:  o Sucking on fingers or hands  o Bringing hands toward his mouth  o Rooting or reaching for something to suck on  o Sucking motions with mouth  o Fretful noises  o Crying is a late sign of hunger or comfort.   The baby should be positioned and latched on to the breast correctly  o Chest-to-chest, chin in the breast  o Babys lips are flipped outward  o Babys mouth is stretched open wide like a shout  o Babys sucking should feel like tugging to the mother  - The baby should be drinking at the breast  o You should hear an occasional swallow during the feeding  o Switch breasts when the baby takes himself off the breast or falls asleep  o Keep offering  breasts until the baby looks full, no longer gives hunger signs, and stays asleep when placed on his back in the crib  - If the baby is sleepy and wont wake for a feeding, put the baby skin-to-skin dressed in a diaper against the mothers bare chest  - Sleep with your baby near you in the hospital room  - Call the nurse for additional assistance as needed.    Infant actively bf to L side x 16 minutes then unlatched but continues to show feeding cues. Assisted with latching to R side in cross cradle and then in cradle hold once infant actively bf. Infant bf to R side x 15 minutes then sleeping, remains s2s with mother. Mothers questions answered, encouraged to call with any needs, v/u.

## 2022-07-19 NOTE — ANESTHESIA PROCEDURE NOTES
Spinal    Diagnosis:  Section  Patient location during procedure: OR  Start time: 2022 7:24 AM  Timeout: 2022 7:23 AM  End time: 2022 7:25 AM    Staffing  Authorizing Provider: Sanjay Mann CRNA  Performing Provider: Sanjay Mann CRNA    Preanesthetic Checklist  Completed: patient identified, IV checked, site marked, risks and benefits discussed, surgical consent, monitors and equipment checked, pre-op evaluation and timeout performed  Spinal Block  Patient position: sitting  Prep: ChloraPrep  Patient monitoring: heart rate and continuous pulse ox  Approach: midline  Location: L3-4  Injection technique: single shot  CSF Fluid: clear free-flowing CSF  Needle  Needle type: pencil-tip   Needle gauge: 22 G  Needle length: 3.5 in  Additional Documentation: incremental injection, negative aspiration for heme and no paresthesia on injection  Needle localization: anatomical landmarks  Assessment  Sensory level: T4   Dermatomal levels determined by alcohol wipe and pinch or prick  Ease of block: easy  Patient's tolerance of the procedure: comfortable throughout block and no complaints

## 2022-07-19 NOTE — ANESTHESIA POSTPROCEDURE EVALUATION
Anesthesia Post Evaluation    Patient: Aye Caceres    Procedure(s) Performed: Procedure(s) (LRB):  REPEAT  SECTION (N/A)    Final Anesthesia Type: spinal      Patient location during evaluation: labor & delivery  Patient participation: Yes- Able to Participate  Level of consciousness: awake and alert, oriented and awake  Post-procedure vital signs: reviewed and stable  Pain management: adequate  Airway patency: patent    PONV status at discharge: No PONV  Anesthetic complications: no      Cardiovascular status: blood pressure returned to baseline, hemodynamically stable and stable  Respiratory status: unassisted, spontaneous ventilation and room air  Hydration status: euvolemic  Follow-up not needed.          Vitals Value Taken Time   /79 22 0702   Temp 36.5 °C (97.7 °F) 22 0547   Pulse 84 22 0714   Resp 20 22 0530   SpO2 100 % 22 0714   Vitals shown include unvalidated device data.      No case tracking events are documented in the log.      Pain/Jaleesa Score: Pain Rating Prior to Med Admin: 0 (2022  6:02 AM)

## 2022-07-19 NOTE — NURSING
Pt in stable condition. Pt transferred to mother baby unit via bed with infant in arms. S.o. at side and pt mother brought pt belongings up to room in infant crib. Report given to becca salgado and care relinquished.

## 2022-07-19 NOTE — HOSPITAL COURSE
Admitted for scheduled repeat .  Delivered by uncomplicated RLTCS.  POD#1 Doing well with routine post op care.

## 2022-07-19 NOTE — PLAN OF CARE
Patient had repeat c/s for low lying placenta with low transverse incision with sutures, dressing remains clean dry and intake, vs wnl, breast feeding well, bleeding light fundus umbilicus.

## 2022-07-19 NOTE — ANESTHESIA PREPROCEDURE EVALUATION
07/19/2022  Aye Caceres is a 22 y.o., female.      Pre-op Assessment    I have reviewed the Patient Summary Reports.     I have reviewed the Nursing Notes. I have reviewed the NPO Status.   I have reviewed the Medications.     Review of Systems  Anesthesia Hx:  No problems with previous Anesthesia  History of prior surgery of interest to airway management or planning:  Denies Personal Hx of Anesthesia complications.   Social:  Non-Smoker, No Alcohol Use    Hematology/Oncology:  Hematology Normal   Oncology Normal     EENT/Dental:EENT/Dental Normal   Cardiovascular:  Cardiovascular Normal Exercise tolerance: good     Pulmonary:   Asthma mild    Renal/:  Renal/ Normal     Hepatic/GI:  Hepatic/GI Normal    Musculoskeletal:  Musculoskeletal Normal    Neurological:  Neurology Normal    Endocrine:  Endocrine Normal    Dermatological:  Skin Normal    Psych:   Psychiatric History          Physical Exam  General: Well nourished, Cooperative, Alert and Oriented    Airway:  Mallampati: II   Mouth Opening: Normal  TM Distance: Normal  Tongue: Normal  Neck ROM: Normal ROM    Dental:  Intact        Anesthesia Plan  Type of Anesthesia, risks & benefits discussed:    Anesthesia Type: Spinal  Intra-op Monitoring Plan: Standard ASA Monitors  Post Op Pain Control Plan: intrathecal opioid  Informed Consent: Informed consent signed with the Patient and all parties understand the risks and agree with anesthesia plan.  All questions answered. Patient consented to blood products? Yes  ASA Score: 3  Day of Surgery Review of History & Physical: H&P Update referred to the surgeon/provider.I have interviewed and examined the patient. I have reviewed the patient's H&P dated: 7/19/22. There are no significant changes.     Ready For Surgery From Anesthesia Perspective.     .

## 2022-07-19 NOTE — HPI
Pt is a  @ 38  WGA who presents for scheduled repeat  secondary to low lying placenta and h/o  with recs from [facility] to deliver between 37-38 WGA.  No complaints.

## 2022-07-19 NOTE — PROGRESS NOTES
07/19/22 0830   Maternal Assessment   Breast Size Issue none   Breast Shape Bilateral:;round   Breast Density Bilateral:;soft   Areola Bilateral:;elastic   Nipples Bilateral:;everted   Maternal Infant Feeding   Maternal Emotional State relaxed;assist needed   Infant Positioning cradle;cross-cradle;clutch/football   Signs of Milk Transfer audible swallow;infant jaw motion present;suck/swallow ratio   Nipple Shape After Feeding, Left round, everted   Nipple Shape After Feeding, Right round, everted   Equipment Type   Breast Pump Type double electric, personal  (mother has ordered, awaiting delivery)     Upon entering RN assisting mother with latching infant to L breast in football hold. Infant able to latch easily with strong rhythmic pulls, flanged lips and audible swallows. Mother reports feeling tug, no pain with latch. Mother has ordered breast pump, awaiting delivery. Mother reports increase in breast size during pregnancy. Pumped with last baby while in NICU. Basics reviewed during bf.     Basic Breastfeeding Instructions    The more you nurse the baby the more milk you will make.  Avoid bottles and pacifiers for the first 4 weeks.  Feed your baby only breastmik for the first 6 months.  Feed your baby at the earliest sign of hunger or comfort:  Sucking on fingers or hands  Bringing hands toward his mouth  Rooting or reaching for something to suck on  Sucking motions with mouth  Fretful noises  Crying is a late sign of hunger or comfort.  The baby should be positioned and latched on to the breast correctly  Chest-to-chest, chin in the breast  Babys lips are flipped outward  Babys mouth is stretched open wide like a shout  Babys sucking should feel like tugging to the mother  - The baby should be drinking at the breast  You should hear an occasional swallow during the feeding  Switch breasts when the baby takes himself off the breast or falls asleep  Keep offering breasts until the baby looks full, no  longer gives hunger signs, and stays asleep when placed on his back in the crib  - If the baby is sleepy and wont wake for a feeding, put the baby skin-to-skin dressed in a diaper against the mothers bare chest  - Sleep with your baby near you in the hospital room  - Call the nurse for additional assistance as needed.    Infant actively bf to L side x 16 minutes then unlatched but continues to show feeding cues. Assisted with latching to R side in cross cradle and then in cradle hold once infant actively bf. Infant bf to R side x 15 minutes then sleeping, remains s2s with mother. Mothers questions answered, encouraged to call with any needs, v/u.

## 2022-07-19 NOTE — L&D DELIVERY NOTE
[facility] - Labor & Delivery   Section   Operative Note    SUMMARY     Date of Procedure: 2022     Procedure: Procedure(s) (LRB):  REPEAT  SECTION (N/A)    Surgeon(s) and Role:     * Jan Clay MD - Primary    Assisting Surgeon: None    Pre-Operative Diagnosis: History of  delivery, currently pregnant [O34.219]  Low lying placenta nos or without hemorrhage, third trimester [O44.43]    Post-Operative Diagnosis: Post-Op Diagnosis Codes:     * History of  delivery, currently pregnant [O34.219]     * Low lying placenta nos or without hemorrhage, third trimester [O44.43]    Anesthesia: Spinal    Technical Procedures Used: RLTCS           QBL: 340 mL    DVT prophylaxis: Bilateral sequential compression devices    Perioperative antibiotics: Ancef    Specimen: none    Operative Findings: viable female infant in the vertex presentation. Apgars 9 at 1 minute and 9 at 5 minutes. Amniotic fluid clear. Normal appearing uterus, bilateral tubes, and ovaries.      OPERATIVE NOTE:  The patient was taken to the operating room were spinal anesthesia was found to be adequate. She was prepped and draped in the normal sterile fashion in the dorsal supine position. A gill catheter was in place draining clear urine. A pfannenstiel skin incision was then made with the knife and carried through to the underlying layer of fascia with the bovie. The fascia was incised in the midline. The fascial incision was extended laterally with the curved amador scissors. The superior aspect of the incision was grasped with two kocher clamps and elevated up. The superior aspect of the fascial incision was sharply dissected off. The same technique was used to take down the inferior aspect of the fascia. The muscle was  in the midline and the peritoneum was identified. The peritoneum was elevated up with hemastats and entered sharply. The peritoneal incision was then extended superiorly and inferiorly with  good visualization of the surrounding tissue. The bladder blade was inserted and the vesicouterine peritoneum was identified and a bladder flap was made. A low transverse incision was then made on the uterus and the incision was bluntly extended. The infant's head was grasped and brought to the incision. The assistant then applied gentle fundal pressure and the infant was delivered without difficulty. The nose and mouth was suctioned and the cord was clamped and cut. The infant was then handed off to the awaiting nursery personnel. Cord blood was obtained for the infant's blood type. The placenta was removed, the uterus was exteriorized and cleaned of all clot and debris. The uterine incision was repaired with #1 PDS in a running locked fashion. A second layer was used for imbrication. A 2-0 Vicryl was used to close the bladder flap.  The uterus was returned to the abdomen. A 2-0 Vicryl was used to re-approximate the peritoneum. The muscle was re-approximated with 2-0 Chromic. The fascia was then closed with 0 Vicryl in a running fashion. The subcutaneous tissue was re-approximated with 3-0 Vicryl. The skin was closed with 3-0 Vicryl on a Alessandro needle.  An Aquacel dressing was placed over the incision. The patient tolerated the procedure well. Sponge, needle, and instrument counts were correct x2.          Delivery Information for Jonna Caceres    Birth information:  YOB: 2020   Time of birth: 7:45 AM   Sex: female   Head Delivery Date/Time: 2022  7:45 AM   Delivery type: , Low Transverse   Gestational Age: 38w1d    Delivery Providers    Delivering clinician: Jan Clay MD           Measurements    Weight: (No Documentation)  Length: (No Documentation)         Apgars    Living status: Living  Apgars:  1 min.:  5 min.:  10 min.:  15 min.:  20 min.:    Skin color:  1  1       Heart rate:  2  2       Reflex irritability:  2  2       Muscle tone:  2  2       Respiratory  effort:  2  2       Total:  9  9       Apgars assigned by: TAMIKORN                   Presentation    Presentation: Vertex           Interventions/Resuscitation    Method: Bulb Suctioning, Tactile Stimulation       Cord    Vessels: 3 vessels  Complications: None  Delayed Cord Clamping?: Yes  Cord Clamped Date/Time: 2022  7:47 AM  Cord Blood Disposition: Discarded  Gases Sent?: No  Stem Cell Collection (by MD): No       Placenta    Placenta delivery date/time: 2022 0748  Placenta removal: Manual removal  Placenta appearance: Intact  Placenta disposition: discarded           Labor Events:       labor:       Labor Onset Date/Time:         Dilation Complete Date/Time:         Start Pushing Date/Time:         Start Pushing Date/Time:       Rupture Date/Time:            Rupture type:          Fluid Amount:       Fluid Color:       Fluid Odor:       Membrane Status:                steroids:       Antibiotics given for GBS:       Induction:       Indications for induction:        Augmentation:       Indications for augmentation:       Labor complications:       Additional complications:          Cervical ripening:                     Delivery:      Episiotomy:       Indication for Episiotomy:       Perineal Lacerations:   Repaired:      Periurethral Laceration:   Repaired:     Labial Laceration:   Repaired:     Sulcus Laceration:   Repaired:     Vaginal Laceration:   Repaired:     Cervical Laceration:   Repaired:     Repair suture:       Repair # of packets:       Last Value - EBL - Nursing (mL):       Sum - EBL - Nursing (mL): 0     Last Value - EBL - Anesthesia (mL):      Calculated QBL (mL):       Vaginal Sweep Performed:       Surgicount Correct:         Other providers:       Anesthesia    Method: Spinal          Details (if applicable):  Trial of Labor No    Categorization: Repeat    Priority: Routine   Indications for : Other (Add Comments)   Incision  Type:       Additional  information:  Forceps:    Vacuum:    Breech:    Observed anomalies    Other (Comments): Repeat c/s for low lying placenta

## 2022-07-19 NOTE — SUBJECTIVE & OBJECTIVE
Obstetric HPI:  Patient reports no contractions, active fetal movement, No vaginal bleeding , No loss of fluid     This pregnancy has been complicated by low lying placenta, h/o     OB History    Para Term  AB Living   2 1 0 1 0 1   SAB IAB Ectopic Multiple Live Births   0 0 0 0 1      # Outcome Date GA Lbr Quinten/2nd Weight Sex Delivery Anes PTL Lv   2 Current            1  21 30w0d  1.247 kg (2 lb 12 oz) F CS-LTranv  Y MCKENZIE      Birth Comments: Pre-e     Past Medical History:   Diagnosis Date    Asthma      Past Surgical History:   Procedure Laterality Date     SECTION         PTA Medications   Medication Sig    albuterol 2.5 mg /3 mL (0.083 %) Nebu 3 mL, albuterol 5 mg/mL Nebu 0.5 mL Inhale into the lungs.    aspirin 81 MG Chew Take 1 tablet (81 mg total) by mouth once daily. (Patient not taking: No sig reported)    docusate sodium (DULCOLAX STOOL SOFTENER, DSS, ORAL) Take by mouth.    fluticasone propionate (FLONASE) 50 mcg/actuation nasal spray 1 spray (50 mcg total) by Each Nostril route once daily.    fluticasone propionate (FLONASE) 50 mcg/actuation nasal spray 1 spray (50 mcg total) by Each Nostril route once daily.    loratadine (CLARITIN) 10 mg tablet Take 1 tablet (10 mg total) by mouth once daily.    montelukast (SINGULAIR) 10 mg tablet Take 1 tablet (10 mg total) by mouth every evening.    omeprazole (PRILOSEC) 40 MG capsule Take 1 capsule (40 mg total) by mouth every morning. for 30 doses    ondansetron (ZOFRAN) 4 MG tablet Take 4 mg by mouth every 8 (eight) hours as needed.    ondansetron (ZOFRAN-ODT) 8 MG TbDL Take 1 tablet (8 mg total) by mouth 3 (three) times daily as needed.    PNV62-FA-om3-dha-epa-fish oil (PRENATAL GUMMY) 400 mcg-35 mg -25 mg-5 mg Chew Take by mouth.    sertraline (ZOLOFT) 100 MG tablet Take 0.5 tablets (50 mg total) by mouth once daily.       Review of patient's allergies indicates:  No Known Allergies     Family History    None        Tobacco Use    Smoking status: Never Smoker    Smokeless tobacco: Never Used   Substance and Sexual Activity    Alcohol use: No    Drug use: No    Sexual activity: Yes     Partners: Male     Birth control/protection: None     Comment: single      Review of Systems   Constitutional:  Negative for activity change, appetite change, chills, diaphoresis, fatigue and fever.   Eyes:  Negative for visual disturbance.   Respiratory:  Negative for cough, shortness of breath and wheezing.    Cardiovascular:  Negative for chest pain and palpitations.   Gastrointestinal:  Negative for abdominal pain, constipation, diarrhea, nausea and vomiting.   Genitourinary:  Negative for dysuria, genital sores, pelvic pain, urgency, vaginal bleeding, vaginal discharge, vaginal pain and vaginal odor.   Musculoskeletal:  Negative for back pain, joint swelling and myalgias.   Integumentary:  Negative for rash.   Neurological:  Negative for seizures, syncope, numbness and headaches.   Hematological:  Negative for adenopathy. Does not bruise/bleed easily.   Psychiatric/Behavioral:  Negative for depression. The patient is not nervous/anxious.     Objective:     Vital Signs (Most Recent):  Temp: 97.7 °F (36.5 °C) (07/19/22 0530)  Pulse: 76 (07/19/22 0616)  Resp: 20 (07/19/22 0530)  BP: 138/83 (07/19/22 0616)  SpO2: 97 % (07/19/22 0530) Vital Signs (24h Range):  Temp:  [97.7 °F (36.5 °C)] 97.7 °F (36.5 °C)  Pulse:  [76-90] 76  Resp:  [20] 20  SpO2:  [97 %] 97 %  BP: (118-138)/(75-88) 138/83     Weight: 112 kg (247 lb)  Body mass index is 41.1 kg/m².    FHT: 135 Cat 1 (reassuring)  TOCO:  Quiet    Physical Exam:   Constitutional: She is oriented to person, place, and time. She appears well-developed and well-nourished. No distress.    HENT:   Head: Normocephalic and atraumatic.    Eyes: Conjunctivae and EOM are normal.      Pulmonary/Chest: Effort normal. No respiratory distress.                  Musculoskeletal: Normal range of motion.        Neurological: She is alert and oriented to person, place, and time.    Skin: Skin is warm and dry.    Psychiatric: She has a normal mood and affect. Her behavior is normal. Judgment and thought content normal.       Significant Labs:  Lab Results   Component Value Date    GROUPTRH A POS 07/18/2022    HEPBSAG Negative 09/09/2020    STREPBCULT No Group B Streptococcus isolated 07/05/2022       I have personallly reviewed all pertinent lab results from the last 24 hours.  Recent Lab Results         07/19/22  0545   07/18/22  1330   07/18/22  1329        Baso #     0.03       Basophil %     0.3       Differential Method     Automated       Eos #     0.0       Eosinophil %     0.3       Gran # (ANC)     8.6       Gran %     72.8       Group & Rh     A POS       Hematocrit     32.4       Hemoglobin     10.0       Immature Grans (Abs)     0.04  Comment: Mild elevation in immature granulocytes is non specific and   can be seen in a variety of conditions including stress response,   acute inflammation, trauma and pregnancy. Correlation with other   laboratory and clinical findings is essential.         Immature Granulocytes     0.3       INDIRECT KAYLYN     NEG       Lymph #     2.3       Lymph %     19.7       MCH     24.9       MCHC     30.9       MCV     81       Mono #     0.8       Mono %     6.6       MPV     9.9       nRBC     0       Platelets     279        Acceptable Yes           RBC     4.01       RDW     14.7       RPR   Non-reactive         SARS Coronavirus 2 Antigen Negative           WBC     11.81

## 2022-07-20 VITALS
DIASTOLIC BLOOD PRESSURE: 56 MMHG | BODY MASS INDEX: 41.15 KG/M2 | HEART RATE: 95 BPM | TEMPERATURE: 98 F | RESPIRATION RATE: 16 BRPM | HEIGHT: 65 IN | WEIGHT: 247 LBS | OXYGEN SATURATION: 98 % | SYSTOLIC BLOOD PRESSURE: 118 MMHG

## 2022-07-20 PROBLEM — O26.813 PREGNANCY RELATED FATIGUE IN THIRD TRIMESTER: Status: RESOLVED | Noted: 2022-05-12 | Resolved: 2022-07-20

## 2022-07-20 PROBLEM — O26.899 ABDOMINAL PAIN COMPLICATING PREGNANCY: Status: RESOLVED | Noted: 2022-07-10 | Resolved: 2022-07-20

## 2022-07-20 PROBLEM — Z98.891 S/P REPEAT LOW TRANSVERSE C-SECTION: Status: ACTIVE | Noted: 2022-07-19

## 2022-07-20 PROBLEM — R10.9 ABDOMINAL PAIN COMPLICATING PREGNANCY: Status: RESOLVED | Noted: 2022-07-10 | Resolved: 2022-07-20

## 2022-07-20 PROBLEM — B34.9 VIRAL SYNDROME: Status: RESOLVED | Noted: 2022-05-12 | Resolved: 2022-07-20

## 2022-07-20 LAB
BASOPHILS # BLD AUTO: 0.02 K/UL (ref 0–0.2)
BASOPHILS NFR BLD: 0.2 % (ref 0–1.9)
DIFFERENTIAL METHOD: ABNORMAL
EOSINOPHIL # BLD AUTO: 0 K/UL (ref 0–0.5)
EOSINOPHIL NFR BLD: 0.3 % (ref 0–8)
ERYTHROCYTE [DISTWIDTH] IN BLOOD BY AUTOMATED COUNT: 15 % (ref 11.5–14.5)
HCT VFR BLD AUTO: 25.8 % (ref 37–48.5)
HGB BLD-MCNC: 8 G/DL (ref 12–16)
IMM GRANULOCYTES # BLD AUTO: 0.05 K/UL (ref 0–0.04)
IMM GRANULOCYTES NFR BLD AUTO: 0.6 % (ref 0–0.5)
LYMPHOCYTES # BLD AUTO: 1.2 K/UL (ref 1–4.8)
LYMPHOCYTES NFR BLD: 13.2 % (ref 18–48)
MCH RBC QN AUTO: 25 PG (ref 27–31)
MCHC RBC AUTO-ENTMCNC: 31 G/DL (ref 32–36)
MCV RBC AUTO: 81 FL (ref 82–98)
MONOCYTES # BLD AUTO: 0.6 K/UL (ref 0.3–1)
MONOCYTES NFR BLD: 7.3 % (ref 4–15)
NEUTROPHILS # BLD AUTO: 6.9 K/UL (ref 1.8–7.7)
NEUTROPHILS NFR BLD: 78.4 % (ref 38–73)
NRBC BLD-RTO: 0 /100 WBC
PLATELET # BLD AUTO: 198 K/UL (ref 150–450)
PMV BLD AUTO: 9.7 FL (ref 9.2–12.9)
RBC # BLD AUTO: 3.2 M/UL (ref 4–5.4)
WBC # BLD AUTO: 8.78 K/UL (ref 3.9–12.7)

## 2022-07-20 PROCEDURE — 25000003 PHARM REV CODE 250: Performed by: OBSTETRICS & GYNECOLOGY

## 2022-07-20 PROCEDURE — 36415 COLL VENOUS BLD VENIPUNCTURE: CPT | Performed by: OBSTETRICS & GYNECOLOGY

## 2022-07-20 PROCEDURE — 99238 HOSP IP/OBS DSCHRG MGMT 30/<: CPT | Mod: ,,, | Performed by: OBSTETRICS & GYNECOLOGY

## 2022-07-20 PROCEDURE — 99238 PR HOSPITAL DISCHARGE DAY,<30 MIN: ICD-10-PCS | Mod: ,,, | Performed by: OBSTETRICS & GYNECOLOGY

## 2022-07-20 PROCEDURE — 85025 COMPLETE CBC W/AUTO DIFF WBC: CPT | Performed by: OBSTETRICS & GYNECOLOGY

## 2022-07-20 PROCEDURE — 63600175 PHARM REV CODE 636 W HCPCS: Performed by: OBSTETRICS & GYNECOLOGY

## 2022-07-20 RX ORDER — IBUPROFEN 800 MG/1
800 TABLET ORAL EVERY 8 HOURS
Qty: 30 TABLET | Refills: 0 | Status: SHIPPED | OUTPATIENT
Start: 2022-07-20 | End: 2022-08-31

## 2022-07-20 RX ORDER — HYDROCODONE BITARTRATE AND ACETAMINOPHEN 5; 325 MG/1; MG/1
1 TABLET ORAL EVERY 6 HOURS PRN
Qty: 20 TABLET | Refills: 0 | Status: SHIPPED | OUTPATIENT
Start: 2022-07-20 | End: 2022-07-27

## 2022-07-20 RX ADMIN — ACETAMINOPHEN 650 MG: 325 TABLET ORAL at 08:07

## 2022-07-20 RX ADMIN — PRENATAL VIT W/ FE FUMARATE-FA TAB 27-0.8 MG 1 TABLET: 27-0.8 TAB at 08:07

## 2022-07-20 RX ADMIN — ACETAMINOPHEN 650 MG: 325 TABLET ORAL at 03:07

## 2022-07-20 RX ADMIN — ACETAMINOPHEN 650 MG: 325 TABLET ORAL at 02:07

## 2022-07-20 RX ADMIN — DOCUSATE SODIUM 200 MG: 100 CAPSULE, LIQUID FILLED ORAL at 08:07

## 2022-07-20 RX ADMIN — IBUPROFEN 800 MG: 800 TABLET, FILM COATED ORAL at 08:07

## 2022-07-20 RX ADMIN — KETOROLAC TROMETHAMINE 30 MG: 30 INJECTION, SOLUTION INTRAMUSCULAR at 02:07

## 2022-07-20 NOTE — NURSING
0942- patient coming from bathroom feeling light headed, and hot,  walked with patient back to bed, sat up on side of bed, BP check, encouraged to lay down in bed until feeling subsides.Pt stated that she had not eaten breakfast.  1012- patient lying down in bed with HOB elevated, has personal fan on, states feeling better. BP-119/60, will continue to monitor.  1147- remains in bed, states feels better, holding NB,  BP-127/60.

## 2022-07-20 NOTE — PLAN OF CARE
Has met discharge criteria, has been doing own self care, has had no episodes of syncope, VS WNL, adequate output, tolerating meals, breastfeeding NB, assist by lactation nurse this shift, feels comfortable with breastfeeding on her own, discharge instructions reviewed with patient and family member, copy of instructions given to patient, VU, denies needs, concerns at present.Used Breastfeeding Guide and reviewed first alert form, importance/ benefits of exclusive breastfeeding for 6 months, proper handling and storage of breast milk, and all resources available after leaving the hospital. Questions/ Concerns answered. Mother verbalized understanding.

## 2022-07-20 NOTE — LACTATION NOTE
Mother reports that infant just finished bf, confident going well. Basics / goals reviewed. Reviewed feeding with cues, waking for feedings if needed, importance of 8 or more in 24, phases of milk, supply, and cluster feeding / second night. Mother denies questions or needs at this time. Hopeful for d/c today if possible. Encouraged to call with next feding for OL, mother v/u.

## 2022-07-20 NOTE — LACTATION NOTE
07/20/22 1402   Maternal Assessment   Breast Size Issue none   Breast Shape Bilateral:;round   Breast Density Bilateral:;soft;filling  (mother reports feeling more full)   Areola Bilateral:;elastic   Nipples Bilateral:;everted   Maternal Infant Feeding   Maternal Emotional State relaxed;independent   Infant Positioning clutch/football   Signs of Milk Transfer audible swallow;infant jaw motion present;suck/swallow ratio   Pain with Feeding no   Nipple Shape After Feeding, Left round, everted   Latch Assistance no   Equipment Type   Breast Pump Type double electric, personal  (mother has ordered)   Lactation Referrals   Lactation Referrals outpatient lactation program;pediatric care provider;support group   Outpatient Lactation Program Lactation Follow-up Date/Time as needed / desired   Pediatric Care Provider Lactation Follow-up Date/Time as scheduled / needed   Support Group Lactation Follow-up Date/Time as desired     Mother called for OL. Confident that bf is going well. Mother able to position and latch infant to L breast in football hold without assistance. Infant noted with flanged lips, wide gape and string rhythmic pulls and audible swallows. Mother reports feeling more fullness to breast. Denies any pain with bf or latch. Plan is for d/c today. Will f/u with ped. Tomorrow. Mother reports taking singular daily for allergies, reviewed not recommended while bf, mother states that she can take an alternative and will call her PCP.      Breastfeeding discharge instructions given with First Alert form and reviewed. Please complete First Alert within 3-5 days after the baby's birth. ( Please call the Breastfeeding Warmline ( [phone]) or the baby's pediatrician if you have any concerns.     Also discussed:  AAP recommendation of exclusive breastfeeding for the first 6 months of life and continued breastfeeding with the introduction of supplemental foods beyond the first year of life. The AAP recommends  breastfeeding for for at least a year or longer. Instructed on the recommendation to delay all bottle and pacifier use until after 4 weeks of age and breastfeeding is well established.  Discussed the benefits of exclusive breastfeeding for both mother and baby.  Discussed the risks of supplementation/pacifier use on the exclusivity of breastfeeding in the first 6 months. Feed the baby at the earliest sign of hunger or comfort  Hands to mouth, sucking motions  Rooting or searching for something to suck on  Dont wait for crying - it is a not a late sign of hunger; it is a sign of distress    The feedings may be 8-12 times per 24hrs and will not follow a schedule  Alternate the breast you start the feeding with, or start with the breast that feels the fullest  Switch breasts when the baby takes himself off the breast or falls asleep  Keep offering breasts until the baby looks full, no longer gives hunger signs, and stays asleep when placed on his back in the crib  If the baby is sleepy and wont wake for a feeding, put the baby skin-to-skin dressed in a diaper against the mothers bare chest  Sleep near your baby  The baby should be positioned and latched on to the breast correctly  Chest-to-chest, chin in the breast  Babys lips are flipped outward  Babys mouth is stretched open wide like a shout  Babys sucking should feel like tugging to the mother  The baby should be drinking at the breast:  You should hear swallowing or gulping throughout the feeding  You should see milk on the babys lips when he comes off the breast  Your breasts should be softer when the baby is finished feeding  The baby should look relaxed at the end of feedings  After the 4th day and your milk is in:  The babys poop should turn bright yellow and be loose, watery, and seedy  The baby should have at least 3-4 poops the size of the palm of your hand per day  The baby should have at least 6-8 wet diapers per day  The urine should be light  yellow in color  You should drink when you are thirsty and eat a healthy diet when you are    hungry.     Take naps to get the rest you need.   Take medications and/or drink alcohol only with permission of your obstetrician    or the babys pediatrician.  You can also call the [facility],   ([phone]), Monday-Friday, 8am-5pm Central time, to get the most   up-to-date evidence-based information on the use of medications during   pregnancy and breastfeeding.      The baby should be examined by a pediatrician at 3-5 days of age and again at 2 weeks of age unless ordered sooner by the pediatrician.  Once your milk production increases the baby should gain at least 1/2-1oz each day and should be back to birth weight no later than 10-14 days of age.If this is not the case, please call the Breastfeeding Warmline ( [phone]) for assistance and support.     Mothers questions answered. Unit phone number and community resources provided and reviewed, encouraged to call with any needs, v/u.

## 2022-07-20 NOTE — SUBJECTIVE & OBJECTIVE
Interval History: POD#1    She is doing well this morning. She is tolerating a regular diet without nausea or vomiting. She is voiding spontaneously. She is ambulating. She has passed flatus, and has not a BM. Vaginal bleeding is mild. She denies fever or chills. Abdominal pain is mild and controlled with oral medications. She Is breastfeeding. She desires circumcision for her male baby: not applicable.    Objective:     Vital Signs (Most Recent):  Temp: 98.2 °F (36.8 °C) (07/20/22 0253)  Pulse: 77 (07/20/22 0253)  Resp: (Abnormal) 22 (07/20/22 0253)  BP: 131/70 (07/20/22 0253)  SpO2: 96 % (07/20/22 0253)   Vital Signs (24h Range):  Temp:  [97.2 °F (36.2 °C)-98.5 °F (36.9 °C)] 98.2 °F (36.8 °C)  Pulse:  [66-91] 77  Resp:  [16-22] 22  SpO2:  [93 %-99 %] 96 %  BP: (113-135)/(59-77) 131/70     Weight: 112 kg (247 lb)  Body mass index is 41.1 kg/m².      Intake/Output Summary (Last 24 hours) at 7/20/2022 0840  Last data filed at 7/19/2022 2130  Gross per 24 hour   Intake no documentation   Output 1690 ml   Net -1690 ml         Significant Labs:  Lab Results   Component Value Date    GROUPTRH A POS 07/18/2022    HEPBSAG Negative 09/09/2020    STREPBCULT No Group B Streptococcus isolated 07/05/2022     Recent Labs   Lab 07/20/22  0623   HGB 8.0*   HCT 25.8*       I have personallly reviewed all pertinent lab results from the last 24 hours.    Physical Exam:   Constitutional: She is oriented to person, place, and time. She appears well-developed and well-nourished. No distress.    HENT:   Head: Normocephalic and atraumatic.    Eyes: Conjunctivae and EOM are normal.      Pulmonary/Chest: Effort normal.        Abdominal: Soft.   Bandage c/d/i     Genitourinary:    Genitourinary Comments: Fundus firm below umbilicus  Lochia minimal             Musculoskeletal: Normal range of motion. No tenderness.       Neurological: She is alert and oriented to person, place, and time.    Skin: Skin is warm and dry.    Psychiatric: She has  a normal mood and affect. Her behavior is normal. Thought content normal.

## 2022-07-20 NOTE — DISCHARGE SUMMARY
[facility]  Obstetrics  Discharge Summary      Patient Name: Aey Caceres  MRN: [id number]  Admission Date: 2022  Hospital Length of Stay: 1 days  Discharge Date and Time:  2022 3:38 PM  Attending Physician: Jan Clay MD   Discharging Provider: Jan Clay MD   Primary Care Provider: Tiffany Harding NP    HPI: Pt is a  @ 38 1/ WGA who presents for scheduled repeat  secondary to low lying placenta and h/o  with recs from [facility] to deliver between 37-38 WGA.  No complaints.           Procedure(s) (LRB):  REPEAT  SECTION (N/A)     Hospital Course:   Admitted for scheduled repeat .  Delivered by uncomplicated RLTCS.  POD#1 Doing well with routine post op care.        Consults (From admission, onward)        Status Ordering Provider     Inpatient consult to Lactation  Once        Provider:  (Not yet assigned)    Acknowledged JAN CLAY     Inpatient consult to Lactation  Once        Provider:  (Not yet assigned)    Acknowledged MIAN CALVILLO     Inpatient consult to Anesthesiology  Once        Provider:  (Not yet assigned)    Acknowledged JAN CLAY          Final Active Diagnoses:    Diagnosis Date Noted POA    PRINCIPAL PROBLEM:  S/P repeat low transverse  [Z98.891] 2022 Not Applicable    Low-lying placenta [O44.40] 2022 Yes    Mild episode of recurrent major depressive disorder [F33.0] 2019 Yes      Problems Resolved During this Admission:        Significant Diagnostic Studies: Labs: All labs within the past 24 hours have been reviewed      Feeding Method: breast    Immunizations     None          Delivery:    Episiotomy:     Lacerations:     Repair suture:     Repair # of packets:     Blood loss (ml):       Birth information:  YOB: 2020   Time of birth: 7:45 AM   Sex: female   Delivery type: , Low Transverse   Gestational Age: 38w1d    Delivery  Clinician:      Other providers:       Additional  information:  Forceps:    Vacuum:    Breech:    Observed anomalies      Living?:           APGARS  One minute Five minutes Ten minutes   Skin color:         Heart rate:         Grimace:         Muscle tone:         Breathing:         Totals: 9  9        Placenta: Delivered:       appearance    Pending Diagnostic Studies:     None          Discharged Condition: good    Disposition: Home or Self Care    Follow Up:   Follow-up Information     Jan Clay MD Follow up in 1 week(s).    Specialty: Obstetrics and Gynecology  Why: postop follow up  Contact information:  [place] [id number]  [phone]                       Patient Instructions:      Diet Adult Regular     Lifting restrictions   Order Comments: No lifting greater than 15 pounds     Pelvic Rest     Leave dressing on - Keep it clean, dry, and intact until clinic visit     Notify your health care provider if you experience any of the following:  temperature >100.4     Notify your health care provider if you experience any of the following:  persistent nausea and vomiting or diarrhea     Notify your health care provider if you experience any of the following:  severe uncontrolled pain     Notify your health care provider if you experience any of the following:  redness, tenderness, or signs of infection (pain, swelling, redness, odor or green/yellow discharge around incision site)     Notify your health care provider if you experience any of the following:  difficulty breathing or increased cough     Notify your health care provider if you experience any of the following:  severe persistent headache     Notify your health care provider if you experience any of the following:  worsening rash     Notify your health care provider if you experience any of the following:  persistent dizziness, light-headedness, or visual disturbances     Notify your health care provider if you experience  any of the following:  increased confusion or weakness     Notify your health care provider if you experience any of the following:   Order Comments: Heavy vaginal bleeding (saturating 2 pads in 1 hour)     Activity as tolerated     Medications:  Current Discharge Medication List      START taking these medications    Details   HYDROcodone-acetaminophen (NORCO) 5-325 mg per tablet Take 1 tablet by mouth every 6 (six) hours as needed for Pain.  Qty: 20 tablet, Refills: 0      ibuprofen (ADVIL,MOTRIN) 800 MG tablet Take 1 tablet (800 mg total) by mouth every 8 (eight) hours.  Qty: 30 tablet, Refills: 0         CONTINUE these medications which have NOT CHANGED    Details   albuterol 2.5 mg /3 mL (0.083 %) Nebu 3 mL, albuterol 5 mg/mL Nebu 0.5 mL Inhale into the lungs.      docusate sodium (DULCOLAX STOOL SOFTENER, DSS, ORAL) Take by mouth.      !! fluticasone propionate (FLONASE) 50 mcg/actuation nasal spray 1 spray (50 mcg total) by Each Nostril route once daily.  Qty: 16 g, Refills: 3    Associated Diagnoses: Nasal congestion      !! fluticasone propionate (FLONASE) 50 mcg/actuation nasal spray 1 spray (50 mcg total) by Each Nostril route once daily.  Qty: 9.9 mL, Refills: 0    Associated Diagnoses: Nasal congestion      loratadine (CLARITIN) 10 mg tablet Take 1 tablet (10 mg total) by mouth once daily.  Qty: 30 tablet, Refills: 5    Associated Diagnoses: Allergic sinusitis      montelukast (SINGULAIR) 10 mg tablet Take 1 tablet (10 mg total) by mouth every evening.  Qty: 30 tablet, Refills: 5    Associated Diagnoses: Mild intermittent asthma without complication      omeprazole (PRILOSEC) 40 MG capsule Take 1 capsule (40 mg total) by mouth every morning. for 30 doses  Qty: 30 capsule, Refills: 5    Associated Diagnoses: Colicky RUQ abdominal pain      ondansetron (ZOFRAN) 4 MG tablet Take 4 mg by mouth every 8 (eight) hours as needed.      ondansetron (ZOFRAN-ODT) 8 MG TbDL Take 1 tablet (8 mg total) by mouth 3  (three) times daily as needed.  Qty: 30 tablet, Refills: 0    Associated Diagnoses: Colicky RUQ abdominal pain      PNV62-FA-om3-dha-epa-fish oil (PRENATAL GUMMY) 400 mcg-35 mg -25 mg-5 mg Chew Take by mouth.      sertraline (ZOLOFT) 100 MG tablet Take 0.5 tablets (50 mg total) by mouth once daily.  Qty: 30 tablet, Refills: 4    Associated Diagnoses: Anxiety disorder affecting pregnancy, antepartum       !! - Potential duplicate medications found. Please discuss with provider.      STOP taking these medications       aspirin 81 MG Chew Comments:   Reason for Stopping:               Jan Clay MD  Obstetrics  [facility]

## 2022-07-20 NOTE — DISCHARGE INSTRUCTIONS
Postpartum Discharge Instructions:    No heavy lifting, straining, frequent rest periods  Pelvic rest--no douching, tampons, or intercourse until released by MD  Talk to your doctor about birth control--remember breastfeeding is not a method of birth control  Notify MD if bleeding becomes heavier than usual and if large clots, painful cramping,or foul odor develops.   Vaginal discharge will lighten and decrease in amount gradually.    Cleanse perineal area from front to back after urination or having a bowel movement.  If not breastfeeding, wear tight fitting sports bra for 1 week--remove only to bathe  Remember to keep your breast clean and dry to prevent any cracking  Notify MD if breast become reddened,swollen, nipples bleed or crack, or fever greater than 100.4  Notify MD of pain, swelling,  Redness, or heat developing in back of leg especially when foot is flexed toward body  Look at incision everyday for redness, swelling, or drainage which may indicate infection  Notify MD of pain not relieved by pain medication.  Call MD or go to ER for any concerns    Understanding Preeclampsia: Important points for all to know!  Preeclampsia is pregnancy-related hypertension that develops after 20 weeks' gestation & can develop up to 6 weeks after delivery. It can lead to health risks for you and your baby. No one knows what causes preeclampsia. But it is known that the only cure is delivery.     Your blood pressure will be monitored regularly throughout your pregnancy to help check for preeclampsia.   Signs and symptoms  A common sign of preeclampsia is high blood pressure. Other signs and symptoms may include:  Rapid weight gain  Protein in your urine  Headache  Abdominal pain on your right side  Vision problems (flashes or spots)  Edema (swelling) in your face or hands (this also commonly happens near the end of normal pregnancies, even without preeclampsia)  Tests you may have  Your healthcare provider will want to  check your blood pressure throughout your pregnancy. If your blood pressure is high, you may have the following tests:  Urine tests to look for protein  Blood tests to confirm preeclampsia  Fetal monitoring to ensure that your baby is healthy  Treating preeclampsia  A daily low dose of aspirin may be prescribed to those at risk for preeclampsia. Preeclampsia almost always ends soon after you give birth. Until then, your healthcare provider can help manage your condition. If your symptoms are mild, you may need bed rest at home. If your symptoms are severe, you will be hospitalized. Hospital treatment includes:  Complete bed rest to help control blood pressure  Magnesium IV (intravenous) drip during labor to prevent seizures  Induced labor or surgical delivery by  section  When to call your healthcare provider  Call your healthcare provider if swelling, weight gain, or other symptoms come on quickly or are severe. Some cases of preeclampsia are more severe than others. Your signs and symptoms also may change or worsen as you get closer to your due date.  Whos at risk?  Preeclampsia can happen in any pregnant woman. Factors that increase the risk include:  Previous pregnancies. Preeclampsia, intrauterine growth retardation (IUGR),  birth, placental abruption, or fetal death  Medical history of mother. Diabetes, high blood pressure, obesity, kidney disease, autoimmune disease (for example lupus), or family history of preeclampsia  Current pregnancy. First pregnancy, multiple fetuses, over the age of 40 years, or in vitro fertilization  Dangers of preeclampsia  If not treated, preeclampsia can cause problems for you and your baby. The placenta (organ that nourishes your baby) may tear away from the uterine wall. This can lead to fetal distress (the baby is at risk for health problems) and premature delivery. Preeclampsia can also cause these health problems:  Kidney failure or other organ  damage  Seizures  Stroke  Once you give birth  In most cases, preeclampsia goes away on its own soon after you give birth. Within days of delivery, your blood pressure, swelling, and other signs should decrease.  Date Last Reviewed: 6/1/2016  © 3977-3635 [employer]. [address] [place] [address], [place] [address]. All rights reserved. This information is not intended as a substitute for professional medical care. Always follow your healthcare professional's instructions.

## 2022-07-20 NOTE — PLAN OF CARE
Stable shift for patient. Vital signs WNL. Lochia appropriate. No drainage on abdominal dressing. The area directly below the incision is slightly pink. Patient up to bathroom to void post-catheter removal. Ambulating in room independently.     Patient reports right shoulder pain at times throughout the night, in addition to incisional pain. Pain was mostly alleviated with scheduled toradol and tylenol. Patient did take one dose of oxycodone PO. Simethicone also offered, but patient declined. Patient was able to pass gas as shift progressed.    Patient breastfeeding baby, gaining independence with feeds.    See flowsheets  for additional details.

## 2022-07-20 NOTE — PROGRESS NOTES
[facility] Baby Unit  Obstetrics  Postpartum Progress Note    Patient Name: Aye Caceres  MRN: [id number]  Admission Date: 2022  Hospital Length of Stay: 1 days  Attending Physician: Jan Clay MD  Primary Care Provider: Tiffany Harding NP    Subjective:     Principal Problem:S/P repeat low transverse     Hospital Course:  Admitted for scheduled repeat .  Delivered by uncomplicated RLTCS.  POD#1 Doing well with routine post op care.       Interval History: POD#1    She is doing well this morning. She is tolerating a regular diet without nausea or vomiting. She is voiding spontaneously. She is ambulating. She has passed flatus, and has not a BM. Vaginal bleeding is mild. She denies fever or chills. Abdominal pain is mild and controlled with oral medications. She Is breastfeeding. She desires circumcision for her male baby: not applicable.    Objective:     Vital Signs (Most Recent):  Temp: 98.2 °F (36.8 °C) (22)  Pulse: 77 (22)  Resp: (Abnormal) 22 (22)  BP: 131/70 (22)  SpO2: 96 % (22)   Vital Signs (24h Range):  Temp:  [97.2 °F (36.2 °C)-98.5 °F (36.9 °C)] 98.2 °F (36.8 °C)  Pulse:  [66-91] 77  Resp:  [16-22] 22  SpO2:  [93 %-99 %] 96 %  BP: (113-135)/(59-77) 131/70     Weight: 112 kg (247 lb)  Body mass index is 41.1 kg/m².      Intake/Output Summary (Last 24 hours) at 2022 0840  Last data filed at 2022 2130  Gross per 24 hour   Intake no documentation   Output 1690 ml   Net -1690 ml         Significant Labs:  Lab Results   Component Value Date    GROUPTRH A POS 2022    HEPBSAG Negative 2020    STREPBCULT No Group B Streptococcus isolated 2022     Recent Labs   Lab 22  0623   HGB 8.0*   HCT 25.8*       I have personallly reviewed all pertinent lab results from the last 24 hours.    Physical Exam:   Constitutional: She is oriented to person, place, and time. She appears  well-developed and well-nourished. No distress.    HENT:   Head: Normocephalic and atraumatic.    Eyes: Conjunctivae and EOM are normal.      Pulmonary/Chest: Effort normal.        Abdominal: Soft.   Bandage c/d/i     Genitourinary:    Genitourinary Comments: Fundus firm below umbilicus  Lochia minimal             Musculoskeletal: Normal range of motion. No tenderness.       Neurological: She is alert and oriented to person, place, and time.    Skin: Skin is warm and dry.    Psychiatric: She has a normal mood and affect. Her behavior is normal. Thought content normal.     Assessment/Plan:     22 y.o. female  for:    * S/P repeat low transverse   Routine post op care    Low-lying placenta  S/p RLTCS      Mild episode of recurrent major depressive disorder  Zoloft restarted last night.  Continue outpatient.         Disposition: As patient meets milestones, will plan to discharge today.    Jan Clay MD  Obstetrics  [place] - Mother Baby Unit

## 2022-07-27 ENCOUNTER — POSTPARTUM VISIT (OUTPATIENT)
Dept: OBSTETRICS AND GYNECOLOGY | Facility: CLINIC | Age: 22
End: 2022-07-27
Payer: MEDICAID

## 2022-07-27 VITALS
HEIGHT: 65 IN | WEIGHT: 227.81 LBS | HEART RATE: 87 BPM | DIASTOLIC BLOOD PRESSURE: 84 MMHG | SYSTOLIC BLOOD PRESSURE: 124 MMHG | BODY MASS INDEX: 37.95 KG/M2 | RESPIRATION RATE: 14 BRPM

## 2022-07-27 DIAGNOSIS — Z51.89 VISIT FOR WOUND CHECK: ICD-10-CM

## 2022-07-27 DIAGNOSIS — Z98.891 S/P CESAREAN SECTION: Primary | ICD-10-CM

## 2022-07-27 DIAGNOSIS — Z13.31 DEPRESSION SCREENING NEGATIVE: ICD-10-CM

## 2022-07-27 PROCEDURE — 96160 PR PT FOCUSED HLTH RISK ASSMT: ICD-10-PCS | Mod: S$PBB,,, | Performed by: OBSTETRICS & GYNECOLOGY

## 2022-07-27 PROCEDURE — 99999 PR PBB SHADOW E&M-EST. PATIENT-LVL III: CPT | Mod: PBBFAC,,, | Performed by: OBSTETRICS & GYNECOLOGY

## 2022-07-27 PROCEDURE — 99999 PR PBB SHADOW E&M-EST. PATIENT-LVL III: ICD-10-PCS | Mod: PBBFAC,,, | Performed by: OBSTETRICS & GYNECOLOGY

## 2022-07-27 PROCEDURE — 59430 PR CARE AFTER DELIVERY ONLY: ICD-10-PCS | Mod: S$PBB,,, | Performed by: OBSTETRICS & GYNECOLOGY

## 2022-07-27 PROCEDURE — 99213 OFFICE O/P EST LOW 20 MIN: CPT | Mod: PBBFAC | Performed by: OBSTETRICS & GYNECOLOGY

## 2022-07-27 PROCEDURE — 96160 PT-FOCUSED HLTH RISK ASSMT: CPT | Mod: S$PBB,,, | Performed by: OBSTETRICS & GYNECOLOGY

## 2022-07-27 NOTE — PROGRESS NOTES
CC: Post-partum follow-up    Aye Caceres is a 22 y.o. female  presents for a postpartum visit.  She is status post  CD 1 weeks ago.  Her hospitalization was not complicated.  She is breastfeeding.  She desires Depo-Provera for contraception.  She denies postpartum depression; [place]  depression screening reviewed and 6. She and the baby are doing well.  No pain.  No fever.   No bowel / bladder complaints. Pregnancy was complicated by: low lying placenta        Her last pap was No result found      ROS:  GENERAL: No fever, chills, fatigability.  VULVAR: No pain, no lesions and no itching.  VAGINAL: No relaxation, no itching, no discharge, no abnormal bleeding and no lesions.  ABDOMEN: No abdominal pain. Denies nausea. Denies vomiting. No diarrhea. No constipation  BREAST: Denies pain. No lumps. No discharge.  URINARY: No incontinence, no nocturia, no frequency and no dysuria.  CARDIOVASCULAR: No chest pain. No shortness of breath. No leg cramps.  NEUROLOGICAL: No headaches. No vision changes.    Past Medical History:   Diagnosis Date    Asthma      Past Surgical History:   Procedure Laterality Date     SECTION       SECTION N/A 2022    Procedure: REPEAT  SECTION;  Surgeon: Jan Clay MD;  Location: [facility];  Service: OB/GYN;  Laterality: N/A;     Review of patient's allergies indicates:  No Known Allergies    Current Outpatient Medications:     albuterol 2.5 mg /3 mL (0.083 %) Nebu 3 mL, albuterol 5 mg/mL Nebu 0.5 mL, Inhale into the lungs., Disp: , Rfl:     docusate sodium (DULCOLAX STOOL SOFTENER, DSS, ORAL), Take by mouth., Disp: , Rfl:     fluticasone propionate (FLONASE) 50 mcg/actuation nasal spray, 1 spray (50 mcg total) by Each Nostril route once daily., Disp: 16 g, Rfl: 3    fluticasone propionate (FLONASE) 50 mcg/actuation nasal spray, 1 spray (50 mcg total) by Each Nostril route once daily., Disp: 9.9 mL, Rfl: 0    ibuprofen  (ADVIL,MOTRIN) 800 MG tablet, Take 1 tablet (800 mg total) by mouth every 8 (eight) hours., Disp: 30 tablet, Rfl: 0    montelukast (SINGULAIR) 10 mg tablet, Take 1 tablet (10 mg total) by mouth every evening., Disp: 30 tablet, Rfl: 5    ondansetron (ZOFRAN) 4 MG tablet, Take 4 mg by mouth every 8 (eight) hours as needed., Disp: , Rfl:     ondansetron (ZOFRAN-ODT) 8 MG TbDL, Take 1 tablet (8 mg total) by mouth 3 (three) times daily as needed., Disp: 30 tablet, Rfl: 0    PNV62-FA-om3-dha-epa-fish oil (PRENATAL GUMMY) 400 mcg-35 mg -25 mg-5 mg Chew, Take by mouth., Disp: , Rfl:     sertraline (ZOLOFT) 100 MG tablet, Take 0.5 tablets (50 mg total) by mouth once daily., Disp: 30 tablet, Rfl: 4    HYDROcodone-acetaminophen (NORCO) 5-325 mg per tablet, Take 1 tablet by mouth every 6 (six) hours as needed for Pain. (Patient not taking: Reported on 2022), Disp: 20 tablet, Rfl: 0    loratadine (CLARITIN) 10 mg tablet, Take 1 tablet (10 mg total) by mouth once daily., Disp: 30 tablet, Rfl: 5    omeprazole (PRILOSEC) 40 MG capsule, Take 1 capsule (40 mg total) by mouth every morning. for 30 doses, Disp: 30 capsule, Rfl: 5      Vitals:    22 1405   BP: 124/84   Pulse: 87   Resp: 14         PE: aquacel dressing removed; c/d/i    Assessment:    1. S/P  section     2. Visit for wound check     3. Depression screening negative             Plan:    1. RTC in 5 weeks  2. Instructions / precautions reviewed  3. Contraceptive counseling; Desires depo

## 2022-08-22 ENCOUNTER — TELEPHONE (OUTPATIENT)
Dept: OBSTETRICS AND GYNECOLOGY | Facility: CLINIC | Age: 22
End: 2022-08-22
Payer: MEDICAID

## 2022-08-22 NOTE — LETTER
August 22, 2022    Aye Caceres  [address]  [place] [address]         [facility]  [address] [place] [address]  Phone: [phone] August 22, 2022     Patient: Aye Caceres   YOB: 2000   Date of Visit: 8/22/2022       To Whom It May Concern:    It is my medical opinion that Aye Caceres may return to full duty immediately with no restrictions.    If you have any questions or concerns, please don't hesitate to call.    Sincerely,        Jan Clay MD

## 2022-08-22 NOTE — TELEPHONE ENCOUNTER
Pt was called and informed of letter available in chart. Pt desires to print it out from her harman. Pt voiced understanding.

## 2022-08-22 NOTE — TELEPHONE ENCOUNTER
----- Message from Trice Edmonds sent at 8/22/2022 10:52 AM CDT -----  Pt needs a letter stating she is released to return to work with no restrictions. Letter has been approved by Dr. Clay.

## 2022-08-22 NOTE — TELEPHONE ENCOUNTER
----- Message from Trice Edmonds sent at 8/22/2022  7:08 AM CDT -----  Pt is 5 wk pp c/s and would like to return to work before her 6 wk visit. Pt needs a letter clearing her for work and also stating any restrictions.

## 2022-08-31 ENCOUNTER — POSTPARTUM VISIT (OUTPATIENT)
Dept: OBSTETRICS AND GYNECOLOGY | Facility: CLINIC | Age: 22
End: 2022-08-31
Payer: MEDICAID

## 2022-08-31 VITALS
HEIGHT: 65 IN | WEIGHT: 200 LBS | BODY MASS INDEX: 33.32 KG/M2 | HEART RATE: 109 BPM | SYSTOLIC BLOOD PRESSURE: 134 MMHG | DIASTOLIC BLOOD PRESSURE: 82 MMHG

## 2022-08-31 DIAGNOSIS — F41.9 ANXIETY DISORDER AFFECTING PREGNANCY, ANTEPARTUM: ICD-10-CM

## 2022-08-31 DIAGNOSIS — Z30.42 ENCOUNTER FOR DEPO-PROVERA CONTRACEPTION: ICD-10-CM

## 2022-08-31 DIAGNOSIS — Z13.32 ENCOUNTER FOR SCREENING FOR MATERNAL DEPRESSION: ICD-10-CM

## 2022-08-31 DIAGNOSIS — O99.340 ANXIETY DISORDER AFFECTING PREGNANCY, ANTEPARTUM: ICD-10-CM

## 2022-08-31 PROBLEM — O44.40 LOW-LYING PLACENTA: Status: RESOLVED | Noted: 2022-06-21 | Resolved: 2022-08-31

## 2022-08-31 LAB
B-HCG UR QL: NEGATIVE
CTP QC/QA: YES

## 2022-08-31 PROCEDURE — 0503F POSTPARTUM CARE VISIT: CPT | Mod: CPTII,,, | Performed by: OBSTETRICS & GYNECOLOGY

## 2022-08-31 PROCEDURE — 99999 PR PBB SHADOW E&M-EST. PATIENT-LVL III: CPT | Mod: PBBFAC,,, | Performed by: OBSTETRICS & GYNECOLOGY

## 2022-08-31 PROCEDURE — 81025 URINE PREGNANCY TEST: CPT | Mod: PBBFAC | Performed by: OBSTETRICS & GYNECOLOGY

## 2022-08-31 PROCEDURE — 96160 PT-FOCUSED HLTH RISK ASSMT: CPT | Mod: S$PBB,,, | Performed by: OBSTETRICS & GYNECOLOGY

## 2022-08-31 PROCEDURE — 96160 PR PT FOCUSED HLTH RISK ASSMT: ICD-10-PCS | Mod: S$PBB,,, | Performed by: OBSTETRICS & GYNECOLOGY

## 2022-08-31 PROCEDURE — 96160 PT-FOCUSED HLTH RISK ASSMT: CPT | Mod: PBBFAC | Performed by: OBSTETRICS & GYNECOLOGY

## 2022-08-31 PROCEDURE — 99213 OFFICE O/P EST LOW 20 MIN: CPT | Mod: PBBFAC,TH | Performed by: OBSTETRICS & GYNECOLOGY

## 2022-08-31 PROCEDURE — 0503F PR POSTPARTUM CARE VISIT: ICD-10-PCS | Mod: CPTII,,, | Performed by: OBSTETRICS & GYNECOLOGY

## 2022-08-31 PROCEDURE — 99999 PR PBB SHADOW E&M-EST. PATIENT-LVL III: ICD-10-PCS | Mod: PBBFAC,,, | Performed by: OBSTETRICS & GYNECOLOGY

## 2022-08-31 RX ORDER — MEDROXYPROGESTERONE ACETATE 150 MG/ML
150 INJECTION, SUSPENSION INTRAMUSCULAR
Qty: 1 ML | Refills: 3 | Status: SHIPPED | OUTPATIENT
Start: 2022-08-31 | End: 2023-05-04 | Stop reason: SDUPTHER

## 2022-08-31 RX ORDER — SERTRALINE HYDROCHLORIDE 50 MG/1
50 TABLET, FILM COATED ORAL DAILY
Qty: 30 TABLET | Refills: 3 | Status: SHIPPED | OUTPATIENT
Start: 2022-08-31 | End: 2023-05-02

## 2022-08-31 NOTE — PROGRESS NOTES
Medroxyprogesterone 150 mg/mL Injection:    Administered 8/31/22 @ 1:00PM in the UPPER RIGHT BUTTOCK    LOT: [id number]  EXP: 4/24    Next window: 11/16/22 - 11/30/22      **UPT NEGATIVE Today (8/31/22 12:53P)**          BY Dee Crow, MoneD

## 2022-08-31 NOTE — PROGRESS NOTES
CC: Post-partum follow-up    Aye Caceres is a 22 y.o. female  presents for a postpartum visit.  She is status post   RLTCS  6 weeks ago.  Her hospitalization was not complicated.  She is not breastfeeding.  She desires Depo-Provera for contraception.  She denies postpartum depression. [place] depression scale score 6. She and the baby are doing well.  No pain.  No fever.   No bowel / bladder complaints.     Her last pap was No result found      ROS:  GENERAL: No fever, chills, fatigability.  VULVAR: No pain, no lesions and no itching.  VAGINAL: No relaxation, no itching, no discharge, no abnormal bleeding and no lesions.  ABDOMEN: No abdominal pain. Denies nausea. Denies vomiting. No diarrhea. No constipation  BREAST: Denies pain. No lumps. No discharge.  URINARY: No incontinence, no nocturia, no frequency and no dysuria.  CARDIOVASCULAR: No chest pain. No shortness of breath. No leg cramps.  NEUROLOGICAL: No headaches. No vision changes.    Past Medical History:   Diagnosis Date    Asthma      Past Surgical History:   Procedure Laterality Date     SECTION       SECTION N/A 2022    Procedure: REPEAT  SECTION;  Surgeon: Jan Clay MD;  Location: [facility];  Service: OB/GYN;  Laterality: N/A;     Review of patient's allergies indicates:  No Known Allergies    Current Outpatient Medications:     albuterol 2.5 mg /3 mL (0.083 %) Nebu 3 mL, albuterol 5 mg/mL Nebu 0.5 mL, Inhale into the lungs., Disp: , Rfl:     fluticasone propionate (FLONASE) 50 mcg/actuation nasal spray, 1 spray (50 mcg total) by Each Nostril route once daily., Disp: 16 g, Rfl: 3    montelukast (SINGULAIR) 10 mg tablet, Take 1 tablet (10 mg total) by mouth every evening., Disp: 30 tablet, Rfl: 5    omeprazole (PRILOSEC) 40 MG capsule, Take 1 capsule (40 mg total) by mouth every morning. for 30 doses, Disp: 30 capsule, Rfl: 5    ondansetron (ZOFRAN) 4 MG tablet, Take 4 mg by mouth every 8 (eight)  hours as needed., Disp: , Rfl:     sertraline (ZOLOFT) 100 MG tablet, Take 0.5 tablets (50 mg total) by mouth once daily., Disp: 30 tablet, Rfl: 4    docusate sodium (DULCOLAX STOOL SOFTENER, DSS, ORAL), Take by mouth., Disp: , Rfl:     fluticasone propionate (FLONASE) 50 mcg/actuation nasal spray, 1 spray (50 mcg total) by Each Nostril route once daily., Disp: 9.9 mL, Rfl: 0    ibuprofen (ADVIL,MOTRIN) 800 MG tablet, Take 1 tablet (800 mg total) by mouth every 8 (eight) hours. (Patient not taking: Reported on 8/31/2022), Disp: 30 tablet, Rfl: 0    loratadine (CLARITIN) 10 mg tablet, Take 1 tablet (10 mg total) by mouth once daily., Disp: 30 tablet, Rfl: 5    ondansetron (ZOFRAN-ODT) 8 MG TbDL, Take 1 tablet (8 mg total) by mouth 3 (three) times daily as needed., Disp: 30 tablet, Rfl: 0    PNV62-FA-om3-dha-epa-fish oil (PRENATAL GUMMY) 400 mcg-35 mg -25 mg-5 mg Chew, Take by mouth., Disp: , Rfl:       There were no vitals filed for this visit.      Physical Exam  Vitals reviewed.   Constitutional:       General: She is not in acute distress.     Appearance: She is well-developed.   HENT:      Head: Normocephalic and atraumatic.   Eyes:      Conjunctiva/sclera: Conjunctivae normal.   Pulmonary:      Effort: Pulmonary effort is normal.   Abdominal:      Tenderness: There is no abdominal tenderness. There is no guarding or rebound.      Comments: Incision: well healed   Musculoskeletal:         General: No tenderness.      Cervical back: Normal range of motion and neck supple.      Right lower leg: No edema.      Left lower leg: No edema.   Skin:     General: Skin is warm and dry.   Neurological:      Mental Status: She is alert and oriented to person, place, and time.   Psychiatric:         Behavior: Behavior normal.         Thought Content: Thought content normal.         Judgment: Judgment normal.         Assessment:    1. Normal postpartum exam  2. Doing well S/P  RLTCS      Plan:    1. Routine follow up.  2.  Instructions / precautions reviewed  3. Contraceptive counseling  4. May resume normal activities  5. Return: for annual exam or prn

## 2022-09-20 ENCOUNTER — PATIENT OUTREACH (OUTPATIENT)
Dept: ADMINISTRATIVE | Facility: HOSPITAL | Age: 22
End: 2022-09-20
Payer: MEDICAID

## 2022-10-03 ENCOUNTER — PATIENT MESSAGE (OUTPATIENT)
Dept: ADMINISTRATIVE | Facility: HOSPITAL | Age: 22
End: 2022-10-03
Payer: MEDICAID

## 2022-11-16 NOTE — PROGRESS NOTES
Medroxyprogesterone 150 mg / 1ml injection given in the left hip at 4:06 pm on 11/16/2022   LOT [id number] EXP 08/2024 next window is 02/01/2023 to 02/15/2023  Leslie Martinez PharmD

## 2022-11-22 ENCOUNTER — PATIENT MESSAGE (OUTPATIENT)
Dept: INTERNAL MEDICINE | Facility: CLINIC | Age: 22
End: 2022-11-22
Payer: MEDICAID

## 2022-12-06 ENCOUNTER — OFFICE VISIT (OUTPATIENT)
Dept: INTERNAL MEDICINE | Facility: CLINIC | Age: 22
End: 2022-12-06
Payer: MEDICAID

## 2022-12-06 VITALS
DIASTOLIC BLOOD PRESSURE: 76 MMHG | RESPIRATION RATE: 16 BRPM | HEIGHT: 65 IN | WEIGHT: 245.69 LBS | SYSTOLIC BLOOD PRESSURE: 110 MMHG | BODY MASS INDEX: 40.93 KG/M2 | HEART RATE: 72 BPM

## 2022-12-06 DIAGNOSIS — E66.01 MORBID OBESITY WITH BMI OF 40.0-44.9, ADULT: Primary | ICD-10-CM

## 2022-12-06 PROCEDURE — 3008F PR BODY MASS INDEX (BMI) DOCUMENTED: ICD-10-PCS | Mod: CPTII,,, | Performed by: NURSE PRACTITIONER

## 2022-12-06 PROCEDURE — 1159F MED LIST DOCD IN RCRD: CPT | Mod: CPTII,,, | Performed by: NURSE PRACTITIONER

## 2022-12-06 PROCEDURE — 3008F BODY MASS INDEX DOCD: CPT | Mod: CPTII,,, | Performed by: NURSE PRACTITIONER

## 2022-12-06 PROCEDURE — 1159F PR MEDICATION LIST DOCUMENTED IN MEDICAL RECORD: ICD-10-PCS | Mod: CPTII,,, | Performed by: NURSE PRACTITIONER

## 2022-12-06 PROCEDURE — 3074F PR MOST RECENT SYSTOLIC BLOOD PRESSURE < 130 MM HG: ICD-10-PCS | Mod: CPTII,,, | Performed by: NURSE PRACTITIONER

## 2022-12-06 PROCEDURE — 99999 PR PBB SHADOW E&M-EST. PATIENT-LVL IV: CPT | Mod: PBBFAC,,, | Performed by: NURSE PRACTITIONER

## 2022-12-06 PROCEDURE — 99214 OFFICE O/P EST MOD 30 MIN: CPT | Mod: PBBFAC,PN | Performed by: NURSE PRACTITIONER

## 2022-12-06 PROCEDURE — 3074F SYST BP LT 130 MM HG: CPT | Mod: CPTII,,, | Performed by: NURSE PRACTITIONER

## 2022-12-06 PROCEDURE — 99999 PR PBB SHADOW E&M-EST. PATIENT-LVL IV: ICD-10-PCS | Mod: PBBFAC,,, | Performed by: NURSE PRACTITIONER

## 2022-12-06 PROCEDURE — 99214 PR OFFICE/OUTPT VISIT, EST, LEVL IV, 30-39 MIN: ICD-10-PCS | Mod: S$PBB,,, | Performed by: NURSE PRACTITIONER

## 2022-12-06 PROCEDURE — 1160F PR REVIEW ALL MEDS BY PRESCRIBER/CLIN PHARMACIST DOCUMENTED: ICD-10-PCS | Mod: CPTII,,, | Performed by: NURSE PRACTITIONER

## 2022-12-06 PROCEDURE — 1160F RVW MEDS BY RX/DR IN RCRD: CPT | Mod: CPTII,,, | Performed by: NURSE PRACTITIONER

## 2022-12-06 PROCEDURE — 3078F PR MOST RECENT DIASTOLIC BLOOD PRESSURE < 80 MM HG: ICD-10-PCS | Mod: CPTII,,, | Performed by: NURSE PRACTITIONER

## 2022-12-06 PROCEDURE — 3078F DIAST BP <80 MM HG: CPT | Mod: CPTII,,, | Performed by: NURSE PRACTITIONER

## 2022-12-06 PROCEDURE — 99214 OFFICE O/P EST MOD 30 MIN: CPT | Mod: S$PBB,,, | Performed by: NURSE PRACTITIONER

## 2022-12-06 NOTE — PROGRESS NOTES
Subjective:       Patient ID: Aye Caceres is a 22 y.o. female.    Chief Complaint: Obesity (Patient here to discuss weight loss)    Patient is known, to me and presents with   Chief Complaint   Patient presents with    Obesity     Patient here to discuss weight loss   .  Denies chest pain and shortness of breath.  Patient presents with weight loss questions. She would like to be on medications to help with this.   HPI  Review of Systems   Constitutional:  Negative for activity change, appetite change, fatigue, fever and unexpected weight change.   HENT:  Negative for congestion, ear discharge, ear pain, hearing loss, postnasal drip and tinnitus.    Eyes:  Negative for photophobia, pain and visual disturbance.   Respiratory:  Negative for cough, shortness of breath, wheezing and stridor.    Cardiovascular:  Negative for chest pain, palpitations and leg swelling.   Gastrointestinal:  Negative for abdominal distention.   Genitourinary:  Negative for difficulty urinating, dysuria, frequency, hematuria and urgency.   Musculoskeletal:  Negative for arthralgias, back pain, gait problem, joint swelling and neck pain.   Skin: Negative.    Neurological:  Negative for dizziness, seizures, syncope, weakness, light-headedness, numbness and headaches.   Hematological:  Negative for adenopathy. Does not bruise/bleed easily.   Psychiatric/Behavioral:  Negative for behavioral problems, confusion, dysphoric mood, hallucinations, sleep disturbance and suicidal ideas. The patient is not nervous/anxious.      Objective:      Physical Exam  Constitutional:       General: She is not in acute distress.     Appearance: She is well-developed.   HENT:      Head: Normocephalic and atraumatic.      Right Ear: Tympanic membrane and external ear normal.      Left Ear: Tympanic membrane and external ear normal.      Nose: Nose normal.      Mouth/Throat:      Mouth: Mucous membranes are moist.      Pharynx: No oropharyngeal exudate.    Eyes:      General: No scleral icterus.        Right eye: No discharge.         Left eye: No discharge.      Conjunctiva/sclera: Conjunctivae normal.      Pupils: Pupils are equal, round, and reactive to light.   Neck:      Thyroid: No thyromegaly.      Vascular: No JVD.   Cardiovascular:      Rate and Rhythm: Normal rate and regular rhythm.      Heart sounds: Normal heart sounds. No murmur heard.    No friction rub. No gallop.   Pulmonary:      Effort: Pulmonary effort is normal. No respiratory distress.      Breath sounds: Normal breath sounds. No stridor. No wheezing or rales.   Chest:      Chest wall: No tenderness.   Abdominal:      General: Bowel sounds are normal. There is no distension.      Palpations: Abdomen is soft. There is no mass.      Tenderness: There is no abdominal tenderness. There is no right CVA tenderness, left CVA tenderness, guarding or rebound.      Hernia: No hernia is present.   Musculoskeletal:         General: No swelling, tenderness, deformity or signs of injury. Normal range of motion.      Cervical back: Normal range of motion and neck supple.      Right lower leg: No edema.      Left lower leg: No edema.   Lymphadenopathy:      Cervical: No cervical adenopathy.   Skin:     General: Skin is warm and dry.      Capillary Refill: Capillary refill takes less than 2 seconds.      Coloration: Skin is not jaundiced or pale.      Findings: No bruising, erythema, lesion or rash.   Neurological:      General: No focal deficit present.      Mental Status: She is alert and oriented to person, place, and time.      Cranial Nerves: No cranial nerve deficit.      Sensory: No sensory deficit.      Motor: No weakness or abnormal muscle tone.      Coordination: Coordination normal.      Gait: Gait normal.      Deep Tendon Reflexes: Reflexes are normal and symmetric. Reflexes normal.   Psychiatric:         Mood and Affect: Mood normal.         Behavior: Behavior normal.         Thought Content:  "Thought content normal.         Judgment: Judgment normal.      Comments: No sihi noted       Assessment:       1. Morbid obesity with BMI of 40.0-44.9, adult          Plan:   1. Morbid obesity with BMI of 40.0-44.9, adult  -     naltrexone-bupropion (CONTRAVE) 8-90 mg TbSR; Take 2 tablets by mouth 2 (two) times daily.  Dispense: 120 tablet; Refill: 0    "This note will not be shared with the patient."   Will try contrave   This is an adjunct to diet and exercise  I've explained to her that drugs of the SNRI/SSRI class can have side effects such as weight gain, sexual dysfunction, insomnia, headache, nausea. These medications are generally effective at alleviating symptoms of anxiety and/or depression. Let me know if significant side effects do occur.   If any thoughts of sihi will need to stop meds and call 911  Reviewed side effects  Rtc as scheduled     "

## 2022-12-20 ENCOUNTER — OFFICE VISIT (OUTPATIENT)
Dept: INTERNAL MEDICINE | Facility: CLINIC | Age: 22
End: 2022-12-20
Payer: MEDICAID

## 2022-12-20 VITALS
DIASTOLIC BLOOD PRESSURE: 60 MMHG | WEIGHT: 242.31 LBS | HEIGHT: 65 IN | OXYGEN SATURATION: 99 % | BODY MASS INDEX: 40.37 KG/M2 | SYSTOLIC BLOOD PRESSURE: 102 MMHG | HEART RATE: 83 BPM | RESPIRATION RATE: 20 BRPM

## 2022-12-20 DIAGNOSIS — E66.01 MORBID OBESITY WITH BMI OF 40.0-44.9, ADULT: Primary | ICD-10-CM

## 2022-12-20 PROCEDURE — 3074F SYST BP LT 130 MM HG: CPT | Mod: CPTII,,, | Performed by: NURSE PRACTITIONER

## 2022-12-20 PROCEDURE — 3008F PR BODY MASS INDEX (BMI) DOCUMENTED: ICD-10-PCS | Mod: CPTII,,, | Performed by: NURSE PRACTITIONER

## 2022-12-20 PROCEDURE — 1159F MED LIST DOCD IN RCRD: CPT | Mod: CPTII,,, | Performed by: NURSE PRACTITIONER

## 2022-12-20 PROCEDURE — 3078F PR MOST RECENT DIASTOLIC BLOOD PRESSURE < 80 MM HG: ICD-10-PCS | Mod: CPTII,,, | Performed by: NURSE PRACTITIONER

## 2022-12-20 PROCEDURE — 99214 PR OFFICE/OUTPT VISIT, EST, LEVL IV, 30-39 MIN: ICD-10-PCS | Mod: S$PBB,,, | Performed by: NURSE PRACTITIONER

## 2022-12-20 PROCEDURE — 99214 OFFICE O/P EST MOD 30 MIN: CPT | Mod: S$PBB,,, | Performed by: NURSE PRACTITIONER

## 2022-12-20 PROCEDURE — 3074F PR MOST RECENT SYSTOLIC BLOOD PRESSURE < 130 MM HG: ICD-10-PCS | Mod: CPTII,,, | Performed by: NURSE PRACTITIONER

## 2022-12-20 PROCEDURE — 3078F DIAST BP <80 MM HG: CPT | Mod: CPTII,,, | Performed by: NURSE PRACTITIONER

## 2022-12-20 PROCEDURE — 99999 PR PBB SHADOW E&M-EST. PATIENT-LVL IV: CPT | Mod: PBBFAC,,, | Performed by: NURSE PRACTITIONER

## 2022-12-20 PROCEDURE — 99999 PR PBB SHADOW E&M-EST. PATIENT-LVL IV: ICD-10-PCS | Mod: PBBFAC,,, | Performed by: NURSE PRACTITIONER

## 2022-12-20 PROCEDURE — 1159F PR MEDICATION LIST DOCUMENTED IN MEDICAL RECORD: ICD-10-PCS | Mod: CPTII,,, | Performed by: NURSE PRACTITIONER

## 2022-12-20 PROCEDURE — 99214 OFFICE O/P EST MOD 30 MIN: CPT | Mod: PBBFAC,PN | Performed by: NURSE PRACTITIONER

## 2022-12-20 PROCEDURE — 3008F BODY MASS INDEX DOCD: CPT | Mod: CPTII,,, | Performed by: NURSE PRACTITIONER

## 2022-12-20 NOTE — PROGRESS NOTES
Subjective:       Patient ID: Aye Caceres is a 22 y.o. female.    Chief Complaint: Follow-up (Pt here for 2 wk f/u. Pt states no current concerns.)    Patient is known, to me and presents with   Chief Complaint   Patient presents with    Follow-up     Pt here for 2 wk f/u. Pt states no current concerns.   .  Denies chest pain and shortness of breath.  Patient presents for follow up contrave and is doing great. Already lost 3 pounds. No sihi noted and her mood is stable.  Taking the contrave 1 in am and 1 in pm   Follow-up  Pertinent negatives include no arthralgias, chest pain, congestion, coughing, fatigue, fever, headaches, joint swelling, neck pain, numbness or weakness.   Review of Systems   Constitutional:  Negative for activity change, appetite change, fatigue, fever and unexpected weight change.   HENT:  Negative for congestion, ear discharge, ear pain, hearing loss, postnasal drip and tinnitus.    Eyes:  Negative for photophobia, pain and visual disturbance.   Respiratory:  Negative for cough, shortness of breath, wheezing and stridor.    Cardiovascular:  Negative for chest pain, palpitations and leg swelling.   Gastrointestinal:  Negative for abdominal distention.   Genitourinary:  Negative for difficulty urinating, dysuria, frequency, hematuria and urgency.   Musculoskeletal:  Negative for arthralgias, back pain, gait problem, joint swelling and neck pain.   Skin: Negative.    Neurological:  Negative for dizziness, seizures, syncope, weakness, light-headedness, numbness and headaches.   Hematological:  Negative for adenopathy. Does not bruise/bleed easily.   Psychiatric/Behavioral:  Negative for behavioral problems, confusion, dysphoric mood, hallucinations, sleep disturbance and suicidal ideas. The patient is not nervous/anxious.      Objective:      Physical Exam  Constitutional:       General: She is not in acute distress.     Appearance: She is well-developed.   HENT:      Head: Normocephalic  and atraumatic.      Right Ear: Tympanic membrane and external ear normal.      Left Ear: Tympanic membrane and external ear normal.      Nose: Nose normal.      Mouth/Throat:      Mouth: Mucous membranes are moist.      Pharynx: No oropharyngeal exudate.   Eyes:      General: No scleral icterus.        Right eye: No discharge.         Left eye: No discharge.      Conjunctiva/sclera: Conjunctivae normal.      Pupils: Pupils are equal, round, and reactive to light.   Neck:      Thyroid: No thyromegaly.      Vascular: No JVD.   Cardiovascular:      Rate and Rhythm: Normal rate and regular rhythm.      Heart sounds: Normal heart sounds. No murmur heard.    No friction rub. No gallop.   Pulmonary:      Effort: Pulmonary effort is normal. No respiratory distress.      Breath sounds: Normal breath sounds. No stridor. No wheezing or rales.   Chest:      Chest wall: No tenderness.   Abdominal:      General: Bowel sounds are normal. There is no distension.      Palpations: Abdomen is soft. There is no mass.      Tenderness: There is no abdominal tenderness. There is no right CVA tenderness, left CVA tenderness, guarding or rebound.      Hernia: No hernia is present.   Musculoskeletal:         General: No swelling, tenderness, deformity or signs of injury. Normal range of motion.      Cervical back: Normal range of motion and neck supple.      Right lower leg: No edema.      Left lower leg: No edema.   Lymphadenopathy:      Cervical: No cervical adenopathy.   Skin:     General: Skin is warm and dry.      Capillary Refill: Capillary refill takes less than 2 seconds.      Coloration: Skin is not jaundiced or pale.      Findings: No bruising, erythema, lesion or rash.   Neurological:      General: No focal deficit present.      Mental Status: She is alert and oriented to person, place, and time.      Cranial Nerves: No cranial nerve deficit.      Sensory: No sensory deficit.      Motor: No weakness or abnormal muscle tone.       "Coordination: Coordination normal.      Gait: Gait normal.      Deep Tendon Reflexes: Reflexes are normal and symmetric. Reflexes normal.   Psychiatric:         Mood and Affect: Mood normal.         Behavior: Behavior normal.         Thought Content: Thought content normal.         Judgment: Judgment normal.       Assessment:       1. Morbid obesity with BMI of 40.0-44.9, adult          Plan:   1. Morbid obesity with BMI of 40.0-44.9, adult     "This note will not be shared with the patient."  Continue with contrave for now  Rtc in one month   "

## 2022-12-26 ENCOUNTER — PATIENT MESSAGE (OUTPATIENT)
Dept: INTERNAL MEDICINE | Facility: CLINIC | Age: 22
End: 2022-12-26
Payer: MEDICAID

## 2023-01-03 ENCOUNTER — PATIENT MESSAGE (OUTPATIENT)
Dept: INTERNAL MEDICINE | Facility: CLINIC | Age: 23
End: 2023-01-03
Payer: MEDICAID

## 2023-01-03 DIAGNOSIS — E66.01 MORBID OBESITY WITH BMI OF 40.0-44.9, ADULT: ICD-10-CM

## 2023-01-05 ENCOUNTER — PATIENT MESSAGE (OUTPATIENT)
Dept: INTERNAL MEDICINE | Facility: CLINIC | Age: 23
End: 2023-01-05
Payer: MEDICAID

## 2023-01-17 ENCOUNTER — OFFICE VISIT (OUTPATIENT)
Dept: INTERNAL MEDICINE | Facility: CLINIC | Age: 23
End: 2023-01-17
Payer: MEDICAID

## 2023-01-17 VITALS
BODY MASS INDEX: 40.15 KG/M2 | WEIGHT: 241 LBS | OXYGEN SATURATION: 100 % | HEART RATE: 65 BPM | RESPIRATION RATE: 18 BRPM | DIASTOLIC BLOOD PRESSURE: 84 MMHG | HEIGHT: 65 IN | SYSTOLIC BLOOD PRESSURE: 122 MMHG

## 2023-01-17 DIAGNOSIS — E66.01 MORBID OBESITY WITH BMI OF 40.0-44.9, ADULT: ICD-10-CM

## 2023-01-17 PROCEDURE — 1159F PR MEDICATION LIST DOCUMENTED IN MEDICAL RECORD: ICD-10-PCS | Mod: CPTII,,, | Performed by: NURSE PRACTITIONER

## 2023-01-17 PROCEDURE — 99214 PR OFFICE/OUTPT VISIT, EST, LEVL IV, 30-39 MIN: ICD-10-PCS | Mod: S$PBB,,, | Performed by: NURSE PRACTITIONER

## 2023-01-17 PROCEDURE — 1159F MED LIST DOCD IN RCRD: CPT | Mod: CPTII,,, | Performed by: NURSE PRACTITIONER

## 2023-01-17 PROCEDURE — 3074F SYST BP LT 130 MM HG: CPT | Mod: CPTII,,, | Performed by: NURSE PRACTITIONER

## 2023-01-17 PROCEDURE — 99999 PR PBB SHADOW E&M-EST. PATIENT-LVL IV: ICD-10-PCS | Mod: PBBFAC,,, | Performed by: NURSE PRACTITIONER

## 2023-01-17 PROCEDURE — 3008F PR BODY MASS INDEX (BMI) DOCUMENTED: ICD-10-PCS | Mod: CPTII,,, | Performed by: NURSE PRACTITIONER

## 2023-01-17 PROCEDURE — 3079F PR MOST RECENT DIASTOLIC BLOOD PRESSURE 80-89 MM HG: ICD-10-PCS | Mod: CPTII,,, | Performed by: NURSE PRACTITIONER

## 2023-01-17 PROCEDURE — 3008F BODY MASS INDEX DOCD: CPT | Mod: CPTII,,, | Performed by: NURSE PRACTITIONER

## 2023-01-17 PROCEDURE — 99214 OFFICE O/P EST MOD 30 MIN: CPT | Mod: S$PBB,,, | Performed by: NURSE PRACTITIONER

## 2023-01-17 PROCEDURE — 99214 OFFICE O/P EST MOD 30 MIN: CPT | Mod: PBBFAC,PN | Performed by: NURSE PRACTITIONER

## 2023-01-17 PROCEDURE — 3074F PR MOST RECENT SYSTOLIC BLOOD PRESSURE < 130 MM HG: ICD-10-PCS | Mod: CPTII,,, | Performed by: NURSE PRACTITIONER

## 2023-01-17 PROCEDURE — 3079F DIAST BP 80-89 MM HG: CPT | Mod: CPTII,,, | Performed by: NURSE PRACTITIONER

## 2023-01-17 PROCEDURE — 99999 PR PBB SHADOW E&M-EST. PATIENT-LVL IV: CPT | Mod: PBBFAC,,, | Performed by: NURSE PRACTITIONER

## 2023-01-17 NOTE — PROGRESS NOTES
Subjective:       Patient ID: yAe Caceres is a 22 y.o. female.    Chief Complaint: Follow-up (X 1 month )    Patient is known, to me and presents with   Chief Complaint   Patient presents with    Follow-up     X 1 month    .  Denies chest pain and shortness of breath.  Patient presents with one month follow up for contrave and is doing well. Has lost over 9 pounds so far. No sihi noted. And feels much better   Follow-up  Pertinent negatives include no arthralgias, chest pain, congestion, coughing, fatigue, fever, headaches, joint swelling, neck pain, numbness or weakness.   Review of Systems   Constitutional:  Negative for activity change, appetite change, fatigue, fever and unexpected weight change.   HENT:  Negative for congestion, ear discharge, ear pain, hearing loss, postnasal drip and tinnitus.    Eyes:  Negative for photophobia, pain and visual disturbance.   Respiratory:  Negative for cough, shortness of breath, wheezing and stridor.    Cardiovascular:  Negative for chest pain, palpitations and leg swelling.   Gastrointestinal:  Negative for abdominal distention.   Genitourinary:  Negative for difficulty urinating, dysuria, frequency, hematuria and urgency.   Musculoskeletal:  Negative for arthralgias, back pain, gait problem, joint swelling and neck pain.   Skin: Negative.    Neurological:  Negative for dizziness, seizures, syncope, weakness, light-headedness, numbness and headaches.   Hematological:  Negative for adenopathy. Does not bruise/bleed easily.   Psychiatric/Behavioral:  Negative for behavioral problems, confusion, dysphoric mood, hallucinations, sleep disturbance and suicidal ideas. The patient is not nervous/anxious.      Objective:      Physical Exam  Constitutional:       General: She is not in acute distress.     Appearance: She is well-developed.   HENT:      Head: Normocephalic and atraumatic.      Right Ear: Tympanic membrane and external ear normal.      Left Ear: Tympanic  membrane and external ear normal.      Nose: Nose normal.      Mouth/Throat:      Mouth: Mucous membranes are moist.      Pharynx: No oropharyngeal exudate.   Eyes:      General: No scleral icterus.        Right eye: No discharge.         Left eye: No discharge.      Conjunctiva/sclera: Conjunctivae normal.      Pupils: Pupils are equal, round, and reactive to light.   Neck:      Thyroid: No thyromegaly.      Vascular: No JVD.   Cardiovascular:      Rate and Rhythm: Normal rate and regular rhythm.      Heart sounds: Normal heart sounds. No murmur heard.    No friction rub. No gallop.   Pulmonary:      Effort: Pulmonary effort is normal. No respiratory distress.      Breath sounds: Normal breath sounds. No stridor. No wheezing or rales.   Chest:      Chest wall: No tenderness.   Abdominal:      General: Bowel sounds are normal. There is no distension.      Palpations: Abdomen is soft. There is no mass.      Tenderness: There is no abdominal tenderness. There is no right CVA tenderness, left CVA tenderness, guarding or rebound.      Hernia: No hernia is present.   Musculoskeletal:         General: No swelling, tenderness, deformity or signs of injury. Normal range of motion.      Cervical back: Normal range of motion and neck supple.      Right lower leg: No edema.      Left lower leg: No edema.   Lymphadenopathy:      Cervical: No cervical adenopathy.   Skin:     General: Skin is warm and dry.      Capillary Refill: Capillary refill takes less than 2 seconds.      Coloration: Skin is not jaundiced or pale.      Findings: No bruising, erythema, lesion or rash.   Neurological:      General: No focal deficit present.      Mental Status: She is alert and oriented to person, place, and time.      Cranial Nerves: No cranial nerve deficit.      Sensory: No sensory deficit.      Motor: No weakness or abnormal muscle tone.      Coordination: Coordination normal.      Gait: Gait normal.      Deep Tendon Reflexes: Reflexes are  "normal and symmetric. Reflexes normal.   Psychiatric:         Mood and Affect: Mood normal.         Behavior: Behavior normal.         Thought Content: Thought content normal.         Judgment: Judgment normal.      Comments: Negative sihi noted       Assessment:       1. Morbid obesity with BMI of 40.0-44.9, adult          Plan:   1. Morbid obesity with BMI of 40.0-44.9, adult  -     naltrexone-bupropion (CONTRAVE) 8-90 mg TbSR; Take 2 tablets by mouth 2 (two) times daily.  Dispense: 120 tablet; Refill: 2     "This note will not be shared with the patient."  Continue with medication regime  Rtc as scheduled   "

## 2023-02-02 NOTE — PROGRESS NOTES
Medroxyprogesterone 150mg/ml was administered today on (2/2/23).   The patient recieved the shot in their (R hip) at (4.47p)  Lot: [id number]  Exp: 10/24    Patient due to return for next one: apr 20- may 4/23  Administered by Radha Donovan PharmD.  [id number]  Exp: 12/31/2023

## 2023-02-05 ENCOUNTER — PATIENT MESSAGE (OUTPATIENT)
Dept: INTERNAL MEDICINE | Facility: CLINIC | Age: 23
End: 2023-02-05
Payer: MEDICAID

## 2023-02-05 DIAGNOSIS — E66.01 MORBID OBESITY WITH BMI OF 40.0-44.9, ADULT: ICD-10-CM

## 2023-02-07 ENCOUNTER — PATIENT MESSAGE (OUTPATIENT)
Dept: INTERNAL MEDICINE | Facility: CLINIC | Age: 23
End: 2023-02-07
Payer: MEDICAID

## 2023-02-17 ENCOUNTER — PATIENT OUTREACH (OUTPATIENT)
Dept: ADMINISTRATIVE | Facility: HOSPITAL | Age: 23
End: 2023-02-17
Payer: MEDICAID

## 2023-02-17 NOTE — PROGRESS NOTES
Chart reviewed, immunization record updated.  No new results noted on [facility] or [facility] web site.  Care Everywhere updated.   Patient care coordination note updated.  LOV with PCP 01/17/2023.

## 2023-03-27 ENCOUNTER — PATIENT MESSAGE (OUTPATIENT)
Dept: INTERNAL MEDICINE | Facility: CLINIC | Age: 23
End: 2023-03-27
Payer: MEDICAID

## 2023-04-03 ENCOUNTER — OFFICE VISIT (OUTPATIENT)
Dept: INTERNAL MEDICINE | Facility: CLINIC | Age: 23
End: 2023-04-03
Payer: MEDICAID

## 2023-04-03 VITALS
WEIGHT: 240.31 LBS | BODY MASS INDEX: 40.04 KG/M2 | OXYGEN SATURATION: 98 % | RESPIRATION RATE: 18 BRPM | HEART RATE: 76 BPM | SYSTOLIC BLOOD PRESSURE: 120 MMHG | DIASTOLIC BLOOD PRESSURE: 88 MMHG | HEIGHT: 65 IN

## 2023-04-03 DIAGNOSIS — K82.8 BILIARY DYSKINESIA: Primary | ICD-10-CM

## 2023-04-03 PROCEDURE — 1159F MED LIST DOCD IN RCRD: CPT | Mod: CPTII,,, | Performed by: NURSE PRACTITIONER

## 2023-04-03 PROCEDURE — 3008F BODY MASS INDEX DOCD: CPT | Mod: CPTII,,, | Performed by: NURSE PRACTITIONER

## 2023-04-03 PROCEDURE — 99999 PR PBB SHADOW E&M-EST. PATIENT-LVL IV: CPT | Mod: PBBFAC,,, | Performed by: NURSE PRACTITIONER

## 2023-04-03 PROCEDURE — 3079F PR MOST RECENT DIASTOLIC BLOOD PRESSURE 80-89 MM HG: ICD-10-PCS | Mod: CPTII,,, | Performed by: NURSE PRACTITIONER

## 2023-04-03 PROCEDURE — 99214 OFFICE O/P EST MOD 30 MIN: CPT | Mod: PBBFAC,PN | Performed by: NURSE PRACTITIONER

## 2023-04-03 PROCEDURE — 3008F PR BODY MASS INDEX (BMI) DOCUMENTED: ICD-10-PCS | Mod: CPTII,,, | Performed by: NURSE PRACTITIONER

## 2023-04-03 PROCEDURE — 3079F DIAST BP 80-89 MM HG: CPT | Mod: CPTII,,, | Performed by: NURSE PRACTITIONER

## 2023-04-03 PROCEDURE — 3074F PR MOST RECENT SYSTOLIC BLOOD PRESSURE < 130 MM HG: ICD-10-PCS | Mod: CPTII,,, | Performed by: NURSE PRACTITIONER

## 2023-04-03 PROCEDURE — 99999 PR PBB SHADOW E&M-EST. PATIENT-LVL IV: ICD-10-PCS | Mod: PBBFAC,,, | Performed by: NURSE PRACTITIONER

## 2023-04-03 PROCEDURE — 99214 PR OFFICE/OUTPT VISIT, EST, LEVL IV, 30-39 MIN: ICD-10-PCS | Mod: S$PBB,,, | Performed by: NURSE PRACTITIONER

## 2023-04-03 PROCEDURE — 1159F PR MEDICATION LIST DOCUMENTED IN MEDICAL RECORD: ICD-10-PCS | Mod: CPTII,,, | Performed by: NURSE PRACTITIONER

## 2023-04-03 PROCEDURE — 99214 OFFICE O/P EST MOD 30 MIN: CPT | Mod: S$PBB,,, | Performed by: NURSE PRACTITIONER

## 2023-04-03 PROCEDURE — 3074F SYST BP LT 130 MM HG: CPT | Mod: CPTII,,, | Performed by: NURSE PRACTITIONER

## 2023-04-03 RX ORDER — ERGOCALCIFEROL 1.25 MG/1
50000 CAPSULE ORAL
COMMUNITY
Start: 2023-03-09 | End: 2024-03-08

## 2023-04-03 NOTE — PROGRESS NOTES
Subjective:       Patient ID: Aye Caceres is a 22 y.o. female.    Chief Complaint: Abdominal Pain    Patient is known, to me and presents with   Chief Complaint   Patient presents with    Abdominal Pain   .  Denies chest pain and shortness of breath.  In 2020 patient had HIDA scan done and had 5.7 % of gallbladder working. Surgery referral was sent but patient states that she was never told. She is having major issues with right upper quadrant pain and nausea. No vomiting.   Abdominal Pain  Associated symptoms include nausea. Pertinent negatives include no arthralgias, constipation, diarrhea, dysuria, fever, frequency, headaches, hematuria or vomiting.   Review of Systems   Constitutional:  Negative for activity change, appetite change, fatigue, fever and unexpected weight change.   HENT:  Negative for congestion, ear discharge, ear pain, hearing loss, postnasal drip and tinnitus.    Eyes:  Negative for photophobia, pain and visual disturbance.   Respiratory:  Negative for cough, shortness of breath, wheezing and stridor.    Cardiovascular:  Negative for chest pain, palpitations and leg swelling.   Gastrointestinal:  Positive for abdominal pain and nausea. Negative for abdominal distention, anal bleeding, blood in stool, constipation, diarrhea, rectal pain and vomiting.   Genitourinary:  Negative for difficulty urinating, dysuria, frequency, hematuria and urgency.   Musculoskeletal:  Negative for arthralgias, back pain, gait problem, joint swelling and neck pain.   Skin: Negative.    Neurological:  Negative for dizziness, seizures, syncope, weakness, light-headedness, numbness and headaches.   Hematological:  Negative for adenopathy. Does not bruise/bleed easily.   Psychiatric/Behavioral:  Negative for behavioral problems, confusion, dysphoric mood, hallucinations, sleep disturbance and suicidal ideas. The patient is not nervous/anxious.      Objective:      Physical Exam  Constitutional:       General: She  is not in acute distress.     Appearance: She is well-developed.   HENT:      Head: Normocephalic and atraumatic.      Right Ear: Tympanic membrane and external ear normal.      Left Ear: Tympanic membrane and external ear normal.      Nose: Nose normal.      Mouth/Throat:      Mouth: Mucous membranes are moist.      Pharynx: No oropharyngeal exudate.   Eyes:      General: No scleral icterus.        Right eye: No discharge.         Left eye: No discharge.      Conjunctiva/sclera: Conjunctivae normal.      Pupils: Pupils are equal, round, and reactive to light.   Neck:      Thyroid: No thyromegaly.      Vascular: No JVD.   Cardiovascular:      Rate and Rhythm: Normal rate and regular rhythm.      Heart sounds: Normal heart sounds. No murmur heard.    No friction rub. No gallop.   Pulmonary:      Effort: Pulmonary effort is normal. No respiratory distress.      Breath sounds: Normal breath sounds. No stridor. No wheezing or rales.   Chest:      Chest wall: No tenderness.   Abdominal:      General: Bowel sounds are normal. There is no distension.      Palpations: Abdomen is soft. There is no mass.      Tenderness: There is abdominal tenderness in the right upper quadrant. There is no right CVA tenderness, left CVA tenderness, guarding or rebound.      Hernia: No hernia is present.       Musculoskeletal:         General: No swelling, tenderness, deformity or signs of injury. Normal range of motion.      Cervical back: Normal range of motion and neck supple.      Right lower leg: No edema.      Left lower leg: No edema.   Lymphadenopathy:      Cervical: No cervical adenopathy.   Skin:     General: Skin is warm and dry.      Capillary Refill: Capillary refill takes less than 2 seconds.      Coloration: Skin is not jaundiced or pale.      Findings: No bruising, erythema, lesion or rash.   Neurological:      General: No focal deficit present.      Mental Status: She is alert and oriented to person, place, and time.       "Cranial Nerves: No cranial nerve deficit.      Sensory: No sensory deficit.      Motor: No weakness or abnormal muscle tone.      Coordination: Coordination normal.      Gait: Gait normal.      Deep Tendon Reflexes: Reflexes are normal and symmetric. Reflexes normal.   Psychiatric:         Attention and Perception: Attention and perception normal.         Mood and Affect: Mood and affect normal.         Speech: Speech normal.         Behavior: Behavior normal. Behavior is cooperative.         Thought Content: Thought content normal. Thought content does not include homicidal or suicidal ideation. Thought content does not include homicidal or suicidal plan.         Cognition and Memory: Cognition and memory normal.         Judgment: Judgment normal.       Assessment:       1. Biliary dyskinesia        Plan:   1. Biliary dyskinesia  -     Ambulatory referral/consult to General Surgery; Future; Expected date: 04/10/2023     "This note will not be shared with the patient."  Will refer to sx   Explained to patient that I will give her the doctor's number and to set that up asap  She verbalized understanding   Rtc as scheduled   "

## 2023-04-20 NOTE — TELEPHONE ENCOUNTER
----- Message from Bello Guadarrama sent at 2019  8:39 AM CST -----  Contact: Mom - Miroslava Caceres  MRN: [id number]  : 2000  PCP: Tiffany Harding  Home Phone      [phone]  Work Phone      Not on file.  Mobile          [phone]      MESSAGE: sinus issues - allergies -- requesting appt today    Call Miroslava @  [phone]    PCP: Mehdi   

## 2023-05-02 ENCOUNTER — OFFICE VISIT (OUTPATIENT)
Dept: INTERNAL MEDICINE | Facility: CLINIC | Age: 23
End: 2023-05-02
Payer: MEDICAID

## 2023-05-02 VITALS
SYSTOLIC BLOOD PRESSURE: 124 MMHG | DIASTOLIC BLOOD PRESSURE: 86 MMHG | RESPIRATION RATE: 18 BRPM | HEART RATE: 80 BPM | HEIGHT: 65 IN | OXYGEN SATURATION: 98 % | WEIGHT: 243.5 LBS | BODY MASS INDEX: 40.57 KG/M2

## 2023-05-02 DIAGNOSIS — F33.0 MILD EPISODE OF RECURRENT MAJOR DEPRESSIVE DISORDER: ICD-10-CM

## 2023-05-02 DIAGNOSIS — F41.1 GAD (GENERALIZED ANXIETY DISORDER): Primary | ICD-10-CM

## 2023-05-02 PROCEDURE — 99214 OFFICE O/P EST MOD 30 MIN: CPT | Mod: PBBFAC,PN | Performed by: NURSE PRACTITIONER

## 2023-05-02 PROCEDURE — 3079F DIAST BP 80-89 MM HG: CPT | Mod: CPTII,,, | Performed by: NURSE PRACTITIONER

## 2023-05-02 PROCEDURE — 99999 PR PBB SHADOW E&M-EST. PATIENT-LVL IV: ICD-10-PCS | Mod: PBBFAC,,, | Performed by: NURSE PRACTITIONER

## 2023-05-02 PROCEDURE — 99999 PR PBB SHADOW E&M-EST. PATIENT-LVL IV: CPT | Mod: PBBFAC,,, | Performed by: NURSE PRACTITIONER

## 2023-05-02 PROCEDURE — 1159F PR MEDICATION LIST DOCUMENTED IN MEDICAL RECORD: ICD-10-PCS | Mod: CPTII,,, | Performed by: NURSE PRACTITIONER

## 2023-05-02 PROCEDURE — 99214 OFFICE O/P EST MOD 30 MIN: CPT | Mod: S$PBB,,, | Performed by: NURSE PRACTITIONER

## 2023-05-02 PROCEDURE — 99214 PR OFFICE/OUTPT VISIT, EST, LEVL IV, 30-39 MIN: ICD-10-PCS | Mod: S$PBB,,, | Performed by: NURSE PRACTITIONER

## 2023-05-02 PROCEDURE — 1159F MED LIST DOCD IN RCRD: CPT | Mod: CPTII,,, | Performed by: NURSE PRACTITIONER

## 2023-05-02 PROCEDURE — 3074F PR MOST RECENT SYSTOLIC BLOOD PRESSURE < 130 MM HG: ICD-10-PCS | Mod: CPTII,,, | Performed by: NURSE PRACTITIONER

## 2023-05-02 PROCEDURE — 3079F PR MOST RECENT DIASTOLIC BLOOD PRESSURE 80-89 MM HG: ICD-10-PCS | Mod: CPTII,,, | Performed by: NURSE PRACTITIONER

## 2023-05-02 PROCEDURE — 3008F BODY MASS INDEX DOCD: CPT | Mod: CPTII,,, | Performed by: NURSE PRACTITIONER

## 2023-05-02 PROCEDURE — 3008F PR BODY MASS INDEX (BMI) DOCUMENTED: ICD-10-PCS | Mod: CPTII,,, | Performed by: NURSE PRACTITIONER

## 2023-05-02 PROCEDURE — 3074F SYST BP LT 130 MM HG: CPT | Mod: CPTII,,, | Performed by: NURSE PRACTITIONER

## 2023-05-02 RX ORDER — SERTRALINE HYDROCHLORIDE 100 MG/1
100 TABLET, FILM COATED ORAL DAILY
Qty: 30 TABLET | Refills: 2 | Status: SHIPPED | OUTPATIENT
Start: 2023-05-02 | End: 2023-11-22

## 2023-05-02 NOTE — PROGRESS NOTES
Subjective:       Patient ID: Aye Caceres is a 22 y.o. female.    Chief Complaint: Anxiety (With depression- discuss meds due to no relief from current med she is taking )    Patient is known, to me and presents with   Chief Complaint   Patient presents with    Anxiety     With depression- discuss meds due to no relief from current med she is taking    .  Denies chest pain and shortness of breath.  Patient presents with wanting to go up on her zoloft dose. Finds that the 50 mg is not working like it used to. She is more irritable and feels aggravated a lot. No sihi noted. Mood is stable   Anxiety  Symptoms include nervous/anxious behavior. Patient reports no chest pain, confusion, dizziness, palpitations, shortness of breath or suicidal ideas.       Review of Systems   Constitutional:  Negative for activity change, appetite change, fatigue, fever and unexpected weight change.   HENT:  Negative for congestion, ear discharge, ear pain, hearing loss, postnasal drip and tinnitus.    Eyes:  Negative for photophobia, pain and visual disturbance.   Respiratory:  Negative for cough, shortness of breath, wheezing and stridor.    Cardiovascular:  Negative for chest pain, palpitations and leg swelling.   Gastrointestinal:  Negative for abdominal distention.   Genitourinary:  Negative for difficulty urinating, dysuria, frequency, hematuria and urgency.   Musculoskeletal:  Negative for arthralgias, back pain, gait problem, joint swelling and neck pain.   Skin: Negative.    Neurological:  Negative for dizziness, seizures, syncope, weakness, light-headedness, numbness and headaches.   Hematological:  Negative for adenopathy. Does not bruise/bleed easily.   Psychiatric/Behavioral:  Positive for dysphoric mood. Negative for behavioral problems, confusion, hallucinations, sleep disturbance and suicidal ideas. The patient is nervous/anxious.      Objective:      Physical Exam  Constitutional:       General: She is not in  acute distress.     Appearance: She is well-developed.   HENT:      Head: Normocephalic and atraumatic.      Right Ear: Tympanic membrane and external ear normal.      Left Ear: Tympanic membrane and external ear normal.      Nose: Nose normal.      Mouth/Throat:      Mouth: Mucous membranes are moist.      Pharynx: No oropharyngeal exudate.   Eyes:      General: No scleral icterus.        Right eye: No discharge.         Left eye: No discharge.      Conjunctiva/sclera: Conjunctivae normal.      Pupils: Pupils are equal, round, and reactive to light.   Neck:      Thyroid: No thyromegaly.      Vascular: No JVD.   Cardiovascular:      Rate and Rhythm: Normal rate and regular rhythm.      Heart sounds: Normal heart sounds. No murmur heard.    No friction rub. No gallop.   Pulmonary:      Effort: Pulmonary effort is normal. No respiratory distress.      Breath sounds: Normal breath sounds. No stridor. No wheezing or rales.   Chest:      Chest wall: No tenderness.   Abdominal:      General: Bowel sounds are normal. There is no distension.      Palpations: Abdomen is soft. There is no mass.      Tenderness: There is no abdominal tenderness. There is no right CVA tenderness, left CVA tenderness, guarding or rebound.      Hernia: No hernia is present.   Musculoskeletal:         General: No swelling, tenderness, deformity or signs of injury. Normal range of motion.      Cervical back: Normal range of motion and neck supple.      Right lower leg: No edema.      Left lower leg: No edema.   Lymphadenopathy:      Cervical: No cervical adenopathy.   Skin:     General: Skin is warm and dry.      Capillary Refill: Capillary refill takes less than 2 seconds.      Coloration: Skin is not jaundiced or pale.      Findings: No bruising, erythema, lesion or rash.   Neurological:      General: No focal deficit present.      Mental Status: She is alert and oriented to person, place, and time.      Cranial Nerves: No cranial nerve deficit.  "     Sensory: No sensory deficit.      Motor: No weakness or abnormal muscle tone.      Coordination: Coordination normal.      Gait: Gait normal.      Deep Tendon Reflexes: Reflexes are normal and symmetric. Reflexes normal.   Psychiatric:         Attention and Perception: Attention and perception normal.         Mood and Affect: Mood and affect normal.         Speech: Speech normal.         Behavior: Behavior normal. Behavior is cooperative.         Thought Content: Thought content normal. Thought content is not paranoid or delusional. Thought content does not include homicidal or suicidal ideation. Thought content does not include homicidal or suicidal plan.         Cognition and Memory: Cognition and memory normal.         Judgment: Judgment normal.       Assessment:       1. IVY (generalized anxiety disorder)    2. Mild episode of recurrent major depressive disorder        Plan:   1. IVY (generalized anxiety disorder)  -     sertraline (ZOLOFT) 100 MG tablet; Take 1 tablet (100 mg total) by mouth once daily.  Dispense: 30 tablet; Refill: 2    2. Mild episode of recurrent major depressive disorder  -     sertraline (ZOLOFT) 100 MG tablet; Take 1 tablet (100 mg total) by mouth once daily.  Dispense: 30 tablet; Refill: 2       "This note will not be shared with the patient."  I've explained to her that drugs of the SSRI class can have side effects such as weight gain, sexual dysfunction, insomnia, headache, nausea. These medications are generally effective at alleviating symptoms of anxiety and/or depression. Let me know if significant side effects do occur.   Rtc in two weeks  "

## 2023-05-04 DIAGNOSIS — Z30.42 ENCOUNTER FOR DEPO-PROVERA CONTRACEPTION: ICD-10-CM

## 2023-05-04 RX ORDER — MEDROXYPROGESTERONE ACETATE 150 MG/ML
150 INJECTION, SUSPENSION INTRAMUSCULAR
Qty: 1 ML | Refills: 0 | OUTPATIENT
Start: 2023-05-04 | End: 2023-07-20 | Stop reason: SDUPTHER

## 2023-05-04 NOTE — TELEPHONE ENCOUNTER
Pt requested refill of depo provera injection. Pt has annual scheduled with Dr. Clay on 8/1/23 and last day to receive depo is today per chart.

## 2023-05-08 NOTE — PROGRESS NOTES
Medroxyprogesterone 150 mg / 1ml injection given in the left hip at 12:00 pm on 05/04/2023   LOT [id number] EXP 01/2025 next window is July 20 /2023 to 08/03/2023

## 2023-07-20 DIAGNOSIS — Z30.42 ENCOUNTER FOR DEPO-PROVERA CONTRACEPTION: ICD-10-CM

## 2023-07-20 RX ORDER — MEDROXYPROGESTERONE ACETATE 150 MG/ML
150 INJECTION, SUSPENSION INTRAMUSCULAR
Qty: 1 ML | Refills: 0 | Status: SHIPPED | OUTPATIENT
Start: 2023-07-20 | End: 2023-08-30 | Stop reason: ALTCHOICE

## 2023-07-21 ENCOUNTER — DOCUMENTATION ONLY (OUTPATIENT)
Dept: PHARMACY | Facility: CLINIC | Age: 23
End: 2023-07-21
Payer: MEDICAID

## 2023-07-21 NOTE — PROGRESS NOTES
Patient received IM Depo Provera to the upper outer side of right buttock on 10/21/23   The patient was instructed to get the next injection between 10/06-10/20/23 .     Lot Number:   Expiration Date: 12/31/26  BY [place]

## 2023-08-13 ENCOUNTER — PATIENT MESSAGE (OUTPATIENT)
Dept: OBSTETRICS AND GYNECOLOGY | Facility: CLINIC | Age: 23
End: 2023-08-13
Payer: MEDICAID

## 2023-08-23 ENCOUNTER — PATIENT MESSAGE (OUTPATIENT)
Dept: INTERNAL MEDICINE | Facility: CLINIC | Age: 23
End: 2023-08-23
Payer: MEDICAID

## 2023-08-23 DIAGNOSIS — L73.9 FOLLICULITIS: Primary | ICD-10-CM

## 2023-08-23 RX ORDER — CEPHALEXIN 500 MG/1
500 CAPSULE ORAL EVERY 12 HOURS
Qty: 14 CAPSULE | Refills: 0 | Status: SHIPPED | OUTPATIENT
Start: 2023-08-23 | End: 2023-08-30

## 2023-08-30 ENCOUNTER — OFFICE VISIT (OUTPATIENT)
Dept: OBSTETRICS AND GYNECOLOGY | Facility: CLINIC | Age: 23
End: 2023-08-30
Payer: MEDICAID

## 2023-08-30 VITALS
DIASTOLIC BLOOD PRESSURE: 78 MMHG | SYSTOLIC BLOOD PRESSURE: 114 MMHG | HEART RATE: 75 BPM | BODY MASS INDEX: 39.92 KG/M2 | WEIGHT: 239.63 LBS | HEIGHT: 65 IN

## 2023-08-30 DIAGNOSIS — Z30.011 ENCOUNTER FOR INITIAL PRESCRIPTION OF CONTRACEPTIVE PILLS: ICD-10-CM

## 2023-08-30 DIAGNOSIS — Z71.3 WEIGHT LOSS COUNSELING, ENCOUNTER FOR: ICD-10-CM

## 2023-08-30 PROCEDURE — 3078F DIAST BP <80 MM HG: CPT | Mod: CPTII,,, | Performed by: OBSTETRICS & GYNECOLOGY

## 2023-08-30 PROCEDURE — 1159F MED LIST DOCD IN RCRD: CPT | Mod: CPTII,,, | Performed by: OBSTETRICS & GYNECOLOGY

## 2023-08-30 PROCEDURE — 99213 PR OFFICE/OUTPT VISIT, EST, LEVL III, 20-29 MIN: ICD-10-PCS | Mod: S$PBB,,, | Performed by: OBSTETRICS & GYNECOLOGY

## 2023-08-30 PROCEDURE — 1160F PR REVIEW ALL MEDS BY PRESCRIBER/CLIN PHARMACIST DOCUMENTED: ICD-10-PCS | Mod: CPTII,,, | Performed by: OBSTETRICS & GYNECOLOGY

## 2023-08-30 PROCEDURE — 1160F RVW MEDS BY RX/DR IN RCRD: CPT | Mod: CPTII,,, | Performed by: OBSTETRICS & GYNECOLOGY

## 2023-08-30 PROCEDURE — 1159F PR MEDICATION LIST DOCUMENTED IN MEDICAL RECORD: ICD-10-PCS | Mod: CPTII,,, | Performed by: OBSTETRICS & GYNECOLOGY

## 2023-08-30 PROCEDURE — 99999 PR PBB SHADOW E&M-EST. PATIENT-LVL III: CPT | Mod: PBBFAC,,, | Performed by: OBSTETRICS & GYNECOLOGY

## 2023-08-30 PROCEDURE — 3008F PR BODY MASS INDEX (BMI) DOCUMENTED: ICD-10-PCS | Mod: CPTII,,, | Performed by: OBSTETRICS & GYNECOLOGY

## 2023-08-30 PROCEDURE — 3008F BODY MASS INDEX DOCD: CPT | Mod: CPTII,,, | Performed by: OBSTETRICS & GYNECOLOGY

## 2023-08-30 PROCEDURE — 99213 OFFICE O/P EST LOW 20 MIN: CPT | Mod: PBBFAC | Performed by: OBSTETRICS & GYNECOLOGY

## 2023-08-30 PROCEDURE — 3074F SYST BP LT 130 MM HG: CPT | Mod: CPTII,,, | Performed by: OBSTETRICS & GYNECOLOGY

## 2023-08-30 PROCEDURE — 99999 PR PBB SHADOW E&M-EST. PATIENT-LVL III: ICD-10-PCS | Mod: PBBFAC,,, | Performed by: OBSTETRICS & GYNECOLOGY

## 2023-08-30 PROCEDURE — 3074F PR MOST RECENT SYSTOLIC BLOOD PRESSURE < 130 MM HG: ICD-10-PCS | Mod: CPTII,,, | Performed by: OBSTETRICS & GYNECOLOGY

## 2023-08-30 PROCEDURE — 99213 OFFICE O/P EST LOW 20 MIN: CPT | Mod: S$PBB,,, | Performed by: OBSTETRICS & GYNECOLOGY

## 2023-08-30 PROCEDURE — 3078F PR MOST RECENT DIASTOLIC BLOOD PRESSURE < 80 MM HG: ICD-10-PCS | Mod: CPTII,,, | Performed by: OBSTETRICS & GYNECOLOGY

## 2023-08-30 RX ORDER — NORETHINDRONE ACETATE AND ETHINYL ESTRADIOL AND FERROUS FUMARATE 1MG-20(24)
1 KIT ORAL DAILY
Qty: 28 TABLET | Refills: 11 | Status: SHIPPED | OUTPATIENT
Start: 2023-08-30 | End: 2024-08-29

## 2023-08-30 RX ORDER — BUPROPION HYDROCHLORIDE 150 MG/1
150 TABLET ORAL DAILY
Qty: 30 TABLET | Refills: 5 | Status: SHIPPED | OUTPATIENT
Start: 2023-08-30 | End: 2023-11-22

## 2023-08-30 NOTE — PROGRESS NOTES
Subjective:       Patient ID: Aye Caceres is a 23 y.o. female.    Chief Complaint:  Weight Loss and Depression (/)      History of Present Illness  DepressionPatient presents with the following symptoms: nervousness/anxiety.  Patient is not experiencing: confusion, palpitations and shortness of breath.        Pt c/o trouble with losing weight even with rx of contrave.  Monitoring diet with 1200 kcal per day.  On DepoProvera.      She also c/o PP depression.  She is on Zoloft 100mg.      GYN & OB History  No LMP recorded. Patient has had an injection.   Date of Last Pap: No result found    OB History    Para Term  AB Living   2 2 1 1 0 2   SAB IAB Ectopic Multiple Live Births   0 0 0 0 2      # Outcome Date GA Lbr Quinten/2nd Weight Sex Delivery Anes PTL Lv   2 Term 24 38w1d  3.062 kg (6 lb 12 oz) F CS-LTranv Spinal  MCKENZIE   1  23 30w0d  1.247 kg (2 lb 12 oz) F CS-LTranv  Y MCKENZIE      Birth Comments: Pre-e       Review of Systems  Review of Systems   Constitutional:  Positive for unexpected weight change. Negative for chills, diaphoresis, fatigue and fever.   HENT:  Negative for congestion, hearing loss, rhinorrhea and sore throat.    Eyes:  Negative for pain, discharge and visual disturbance.   Respiratory:  Negative for apnea, cough, shortness of breath and wheezing.    Cardiovascular:  Negative for chest pain, palpitations and leg swelling.   Gastrointestinal:  Positive for abdominal pain and nausea. Negative for constipation, diarrhea and vomiting.   Endocrine: Negative for cold intolerance and heat intolerance.   Genitourinary:  Negative for difficulty urinating, dyspareunia, dysuria, flank pain, frequency, genital sores, hematuria, menstrual problem, pelvic pain, vaginal bleeding, vaginal discharge and vaginal pain.   Musculoskeletal:  Negative for arthralgias, back pain and joint swelling.   Skin:  Negative for rash.   Neurological:  Negative for dizziness, weakness,  light-headedness, numbness and headaches.   Psychiatric/Behavioral:  Positive for depression and dysphoric mood. Negative for agitation and confusion. The patient is nervous/anxious.            Objective:    Physical Exam:   Constitutional: She is oriented to person, place, and time. She appears well-developed and well-nourished. No distress.    HENT:   Head: Normocephalic and atraumatic.    Eyes: Conjunctivae and EOM are normal.      Pulmonary/Chest: Effort normal. No respiratory distress.                  Musculoskeletal: Normal range of motion.       Neurological: She is alert and oriented to person, place, and time.    Skin: Skin is warm and dry.    Psychiatric: She has a normal mood and affect. Her behavior is normal. Judgment and thought content normal.          Assessment:        1. Postpartum depression    2. Weight loss counseling, encounter for    3. Encounter for initial prescription of contraceptive pills               Plan:      Aye was seen today for weight loss and depression.    Diagnoses and all orders for this visit:    Postpartum depression  -     buPROPion (WELLBUTRIN XL) 150 MG TB24 tablet; Take 1 tablet (150 mg total) by mouth once daily.    Weight loss counseling, encounter for  -     buPROPion (WELLBUTRIN XL) 150 MG TB24 tablet; Take 1 tablet (150 mg total) by mouth once daily.    Encounter for initial prescription of contraceptive pills  -     norethindrone-e.estradioL-iron (JUNEL FE 24) 1 mg-20 mcg (24)/75 mg (4) per tablet; Take 1 tablet by mouth once daily.      Follow up in 2 months.

## 2023-09-26 ENCOUNTER — PATIENT MESSAGE (OUTPATIENT)
Dept: OBSTETRICS AND GYNECOLOGY | Facility: CLINIC | Age: 23
End: 2023-09-26
Payer: MEDICAID

## 2023-09-26 NOTE — TELEPHONE ENCOUNTER
Pt has complaints of no weight loss with 1200 calorie diet. Pt notices increase in depression due to no changes in weight. Pt was seen on 8/30/23 to discuss weight gain and depression. Pt started taking Wellbutrin  mg on 8/30. Pt also on zoloft 100 mg from [facility]. Pt also has an annual scheduled on 10/3/23. Please advise. Thank you.

## 2023-09-28 ENCOUNTER — PATIENT MESSAGE (OUTPATIENT)
Dept: INTERNAL MEDICINE | Facility: CLINIC | Age: 23
End: 2023-09-28
Payer: MEDICAID

## 2023-10-09 ENCOUNTER — OFFICE VISIT (OUTPATIENT)
Dept: URGENT CARE | Facility: CLINIC | Age: 23
End: 2023-10-09
Payer: MEDICAID

## 2023-10-09 VITALS
TEMPERATURE: 99 F | DIASTOLIC BLOOD PRESSURE: 71 MMHG | OXYGEN SATURATION: 97 % | HEART RATE: 77 BPM | SYSTOLIC BLOOD PRESSURE: 132 MMHG | RESPIRATION RATE: 18 BRPM | HEIGHT: 65 IN | BODY MASS INDEX: 38.32 KG/M2 | WEIGHT: 230 LBS

## 2023-10-09 DIAGNOSIS — J06.9 URI WITH COUGH AND CONGESTION: Primary | ICD-10-CM

## 2023-10-09 DIAGNOSIS — E78.5 HYPERLIPIDEMIA, UNSPECIFIED HYPERLIPIDEMIA TYPE: ICD-10-CM

## 2023-10-09 DIAGNOSIS — R05.9 COUGH, UNSPECIFIED TYPE: ICD-10-CM

## 2023-10-09 DIAGNOSIS — F41.1 GENERALIZED ANXIETY DISORDER: ICD-10-CM

## 2023-10-09 DIAGNOSIS — J45.20 MILD INTERMITTENT ASTHMA WITHOUT COMPLICATION: ICD-10-CM

## 2023-10-09 LAB
CTP QC/QA: YES
SARS-COV-2 AG RESP QL IA.RAPID: NEGATIVE

## 2023-10-09 PROCEDURE — 87811 SARS-COV-2 COVID19 W/OPTIC: CPT | Mod: QW,S$GLB,, | Performed by: PHYSICIAN ASSISTANT

## 2023-10-09 PROCEDURE — 99213 OFFICE O/P EST LOW 20 MIN: CPT | Mod: S$GLB,,, | Performed by: PHYSICIAN ASSISTANT

## 2023-10-09 PROCEDURE — 87811 SARS CORONAVIRUS 2 ANTIGEN POCT, MANUAL READ: ICD-10-PCS | Mod: QW,S$GLB,, | Performed by: PHYSICIAN ASSISTANT

## 2023-10-09 PROCEDURE — 99213 PR OFFICE/OUTPT VISIT, EST, LEVL III, 20-29 MIN: ICD-10-PCS | Mod: S$GLB,,, | Performed by: PHYSICIAN ASSISTANT

## 2023-10-09 RX ORDER — DEXAMETHASONE SODIUM PHOSPHATE 100 MG/10ML
8 INJECTION INTRAMUSCULAR; INTRAVENOUS
Status: COMPLETED | OUTPATIENT
Start: 2023-10-09 | End: 2023-10-09

## 2023-10-09 RX ORDER — BROMPHENIRAMINE MALEATE, PSEUDOEPHEDRINE HYDROCHLORIDE, AND DEXTROMETHORPHAN HYDROBROMIDE 2; 30; 10 MG/5ML; MG/5ML; MG/5ML
10 SYRUP ORAL EVERY 6 HOURS PRN
Qty: 118 ML | Refills: 0 | Status: SHIPPED | OUTPATIENT
Start: 2023-10-09 | End: 2023-10-19

## 2023-10-09 RX ADMIN — DEXAMETHASONE SODIUM PHOSPHATE 8 MG: 100 INJECTION INTRAMUSCULAR; INTRAVENOUS at 09:10

## 2023-10-09 NOTE — PATIENT INSTRUCTIONS
You have been prescribed a steroid today. Take the prescription as directed. Steroids can increase blood sugar. You can also have the following when taking steroids: flushing, jitteriness, weight gain, fluid retention, bone weakening. If you develop any adverse symptoms, stop taking the medication immediately.    You must understand that you've received an Urgent Care treatment only and that you may be released before all your medical problems are known or treated. You, the patient, will arrange for follow up care as instructed.      Follow up with your PCP or specialty clinic as instructed in the next 2-3 days if not improved or as needed. You can call [phone] to schedule an appointment with appropriate provider.      If you condition worsens, we recommend that you receive another evaluation at the emergency room immediately or contact your primary medical clinic's after hours call service to discuss your concerns.      Please return here or go to the Emergency Department for any concerns or worsening condition.      If you were prescribed a narcotic or controlled substance, do not drive or operate heavy equipment or machinery while taking these medications.

## 2023-10-09 NOTE — PROGRESS NOTES
"Subjective:      Patient ID: Aye Caceres is a 23 y.o. female.    Vitals:  height is 5' 5" (1.651 m) and weight is 104.3 kg (230 lb). Her oral temperature is 98.7 °F (37.1 °C). Her blood pressure is 132/71 and her pulse is 77. Her respiration is 18 and oxygen saturation is 97%.     Chief Complaint: Sinus Problem    Patient provider note starts here:  Patient presents with complaints of URI like symptoms for the past few days. History of asthma. Denies fevers. Chest tightness but no shortness of breath. Denies relief with OTC medications.     Sinus Problem  This is a new problem. Episode onset: Thursday. The problem has been gradually worsening since onset. There has been no fever. Her pain is at a severity of 5/10. The pain is moderate. Associated symptoms include congestion, coughing, ear pain, headaches, sinus pressure, sneezing and a sore throat. Pertinent negatives include no chills, diaphoresis, hoarse voice, neck pain, shortness of breath or swollen glands. Past treatments include oral decongestants and acetaminophen. The treatment provided no relief.       Constitution: Negative for chills, sweating and fever.   HENT:  Positive for ear pain, congestion, sinus pressure and sore throat.    Neck: Negative for neck pain.   Cardiovascular:  Negative for chest pain, palpitations and sob on exertion.   Respiratory:  Positive for chest tightness, cough and asthma. Negative for sputum production, shortness of breath and wheezing.    Gastrointestinal:  Negative for abdominal pain, vomiting and diarrhea.   Skin:  Negative for color change and wound.   Allergic/Immunologic: Positive for asthma and sneezing.   Neurological:  Positive for headaches. Negative for numbness and tingling.      Objective:     Physical Exam   Constitutional: She is oriented to person, place, and time. She appears well-developed. She is cooperative.  Non-toxic appearance. She does not appear ill. No distress.   HENT:   Head: Normocephalic " and atraumatic.   Ears:   Right Ear: Hearing, tympanic membrane, external ear and ear canal normal.   Left Ear: Hearing, tympanic membrane, external ear and ear canal normal.   Nose: Congestion present. No mucosal edema, rhinorrhea or nasal deformity. No epistaxis. Right sinus exhibits no maxillary sinus tenderness and no frontal sinus tenderness. Left sinus exhibits no maxillary sinus tenderness and no frontal sinus tenderness.   Mouth/Throat: Uvula is midline, oropharynx is clear and moist and mucous membranes are normal. No trismus in the jaw. Normal dentition. No uvula swelling. No oropharyngeal exudate, posterior oropharyngeal edema or posterior oropharyngeal erythema.   Eyes: Conjunctivae and lids are normal. No scleral icterus.   Neck: Trachea normal and phonation normal. Neck supple. No edema present. No erythema present. No neck rigidity present.   Cardiovascular: Normal rate, regular rhythm, normal heart sounds and normal pulses.   Pulmonary/Chest: Effort normal. No respiratory distress. She has no decreased breath sounds. She has wheezes (Faint expiratory wheezing noted to the bilateral lower lung fields. There is no respiratory distress noted.). She has no rhonchi.   Abdominal: Normal appearance.   Musculoskeletal: Normal range of motion.         General: No deformity. Normal range of motion.   Neurological: She is alert and oriented to person, place, and time. She exhibits normal muscle tone. Coordination normal.   Skin: Skin is warm, dry, intact, not diaphoretic and not pale.   Psychiatric: Her speech is normal and behavior is normal. Judgment and thought content normal.   Nursing note and vitals reviewed.      Assessment:     1. URI with cough and congestion    2. Cough, unspecified type    3. Generalized anxiety disorder    4. Hyperlipidemia, unspecified hyperlipidemia type    5. Mild intermittent asthma without complication      Results for orders placed or performed in visit on 10/09/23   SARS  Coronavirus 2 Antigen, POCT Manual Read   Result Value Ref Range    SARS Coronavirus 2 Antigen Negative Negative     Acceptable Yes        Plan:       URI with cough and congestion  -     dexAMETHasone injection 8 mg  -     brompheniramine-pseudoeph-DM (BROMFED DM) 2-30-10 mg/5 mL Syrp; Take 10 mLs by mouth every 6 (six) hours as needed (Cough and congestion).  Dispense: 118 mL; Refill: 0    Cough, unspecified type  -     SARS Coronavirus 2 Antigen, POCT Manual Read    Generalized anxiety disorder    Hyperlipidemia, unspecified hyperlipidemia type    Mild intermittent asthma without complication          Medical Decision Making:   History:   Old Medical Records: I decided to obtain old medical records.  Clinical Tests:   Lab Tests: Ordered and Reviewed  Urgent Care Management:  A. Problem List:   -Acute: URI with cough and congestion   -Chronic: Asthma, hyperlipidemia, generalized anxiety disorder  B. Differential diagnosis: viral vs bacterial URI, pharyngitis, otitis, COVID 19, influenza, pneumonia, asthmatic exacerbation  C. Diagnostic Testing Ordered: COVID  D. Diagnostic Testing Considered: None  E. Independent Historians: None  F. Urgent Care Midlevel Independent Results Interpretation: COVID negative  G. Radiology:  H. Review of Previous Medical Records: Patient has been attempting to lose weight recently. History of generalized anxiety disorder and recently placed on Sertraline 100mg by PCP in May of this year after she felt that the previous medication was not working. Evaluated by PCP in August and felt that Zoloft was not working and was subsequently prescribed Wellbutrin.   I. Home Medications Reviewed  J. Social Determinants of Health considered  K. Medical Decision Making and Disposition: Patient presents with complaints of having URI like symptoms with some chest tightness. History of asthma. On exam, she is afebrile and nontoxic appearing. Lower lung fields with faint wheezing on exam  but is no respiratory distress. Vitals are stable. COVID negative. She has albuterol at home to use if needed. Administered steroid injection in clinic (risks discussed with patient) and encouraged close follow-up with PCP. ED precautions discussed, she verbalized understanding and agreed with plan.          Patient Instructions   You have been prescribed a steroid today. Take the prescription as directed. Steroids can increase blood sugar. You can also have the following when taking steroids: flushing, jitteriness, weight gain, fluid retention, bone weakening. If you develop any adverse symptoms, stop taking the medication immediately.    You must understand that you've received an Urgent Care treatment only and that you may be released before all your medical problems are known or treated. You, the patient, will arrange for follow up care as instructed.      Follow up with your PCP or specialty clinic as instructed in the next 2-3 days if not improved or as needed. You can call [phone] to schedule an appointment with appropriate provider.      If you condition worsens, we recommend that you receive another evaluation at the emergency room immediately or contact your primary medical clinic's after hours call service to discuss your concerns.      Please return here or go to the Emergency Department for any concerns or worsening condition.      If you were prescribed a narcotic or controlled substance, do not drive or operate heavy equipment or machinery while taking these medications.

## 2023-10-09 NOTE — LETTER
October 9, 2023      [facility]  [address], [address]  [place] [address]  Phone: [phone]  Fax: [phone]       Patient: Aye Caceres   YOB: 2000  Date of Visit: 10/09/2023    To Whom It May Concern:    Tamia Caceres  was at [facility] on 10/09/2023. The patient may return to work/school on 10/09/2023 with no restrictions. If you have any questions or concerns, or if I can be of further assistance, please do not hesitate to contact me.    Sincerely,    Marion Alves, RT

## 2023-10-12 DIAGNOSIS — Z30.42 ENCOUNTER FOR DEPO-PROVERA CONTRACEPTION: ICD-10-CM

## 2023-10-12 RX ORDER — MEDROXYPROGESTERONE ACETATE 150 MG/ML
150 INJECTION, SUSPENSION INTRAMUSCULAR
Qty: 1 ML | Refills: 0 | OUTPATIENT
Start: 2023-10-12

## 2023-11-22 ENCOUNTER — PATIENT MESSAGE (OUTPATIENT)
Dept: INTERNAL MEDICINE | Facility: CLINIC | Age: 23
End: 2023-11-22

## 2023-11-22 ENCOUNTER — OFFICE VISIT (OUTPATIENT)
Dept: INTERNAL MEDICINE | Facility: CLINIC | Age: 23
End: 2023-11-22
Payer: MEDICAID

## 2023-11-22 VITALS
HEIGHT: 65 IN | DIASTOLIC BLOOD PRESSURE: 80 MMHG | SYSTOLIC BLOOD PRESSURE: 118 MMHG | OXYGEN SATURATION: 98 % | WEIGHT: 235.25 LBS | RESPIRATION RATE: 19 BRPM | BODY MASS INDEX: 39.2 KG/M2 | HEART RATE: 92 BPM

## 2023-11-22 DIAGNOSIS — F41.1 GAD (GENERALIZED ANXIETY DISORDER): Primary | ICD-10-CM

## 2023-11-22 DIAGNOSIS — F33.0 MILD EPISODE OF RECURRENT MAJOR DEPRESSIVE DISORDER: ICD-10-CM

## 2023-11-22 PROCEDURE — 3079F PR MOST RECENT DIASTOLIC BLOOD PRESSURE 80-89 MM HG: ICD-10-PCS | Mod: CPTII,,, | Performed by: NURSE PRACTITIONER

## 2023-11-22 PROCEDURE — 3008F PR BODY MASS INDEX (BMI) DOCUMENTED: ICD-10-PCS | Mod: CPTII,,, | Performed by: NURSE PRACTITIONER

## 2023-11-22 PROCEDURE — 1159F MED LIST DOCD IN RCRD: CPT | Mod: CPTII,,, | Performed by: NURSE PRACTITIONER

## 2023-11-22 PROCEDURE — 3044F HG A1C LEVEL LT 7.0%: CPT | Mod: CPTII,,, | Performed by: NURSE PRACTITIONER

## 2023-11-22 PROCEDURE — 1159F PR MEDICATION LIST DOCUMENTED IN MEDICAL RECORD: ICD-10-PCS | Mod: CPTII,,, | Performed by: NURSE PRACTITIONER

## 2023-11-22 PROCEDURE — 99214 OFFICE O/P EST MOD 30 MIN: CPT | Mod: S$PBB,,, | Performed by: NURSE PRACTITIONER

## 2023-11-22 PROCEDURE — 3008F BODY MASS INDEX DOCD: CPT | Mod: CPTII,,, | Performed by: NURSE PRACTITIONER

## 2023-11-22 PROCEDURE — 3074F SYST BP LT 130 MM HG: CPT | Mod: CPTII,,, | Performed by: NURSE PRACTITIONER

## 2023-11-22 PROCEDURE — 99999 PR PBB SHADOW E&M-EST. PATIENT-LVL III: ICD-10-PCS | Mod: PBBFAC,,, | Performed by: NURSE PRACTITIONER

## 2023-11-22 PROCEDURE — 99999 PR PBB SHADOW E&M-EST. PATIENT-LVL III: CPT | Mod: PBBFAC,,, | Performed by: NURSE PRACTITIONER

## 2023-11-22 PROCEDURE — 3074F PR MOST RECENT SYSTOLIC BLOOD PRESSURE < 130 MM HG: ICD-10-PCS | Mod: CPTII,,, | Performed by: NURSE PRACTITIONER

## 2023-11-22 PROCEDURE — 3044F PR MOST RECENT HEMOGLOBIN A1C LEVEL <7.0%: ICD-10-PCS | Mod: CPTII,,, | Performed by: NURSE PRACTITIONER

## 2023-11-22 PROCEDURE — 99214 PR OFFICE/OUTPT VISIT, EST, LEVL IV, 30-39 MIN: ICD-10-PCS | Mod: S$PBB,,, | Performed by: NURSE PRACTITIONER

## 2023-11-22 PROCEDURE — 3079F DIAST BP 80-89 MM HG: CPT | Mod: CPTII,,, | Performed by: NURSE PRACTITIONER

## 2023-11-22 PROCEDURE — 99213 OFFICE O/P EST LOW 20 MIN: CPT | Mod: PBBFAC,PN | Performed by: NURSE PRACTITIONER

## 2023-11-22 RX ORDER — FLUOXETINE 10 MG/1
10 CAPSULE ORAL DAILY
Qty: 30 CAPSULE | Refills: 2 | Status: SHIPPED | OUTPATIENT
Start: 2023-11-22 | End: 2023-12-14

## 2023-11-22 NOTE — PROGRESS NOTES
Subjective:       Patient ID: Aye Caceres is a 23 y.o. female.    Chief Complaint: Anxiety and Depression    Patient is known, to me and presents with   Chief Complaint   Patient presents with    Anxiety    Depression   .  Denies chest pain and shortness of breath. Patient presents with worsening of depression and anxiety. She feels overwhelmed with raising children and working. She does have a partner but having issues with that. Mood is stable and no sihi noted      Anxiety  Symptoms include nervous/anxious behavior. Patient reports no chest pain, confusion, dizziness, palpitations, shortness of breath or suicidal ideas.       DepressionPatient presents with the following symptoms: nervousness/anxiety.  Patient is not experiencing: confusion, palpitations, shortness of breath and suicidal ideas.        Review of Systems   Constitutional:  Negative for activity change, appetite change, fatigue, fever and unexpected weight change.   HENT:  Negative for congestion, ear discharge, ear pain, hearing loss, postnasal drip and tinnitus.    Eyes:  Negative for photophobia, pain and visual disturbance.   Respiratory:  Negative for cough, shortness of breath, wheezing and stridor.    Cardiovascular:  Negative for chest pain, palpitations and leg swelling.   Gastrointestinal:  Negative for abdominal distention.   Genitourinary:  Negative for difficulty urinating, dysuria, frequency, hematuria and urgency.   Musculoskeletal:  Negative for arthralgias, back pain, gait problem, joint swelling and neck pain.   Skin: Negative.    Neurological:  Negative for dizziness, seizures, syncope, weakness, light-headedness, numbness and headaches.   Hematological:  Negative for adenopathy. Does not bruise/bleed easily.   Psychiatric/Behavioral:  Positive for depression and dysphoric mood. Negative for behavioral problems, confusion, hallucinations, sleep disturbance and suicidal ideas. The patient is nervous/anxious.         Objective:      Physical Exam  Constitutional:       General: She is not in acute distress.     Appearance: She is well-developed.   HENT:      Head: Normocephalic and atraumatic.      Right Ear: Tympanic membrane and external ear normal.      Left Ear: Tympanic membrane and external ear normal.      Nose: Nose normal.      Mouth/Throat:      Mouth: Mucous membranes are moist.      Pharynx: No oropharyngeal exudate.   Eyes:      General: No scleral icterus.        Right eye: No discharge.         Left eye: No discharge.      Conjunctiva/sclera: Conjunctivae normal.      Pupils: Pupils are equal, round, and reactive to light.   Neck:      Thyroid: No thyromegaly.      Vascular: No JVD.   Cardiovascular:      Rate and Rhythm: Normal rate and regular rhythm.      Heart sounds: Normal heart sounds. No murmur heard.     No friction rub. No gallop.   Pulmonary:      Effort: Pulmonary effort is normal. No respiratory distress.      Breath sounds: Normal breath sounds. No stridor. No wheezing or rales.   Chest:      Chest wall: No tenderness.   Abdominal:      General: Bowel sounds are normal. There is no distension.      Palpations: Abdomen is soft. There is no mass.      Tenderness: There is no abdominal tenderness. There is no right CVA tenderness, left CVA tenderness, guarding or rebound.      Hernia: No hernia is present.   Musculoskeletal:         General: No swelling, tenderness, deformity or signs of injury. Normal range of motion.      Cervical back: Normal range of motion and neck supple.      Right lower leg: No edema.      Left lower leg: No edema.   Lymphadenopathy:      Cervical: No cervical adenopathy.   Skin:     General: Skin is warm and dry.      Capillary Refill: Capillary refill takes less than 2 seconds.      Coloration: Skin is not jaundiced or pale.      Findings: No bruising, erythema, lesion or rash.   Neurological:      General: No focal deficit present.      Mental Status: She is alert and  "oriented to person, place, and time.      Cranial Nerves: No cranial nerve deficit.      Sensory: No sensory deficit.      Motor: No weakness or abnormal muscle tone.      Coordination: Coordination normal.      Gait: Gait normal.      Deep Tendon Reflexes: Reflexes are normal and symmetric. Reflexes normal.   Psychiatric:         Attention and Perception: She does not perceive auditory or visual hallucinations.         Mood and Affect: Mood is anxious and depressed. Affect is tearful.         Speech: Speech normal.         Behavior: Behavior normal. Behavior is cooperative.         Thought Content: Thought content normal. Thought content does not include homicidal or suicidal ideation. Thought content does not include homicidal or suicidal plan.         Cognition and Memory: Cognition and memory normal.         Judgment: Judgment normal.         Assessment:       1. IVY (generalized anxiety disorder)    2. Mild episode of recurrent major depressive disorder        Plan:   1. IVY (generalized anxiety disorder)  -     FLUoxetine 10 MG capsule; Take 1 capsule (10 mg total) by mouth once daily.  Dispense: 30 capsule; Refill: 2    2. Mild episode of recurrent major depressive disorder  -     FLUoxetine 10 MG capsule; Take 1 capsule (10 mg total) by mouth once daily.  Dispense: 30 capsule; Refill: 2       "This note will not be shared with the patient."  Stop zoloft and wean off wellbutrin  I've explained to her that drugs of the SSRI class can have side effects such as weight gain, sexual dysfunction, insomnia, headache, nausea. These medications are generally effective at alleviating symptoms of anxiety and/or depression. Let me know if significant side effects do occur.   If thoughts of sihi occur stop meds and call 911  Was already on prozac and worked for her  Rtc as scheduled   "

## 2023-12-13 ENCOUNTER — PATIENT MESSAGE (OUTPATIENT)
Dept: INTERNAL MEDICINE | Facility: CLINIC | Age: 23
End: 2023-12-13
Payer: MEDICAID

## 2023-12-14 ENCOUNTER — OFFICE VISIT (OUTPATIENT)
Dept: INTERNAL MEDICINE | Facility: CLINIC | Age: 23
End: 2023-12-14
Payer: MEDICAID

## 2023-12-14 DIAGNOSIS — F41.1 GAD (GENERALIZED ANXIETY DISORDER): Primary | ICD-10-CM

## 2023-12-14 DIAGNOSIS — F33.0 MILD EPISODE OF RECURRENT MAJOR DEPRESSIVE DISORDER: ICD-10-CM

## 2023-12-14 PROCEDURE — 3044F HG A1C LEVEL LT 7.0%: CPT | Mod: CPTII,95,, | Performed by: NURSE PRACTITIONER

## 2023-12-14 PROCEDURE — 3044F PR MOST RECENT HEMOGLOBIN A1C LEVEL <7.0%: ICD-10-PCS | Mod: CPTII,95,, | Performed by: NURSE PRACTITIONER

## 2023-12-14 PROCEDURE — 99212 OFFICE O/P EST SF 10 MIN: CPT | Mod: 95,,, | Performed by: NURSE PRACTITIONER

## 2023-12-14 PROCEDURE — 99212 PR OFFICE/OUTPT VISIT, EST, LEVL II, 10-19 MIN: ICD-10-PCS | Mod: 95,,, | Performed by: NURSE PRACTITIONER

## 2023-12-14 RX ORDER — FLUOXETINE HYDROCHLORIDE 20 MG/1
20 CAPSULE ORAL DAILY
Qty: 30 CAPSULE | Refills: 5 | Status: SHIPPED | OUTPATIENT
Start: 2023-12-14 | End: 2024-01-18

## 2023-12-14 NOTE — PROGRESS NOTES
"The patient location is: at home  The chief complaint leading to consultation is: follow up prozac    Visit type: audiovisual    Face to Face time with patient: 5-10 minutes of total time spent on the encounter, which includes face to face time and non-face to face time preparing to see the patient (eg, review of tests), Obtaining and/or reviewing separately obtained history, Documenting clinical information in the electronic or other health record, Independently interpreting results (not separately reported) and communicating results to the patient/family/caregiver, or Care coordination (not separately reported).         Each patient to whom he or she provides medical services by telemedicine is:  (1) informed of the relationship between the physician and patient and the respective role of any other health care provider with respect to management of the patient; and (2) notified that he or she may decline to receive medical services by telemedicine and may withdraw from such care at any time.    Notes:     Patient is doing much better on prozac. She does want to increase the dose. Mood is stable and no sihi noted. She is happier and less stressed.     1. IVY (generalized anxiety disorder)  FLUoxetine 20 MG capsule      2. Mild episode of recurrent major depressive disorder  FLUoxetine 20 MG capsule       "This note will not be shared with the patient."   I've explained to her that drugs of the SSRI class can have side effects such as weight gain, sexual dysfunction, insomnia, headache, nausea. These medications are generally effective at alleviating symptoms of anxiety and/or depression. Let me know if significant side effects do occur.   Rtc in four weeks  "

## 2023-12-15 ENCOUNTER — PATIENT MESSAGE (OUTPATIENT)
Dept: INTERNAL MEDICINE | Facility: CLINIC | Age: 23
End: 2023-12-15
Payer: MEDICAID

## 2024-01-10 ENCOUNTER — PATIENT MESSAGE (OUTPATIENT)
Dept: INTERNAL MEDICINE | Facility: CLINIC | Age: 24
End: 2024-01-10
Payer: MEDICAID

## 2024-01-18 ENCOUNTER — OFFICE VISIT (OUTPATIENT)
Dept: INTERNAL MEDICINE | Facility: CLINIC | Age: 24
End: 2024-01-18
Payer: MEDICAID

## 2024-01-18 ENCOUNTER — PATIENT MESSAGE (OUTPATIENT)
Dept: INTERNAL MEDICINE | Facility: CLINIC | Age: 24
End: 2024-01-18

## 2024-01-18 DIAGNOSIS — F41.1 GAD (GENERALIZED ANXIETY DISORDER): Primary | ICD-10-CM

## 2024-01-18 DIAGNOSIS — F33.0 MILD EPISODE OF RECURRENT MAJOR DEPRESSIVE DISORDER: ICD-10-CM

## 2024-01-18 PROCEDURE — 99212 OFFICE O/P EST SF 10 MIN: CPT | Mod: 95,,, | Performed by: NURSE PRACTITIONER

## 2024-01-18 RX ORDER — FLUOXETINE HYDROCHLORIDE 40 MG/1
40 CAPSULE ORAL DAILY
Qty: 30 CAPSULE | Refills: 2 | Status: SHIPPED | OUTPATIENT
Start: 2024-01-18 | End: 2024-04-29

## 2024-01-18 NOTE — PROGRESS NOTES
"The patient location is: at work  The chief complaint leading to consultation is: states that her depression is much better, but anxiety is still present. No sihi noted    Visit type: audiovisual    Face to Face time with patient: 5-10    minutes of total time spent on the encounter, which includes face to face time and non-face to face time preparing to see the patient (eg, review of tests), Obtaining and/or reviewing separately obtained history, Documenting clinical information in the electronic or other health record, Independently interpreting results (not separately reported) and communicating results to the patient/family/caregiver, or Care coordination (not separately reported).         Each patient to whom he or she provides medical services by telemedicine is:  (1) informed of the relationship between the physician and patient and the respective role of any other health care provider with respect to management of the patient; and (2) notified that he or she may decline to receive medical services by telemedicine and may withdraw from such care at any time.    Notes:  patient presents with depression and anxiety follow up. Since increasing the prozac her depression is much better, but she is still irritable and finds her anxiety has not improved. No sihi noted    1. IVY (generalized anxiety disorder)  FLUoxetine 40 MG capsule      2. Mild episode of recurrent major depressive disorder  FLUoxetine 40 MG capsule       "This note will not be shared with the patient."  Increase prozac to 40 mg   Rtc as scheduled   "

## 2024-02-06 ENCOUNTER — PATIENT MESSAGE (OUTPATIENT)
Dept: OBSTETRICS AND GYNECOLOGY | Facility: CLINIC | Age: 24
End: 2024-02-06
Payer: MEDICAID

## 2024-03-20 ENCOUNTER — PATIENT MESSAGE (OUTPATIENT)
Dept: INTERNAL MEDICINE | Facility: CLINIC | Age: 24
End: 2024-03-20
Payer: MEDICAID

## 2024-04-27 DIAGNOSIS — F41.1 GAD (GENERALIZED ANXIETY DISORDER): ICD-10-CM

## 2024-04-27 DIAGNOSIS — F33.0 MILD EPISODE OF RECURRENT MAJOR DEPRESSIVE DISORDER: ICD-10-CM

## 2024-04-29 RX ORDER — FLUOXETINE HYDROCHLORIDE 40 MG/1
40 CAPSULE ORAL
Qty: 30 CAPSULE | Refills: 0 | Status: SHIPPED | OUTPATIENT
Start: 2024-04-29 | End: 2024-05-30

## 2024-05-13 ENCOUNTER — PATIENT MESSAGE (OUTPATIENT)
Dept: INTERNAL MEDICINE | Facility: CLINIC | Age: 24
End: 2024-05-13
Payer: MEDICAID

## 2024-05-16 ENCOUNTER — OFFICE VISIT (OUTPATIENT)
Dept: INTERNAL MEDICINE | Facility: CLINIC | Age: 24
End: 2024-05-16
Payer: MEDICAID

## 2024-05-16 DIAGNOSIS — F41.1 GAD (GENERALIZED ANXIETY DISORDER): Primary | ICD-10-CM

## 2024-05-16 PROCEDURE — 99212 OFFICE O/P EST SF 10 MIN: CPT | Mod: 95,,, | Performed by: NURSE PRACTITIONER

## 2024-05-16 PROCEDURE — 1160F RVW MEDS BY RX/DR IN RCRD: CPT | Mod: CPTII,95,, | Performed by: NURSE PRACTITIONER

## 2024-05-16 PROCEDURE — 1159F MED LIST DOCD IN RCRD: CPT | Mod: CPTII,95,, | Performed by: NURSE PRACTITIONER

## 2024-05-16 RX ORDER — FLUOXETINE HYDROCHLORIDE 20 MG/1
20 CAPSULE ORAL DAILY
Qty: 30 CAPSULE | Refills: 5 | Status: SHIPPED | OUTPATIENT
Start: 2024-05-16 | End: 2025-05-16

## 2024-05-16 NOTE — PROGRESS NOTES
"The patient location is: work  The chief complaint leading to consultation is: patient would like to increase her prozac dose.     Visit type: audiovisual    Face to Face time with patient: 5-10 minutes of total time spent on the encounter, which includes face to face time and non-face to face time preparing to see the patient (eg, review of tests), Obtaining and/or reviewing separately obtained history, Documenting clinical information in the electronic or other health record, Independently interpreting results (not separately reported) and communicating results to the patient/family/caregiver, or Care coordination (not separately reported).         Each patient to whom he or she provides medical services by telemedicine is:  (1) informed of the relationship between the physician and patient and the respective role of any other health care provider with respect to management of the patient; and (2) notified that he or she may decline to receive medical services by telemedicine and may withdraw from such care at any time.    Notes:    Patient presents with wanting to go up on her prozac, she states that she is more anxious lately. No sihi noted. Just feels like she is more irritable.     1. IVY (generalized anxiety disorder)  FLUoxetine 20 MG capsule       "This note will not be shared with the patient."      I've explained to her that drugs of the SSRI class can have side effects such as weight gain, sexual dysfunction, insomnia, headache, nausea. These medications are generally effective at alleviating symptoms of anxiety and/or depression. Let me know if significant side effects do occur.   She will let me know how it is working in a couple of weeks.     Rtc as scheduled      "

## 2024-05-30 DIAGNOSIS — F33.0 MILD EPISODE OF RECURRENT MAJOR DEPRESSIVE DISORDER: ICD-10-CM

## 2024-05-30 DIAGNOSIS — F41.1 GAD (GENERALIZED ANXIETY DISORDER): ICD-10-CM

## 2024-05-30 RX ORDER — FLUOXETINE HYDROCHLORIDE 40 MG/1
40 CAPSULE ORAL
Qty: 30 CAPSULE | Refills: 5 | Status: SHIPPED | OUTPATIENT
Start: 2024-05-30

## 2024-06-03 ENCOUNTER — PATIENT MESSAGE (OUTPATIENT)
Dept: INTERNAL MEDICINE | Facility: CLINIC | Age: 24
End: 2024-06-03
Payer: MEDICAID

## 2024-06-03 ENCOUNTER — OFFICE VISIT (OUTPATIENT)
Dept: URGENT CARE | Facility: CLINIC | Age: 24
End: 2024-06-03
Payer: MEDICAID

## 2024-06-03 ENCOUNTER — PATIENT MESSAGE (OUTPATIENT)
Dept: OBSTETRICS AND GYNECOLOGY | Facility: CLINIC | Age: 24
End: 2024-06-03
Payer: MEDICAID

## 2024-06-03 VITALS
OXYGEN SATURATION: 96 % | HEIGHT: 65 IN | RESPIRATION RATE: 18 BRPM | WEIGHT: 220 LBS | DIASTOLIC BLOOD PRESSURE: 61 MMHG | SYSTOLIC BLOOD PRESSURE: 132 MMHG | TEMPERATURE: 99 F | BODY MASS INDEX: 36.65 KG/M2 | HEART RATE: 96 BPM

## 2024-06-03 DIAGNOSIS — R05.9 COUGH, UNSPECIFIED TYPE: ICD-10-CM

## 2024-06-03 DIAGNOSIS — E78.2 MIXED HYPERLIPIDEMIA: Primary | ICD-10-CM

## 2024-06-03 DIAGNOSIS — E55.9 VITAMIN D INSUFFICIENCY: ICD-10-CM

## 2024-06-03 DIAGNOSIS — J06.9 UPPER RESPIRATORY TRACT INFECTION, UNSPECIFIED TYPE: Primary | ICD-10-CM

## 2024-06-03 DIAGNOSIS — R09.81 NASAL CONGESTION: ICD-10-CM

## 2024-06-03 DIAGNOSIS — J45.20 MILD INTERMITTENT ASTHMA WITHOUT COMPLICATION: ICD-10-CM

## 2024-06-03 LAB
CTP QC/QA: YES
CTP QC/QA: YES
POC MOLECULAR INFLUENZA A AGN: NEGATIVE
POC MOLECULAR INFLUENZA B AGN: NEGATIVE
SARS-COV-2 AG RESP QL IA.RAPID: NEGATIVE

## 2024-06-03 PROCEDURE — 99214 OFFICE O/P EST MOD 30 MIN: CPT | Mod: S$GLB,,, | Performed by: NURSE PRACTITIONER

## 2024-06-03 PROCEDURE — 87502 INFLUENZA DNA AMP PROBE: CPT | Mod: QW,S$GLB,, | Performed by: NURSE PRACTITIONER

## 2024-06-03 PROCEDURE — 87811 SARS-COV-2 COVID19 W/OPTIC: CPT | Mod: QW,S$GLB,, | Performed by: NURSE PRACTITIONER

## 2024-06-03 RX ORDER — MONTELUKAST SODIUM 10 MG/1
10 TABLET ORAL NIGHTLY
Qty: 30 TABLET | Refills: 5 | Status: SHIPPED | OUTPATIENT
Start: 2024-06-03

## 2024-06-03 RX ORDER — FLUTICASONE PROPIONATE 50 MCG
1 SPRAY, SUSPENSION (ML) NASAL DAILY
Qty: 16 G | Refills: 3 | Status: SHIPPED | OUTPATIENT
Start: 2024-06-03

## 2024-06-03 RX ORDER — ALBUTEROL SULFATE 0.83 MG/ML
2.5 SOLUTION RESPIRATORY (INHALATION) EVERY 6 HOURS PRN
Qty: 3 ML | Refills: 0 | Status: SHIPPED | OUTPATIENT
Start: 2024-06-03 | End: 2024-07-03

## 2024-06-03 NOTE — PROGRESS NOTES
"Subjective:      Patient ID: Aye Caceres is a 23 y.o. female.    Vitals:  height is 5' 5" (1.651 m) and weight is 99.8 kg (220 lb). Her oral temperature is 98.6 °F (37 °C). Her blood pressure is 132/61 and her pulse is 96. Her respiration is 18 and oxygen saturation is 96%.     Chief Complaint: Cough    24 y/o female presents with a complaint of sweating, nasal congestion, headaches and muscle aches.  Onset of symptoms, today.    Cough  This is a new problem. The current episode started today. The problem has been gradually worsening. The problem occurs constantly. The cough is Productive of sputum. Associated symptoms include headaches, myalgias, nasal congestion and rhinorrhea. Pertinent negatives include no chest pain, chills, ear congestion, ear pain, fever, hemoptysis, postnasal drip, rash, sore throat, shortness of breath, sweats or wheezing. Nothing aggravates the symptoms. Treatments tried: tylenol. Her past medical history is significant for asthma. There is no history of bronchiectasis, bronchitis, COPD, emphysema, environmental allergies or pneumonia.       Constitution: Positive for sweating. Negative for chills, fever and generalized weakness.   HENT:  Positive for congestion. Negative for ear pain, postnasal drip, sinus pain, sinus pressure and sore throat.    Cardiovascular:  Negative for chest pain and palpitations.   Respiratory:  Positive for cough, sputum production and asthma. Negative for chest tightness, bloody sputum, shortness of breath, stridor and wheezing.    Gastrointestinal:  Negative for nausea, vomiting and diarrhea.   Musculoskeletal:  Positive for muscle ache.   Skin:  Negative for rash.   Allergic/Immunologic: Positive for asthma. Negative for environmental allergies.   Neurological:  Positive for headaches.      Objective:     Physical Exam   Constitutional: She is oriented to person, place, and time. She appears well-developed. She is cooperative.  Non-toxic appearance. " She does not appear ill. No distress.   HENT:   Head: Normocephalic and atraumatic.   Ears:   Right Ear: Hearing, tympanic membrane, external ear and ear canal normal. no impacted cerumen  Left Ear: Hearing, tympanic membrane, external ear and ear canal normal. no impacted cerumen  Nose: Rhinorrhea and congestion present. No mucosal edema or nasal deformity. No epistaxis. Right sinus exhibits no maxillary sinus tenderness and no frontal sinus tenderness. Left sinus exhibits no maxillary sinus tenderness and no frontal sinus tenderness.   Mouth/Throat: Uvula is midline, oropharynx is clear and moist and mucous membranes are normal. No trismus in the jaw. Normal dentition. No uvula swelling. No oropharyngeal exudate, posterior oropharyngeal edema or posterior oropharyngeal erythema.   Eyes: Conjunctivae and lids are normal. No scleral icterus.   Neck: Trachea normal and phonation normal. Neck supple. No edema present. No erythema present. No neck rigidity present.   Cardiovascular: Normal rate, regular rhythm, normal heart sounds and normal pulses.   Pulmonary/Chest: Effort normal and breath sounds normal. No stridor. No respiratory distress. She has no decreased breath sounds. She has no wheezes. She has no rhonchi. She has no rales. She exhibits no tenderness.   Abdominal: Normal appearance.   Musculoskeletal: Normal range of motion.         General: No deformity. Normal range of motion.   Neurological: She is alert and oriented to person, place, and time. She exhibits normal muscle tone. Coordination normal.   Skin: Skin is warm, dry, intact, not diaphoretic and not pale.   Psychiatric: Her speech is normal and behavior is normal. Judgment and thought content normal.   Nursing note and vitals reviewed.      Assessment:     1. Upper respiratory tract infection, unspecified type    2. Cough, unspecified type    3. Nasal congestion    4. Mild intermittent asthma without complication      Results for orders placed or  "performed in visit on 06/03/24   POCT Influenza A/B MOLECULAR   Result Value Ref Range    POC Molecular Influenza A Ag Negative Negative    POC Molecular Influenza B Ag Negative Negative     Acceptable Yes    SARS Coronavirus 2 Antigen, POCT Manual Read   Result Value Ref Range    SARS Coronavirus 2 Antigen Negative Negative     Acceptable Yes         Plan:     Upper respiratory tract infection, unspecified type    Cough, unspecified type  -     POCT Influenza A/B MOLECULAR  -     SARS Coronavirus 2 Antigen, POCT Manual Read  -     albuterol (PROVENTIL) 2.5 mg /3 mL (0.083 %) nebulizer solution; Take 3 mLs (2.5 mg total) by nebulization every 6 (six) hours as needed for Wheezing. Rescue  Dispense: 3 mL; Refill: 0    Nasal congestion  -     fluticasone propionate (FLONASE) 50 mcg/actuation nasal spray; 1 spray (50 mcg total) by Each Nostril route once daily.  Dispense: 16 g; Refill: 3    Mild intermittent asthma without complication  -     montelukast (SINGULAIR) 10 mg tablet; Take 1 tablet (10 mg total) by mouth every evening.  Dispense: 30 tablet; Refill: 5      Please drink plenty of fluids. You body needs increased water but other beverages may aid in comfort.  You will know that you have had enough water to be hydrated when your urine is clear or at least a very pale yellow. Nasal saline may help with removal of mucus as well.  Ibuprofen is preferred for aches and pains as well as fever reduction. If you do not have high blood pressure, then you may use a decongestant such as pseudoephedrine or one of the above medications that have the letter, "-D" following it.  Hot tea with honey can help with sore throats as the heat with reduce the inflammation and the honey will coat your throat to help it feel better. Prescription pain medicine should be used for the significant throat sxs you are experiencing. Do not drive after taking this medication.     Lastly, good hand washing and cough " hygiene (cough into your elbow) will help prevent the spread of the illness.  A general rule is that you are no longer contagious once you have been without a fever for over 24 hours without requiring fever reducing medications.   Please get plenty of rest.     Please return here or go to the Emergency Department for any concerns or worsening of condition.     Please take an over the counter antihistamine medication (allegra/Claritin/Zyrtec) of your choice as directed, they may help with some of the runny nose symptoms if you are having them.       Can continue mucinex. Use mucinex (guaifenisin) to break up mucous up to 2400mg/day to loosen any mucous. The mucinex DM pill has a cough suppressant that can be used at night to stop the tickle at the back of your throat. You may use Mucinex to help thin thick secretions to allow you to expel them but it only works if you drink more water.     If not allergic, please take over the counter Tylenol (Acetaminophen) and/or Motrin (Ibuprofen) as directed for control of pain and/or fever.  Please follow up with your primary care doctor or specialist as needed.     Sore throat recommendations: Warm fluids, warm salt water gargles, throat lozenges, tea, honey, soup, rest, hydration.     Use over the counter flonase: one spray each nostril twice daily OR two sprays each nostril once daily.      Sinus rinses DO NOT USE TAP WATER, if you must, water must be a rolling boil for 1 minute, let it cool, then use.  May use distilled water, or over the counter nasal saline rinses.  Vics vapor rub in shower to help open nasal passages.  May use nasal gel to keep passages moisturized.  May use Nasal saline sprays during the day for added relief of congestion.   For those who go to the gym, please do not use the sauna or steam room now to clear sinuses.     If you  smoke, please stop smoking.        Please return or see your primary care doctor if you develop new or worsening symptoms.       Please arrange follow up with your primary medical clinic as soon as possible. You must understand that you've received an Urgent Care treatment only and that you may be released before all of your medical problems are known or treated. You, the patient, will arrange for follow up as instructed. If your symptoms worsen or fail to improve you should go to the Emergency Room.        You must understand that you've received an Urgent Care treatment only and that you may be released before all your medical problems are known or treated. You, the patient, will arrange for follow up care as instructed.  Follow up with your PCP or specialty clinic as directed in the next 1-2 weeks if not improved or as needed.  You can call [phone] to schedule an appointment with the appropriate provider.  If your condition worsens we recommend that you receive another evaluation at the emergency room immediately or contact your primary medical clinics after hours call service to discuss your concerns.  Please return here or go to the Emergency Department for any concerns or worsening of condition.    If you were prescribed a narcotic or controlled medication, do not drive or operate heavy equipment or machinery while taking these medications.    Thank you for choosing [facility]!    Our goal in the Urgent Care is to always provide outstanding medical care. You may receive a survey by mail or e-mail in the next week regarding your experience today. We would greatly appreciate you completing and returning the survey. Your feedback provides us with a way to recognize our staff who provide very good care, and it helps us learn how to improve when your experience was below our aspiration of excellence.      We appreciate you trusting us with your medical care. We hope you feel better soon. We will be happy to take care of you for all of your future medical needs.   This note was prepared using voice-recognition software.   Although efforts are made to proofread the note, some errors may persist in the final document.     Sincerely,    Chase Hurst DNP, FNP-C

## 2024-06-03 NOTE — TELEPHONE ENCOUNTER
Contacted pt by phone.  States that she has been on ocp since December.  She is currently on the last week of pills in this pack.  Per pt sometimes her pack has 4 days of sugar pills and sometime it has 7days .  Pt did go to urgent care earlier today and was told to contact clinic.  Pt states urgent care told her to increase fluid intake and she feels that her bleeding did slack off some since she has been drinking more.  Bleeding precautions reviewed with pt.  Instructed pt it would be best if she comes in to clinic to discuss with a provider.  Pt is scheduled on 06/18/2024

## 2024-06-03 NOTE — PATIENT INSTRUCTIONS
"Patient Instructions   PLEASE READ YOUR DISCHARGE INSTRUCTIONS ENTIRELY AS IT CONTAINS IMPORTANT INFORMATION.        Please drink plenty of fluids. You body needs increased water but other beverages may aid in comfort.  You will know that you have had enough water to be hydrated when your urine is clear or at least a very pale yellow. Nasal saline may help with removal of mucus as well.  Ibuprofen is preferred for aches and pains as well as fever reduction. If you do not have high blood pressure, then you may use a decongestant such as pseudoephedrine or one of the above medications that have the letter, "-D" following it.  Hot tea with honey can help with sore throats as the heat with reduce the inflammation and the honey will coat your throat to help it feel better. Prescription pain medicine should be used for the significant throat sxs you are experiencing. Do not drive after taking this medication.     Lastly, good hand washing and cough hygiene (cough into your elbow) will help prevent the spread of the illness.  A general rule is that you are no longer contagious once you have been without a fever for over 24 hours without requiring fever reducing medications.   Please get plenty of rest.     Please return here or go to the Emergency Department for any concerns or worsening of condition.     Please take an over the counter antihistamine medication (allegra/Claritin/Zyrtec) of your choice as directed, they may help with some of the runny nose symptoms if you are having them.       Can continue mucinex. Use mucinex (guaifenisin) to break up mucous up to 2400mg/day to loosen any mucous. The mucinex DM pill has a cough suppressant that can be used at night to stop the tickle at the back of your throat. You may use Mucinex to help thin thick secretions to allow you to expel them but it only works if you drink more water.     If not allergic, please take over the counter Tylenol (Acetaminophen) and/or Motrin " (Ibuprofen) as directed for control of pain and/or fever.  Please follow up with your primary care doctor or specialist as needed.     Sore throat recommendations: Warm fluids, warm salt water gargles, throat lozenges, tea, honey, soup, rest, hydration.     Use over the counter flonase: one spray each nostril twice daily OR two sprays each nostril once daily.      Sinus rinses DO NOT USE TAP WATER, if you must, water must be a rolling boil for 1 minute, let it cool, then use.  May use distilled water, or over the counter nasal saline rinses.  Vics vapor rub in shower to help open nasal passages.  May use nasal gel to keep passages moisturized.  May use Nasal saline sprays during the day for added relief of congestion.   For those who go to the gym, please do not use the sauna or steam room now to clear sinuses.     If you  smoke, please stop smoking.        Please return or see your primary care doctor if you develop new or worsening symptoms.      Please arrange follow up with your primary medical clinic as soon as possible. You must understand that you've received an Urgent Care treatment only and that you may be released before all of your medical problems are known or treated. You, the patient, will arrange for follow up as instructed. If your symptoms worsen or fail to improve you should go to the Emergency Room.        You must understand that you've received an Urgent Care treatment only and that you may be released before all your medical problems are known or treated. You, the patient, will arrange for follow up care as instructed.  Follow up with your PCP or specialty clinic as directed in the next 1-2 weeks if not improved or as needed.  You can call [phone] to schedule an appointment with the appropriate provider.  If your condition worsens we recommend that you receive another evaluation at the emergency room immediately or contact your primary medical clinics after hours call service to discuss  your concerns.  Please return here or go to the Emergency Department for any concerns or worsening of condition.    If you were prescribed a narcotic or controlled medication, do not drive or operate heavy equipment or machinery while taking these medications.    Thank you for choosing [facility]!    Our goal in the Urgent Care is to always provide outstanding medical care. You may receive a survey by mail or e-mail in the next week regarding your experience today. We would greatly appreciate you completing and returning the survey. Your feedback provides us with a way to recognize our staff who provide very good care, and it helps us learn how to improve when your experience was below our aspiration of excellence.      We appreciate you trusting us with your medical care. We hope you feel better soon. We will be happy to take care of you for all of your future medical needs.   This note was prepared using voice-recognition software.  Although efforts are made to proofread the note, some errors may persist in the final document.     Sincerely,    Chase Hurst DNP, FNP-C

## 2024-06-25 ENCOUNTER — PATIENT MESSAGE (OUTPATIENT)
Dept: INTERNAL MEDICINE | Facility: CLINIC | Age: 24
End: 2024-06-25
Payer: MEDICAID

## 2024-08-14 DIAGNOSIS — Z30.011 ENCOUNTER FOR INITIAL PRESCRIPTION OF CONTRACEPTIVE PILLS: ICD-10-CM

## 2024-08-14 RX ORDER — NORETHINDRONE ACETATE/ETHINYL ESTRADIOL AND FERROUS FUMARATE 1MG-20(24)
1 KIT ORAL
Qty: 84 TABLET | Refills: 0 | Status: SHIPPED | OUTPATIENT
Start: 2024-08-14

## 2024-08-14 NOTE — TELEPHONE ENCOUNTER
Refill Routing Note   Medication(s) are not appropriate for processing by [facility] for the following reason(s):        Drug-disease interaction    [facility] action(s):  Defer      Medication Therapy Plan: hyperlipidemia- worsening labs      Appointments  past 12m or future 3m with PCP    Date Provider   Last Visit   8/30/2023 Jan Clay MD   Next Visit   Visit date not found Jan Clay MD   ED visits in past 90 days: 0        Note composed:1:42 PM 08/14/2024

## 2024-09-04 ENCOUNTER — OFFICE VISIT (OUTPATIENT)
Dept: URGENT CARE | Facility: CLINIC | Age: 24
End: 2024-09-04
Payer: COMMERCIAL

## 2024-09-04 VITALS
BODY MASS INDEX: 36.65 KG/M2 | HEART RATE: 74 BPM | DIASTOLIC BLOOD PRESSURE: 82 MMHG | OXYGEN SATURATION: 99 % | TEMPERATURE: 98 F | SYSTOLIC BLOOD PRESSURE: 125 MMHG | HEIGHT: 65 IN | WEIGHT: 220 LBS | RESPIRATION RATE: 18 BRPM

## 2024-09-04 DIAGNOSIS — J01.90 ACUTE RHINOSINUSITIS: Primary | ICD-10-CM

## 2024-09-04 LAB
CTP QC/QA: YES
SARS-COV-2 AG RESP QL IA.RAPID: NEGATIVE

## 2024-09-04 PROCEDURE — 87811 SARS-COV-2 COVID19 W/OPTIC: CPT | Mod: QW,S$GLB,, | Performed by: PHYSICIAN ASSISTANT

## 2024-09-04 PROCEDURE — 99213 OFFICE O/P EST LOW 20 MIN: CPT | Mod: S$GLB,,, | Performed by: PHYSICIAN ASSISTANT

## 2024-09-04 RX ORDER — FLUTICASONE PROPIONATE 50 MCG
2 SPRAY, SUSPENSION (ML) NASAL DAILY
Qty: 16 EACH | Refills: 0 | Status: SHIPPED | OUTPATIENT
Start: 2024-09-04 | End: 2024-09-18

## 2024-09-04 RX ORDER — PREDNISONE 20 MG/1
40 TABLET ORAL DAILY
Qty: 6 TABLET | Refills: 0 | Status: SHIPPED | OUTPATIENT
Start: 2024-09-04 | End: 2024-09-07

## 2024-09-04 NOTE — PATIENT INSTRUCTIONS
- Rest.    - Drink plenty of fluids.  - Viral upper respiratory infections typically run their course in 10-14 days.     - Tylenol (acetaminophen) or Ibuprofen as directed as needed for fever/pain. Avoid tylenol if you have a history of liver disease. Do not take ibuprofen if you have a history of GI bleeding, kidney disease, gastric surgery, or if you take blood thinners.   --------------------------  - you can take the prescribed loratadine-pseudoephedrine(Claritin-D) as directed.  If not covered by insurance, see below:  - You can take over-the-counter claritin, zyrtec, allegra, or xyzal as directed. These are antihistamines that can help with runny nose, nasal congestion, sneezing, and helps to dry up post-nasal drip, which usually causes sore throat and cough.   - If you do NOT have high blood pressure, you may use a decongestant form (D)  of this medication (ie. Claritin- D, zyrtec-D, allegra-D) or if you do not take the D form, you can take sudafed (pseudoephedrine) over the counter, which is a decongestant. Do NOT take two decongestant (D) medications at the same time (such as mucinex-D and claritin-D or plain sudafed and claritin D)  ---------------------------------------------  - You can use Flonase (fluticasone) nasal spray as directed for sinus congestion and postnasal drip. This is a steroid nasal spray that works locally over time to decrease the inflammation in your nose/sinuses and help with allergic symptoms. This is not an quick- relief spray like afrin, but it works well if used daily.  Discontinue if you develop nose bleed  - use OTC nasal saline prior to Flonase.  - you can use OTC nasal saline such as Ocean Spray Nasal Saline 1-3 puffs each nostril every 2-3 hours then blow out onto tissue. This is to irrigate the nasal passage way to clear the sinus openings. Use until sinus problem resolved.    - You received a steroid (prednisone) today.  This can elevate your blood pressure, elevate your  blood sugar, water weight gain, nervous energy, redness to the face and dimpling of the skin where the shot goes in.   - Do not use steroids more than 3 times per year.   - If you have diabetes, please check you blood sugar frequently.  - If you have high blood pressure, please check your blood pressure frequently.     - Follow up with your PCP or specialty clinic as directed in the next 1-2 weeks if not improved or as needed.  You can call [phone] to schedule an appointment with the appropriate provider.      - Go to the ER if you develop new or worsening symptoms.     - You must understand that you have received an Urgent Care treatment only and that you may be released before all of your medical problems are known or treated.   - You, the patient, will arrange for follow up care as instructed.   - If your condition worsens or fails to improve we recommend that you receive another evaluation at the ER immediately or contact your PCP to discuss your concerns or return here.

## 2024-09-04 NOTE — PROGRESS NOTES
"Subjective:      Patient ID: Aye Caceres is a 24 y.o. female.    Vitals:  height is 5' 5" (1.651 m) and weight is 99.8 kg (220 lb). Her oral temperature is 98 °F (36.7 °C). Her blood pressure is 125/82 and her pulse is 74. Her respiration is 18 and oxygen saturation is 99%.     Chief Complaint: Sinus Problem    Pt complain of nasal congestion that started 4 days ago.  Admits to associated ear pressure but denies any other new associated symptoms.  No sore throat, fever, cough.  Pt took sudafed, nyquil, dayquil, and mucin ex which gave no relief.    Sinus Problem  This is a new problem. Episode onset: 4 days ago. The problem has been gradually worsening since onset. There has been no fever. Her pain is at a severity of 0/10. She is experiencing no pain. Associated symptoms include congestion and sinus pressure. Pertinent negatives include no chills, coughing, diaphoresis, ear pain, headaches, hoarse voice, neck pain, shortness of breath, sneezing, sore throat or swollen glands. Treatments tried: sudafed, nyquil, dayquil, and mucin ex. The treatment provided no relief.       Constitution: Negative for chills, sweating, fatigue and fever.   HENT:  Positive for congestion and sinus pressure. Negative for ear pain, ear discharge and sore throat.    Neck: Negative for neck pain and neck stiffness.   Cardiovascular:  Negative for chest pain, leg swelling and palpitations.   Eyes:  Negative for eye itching, eye pain and eye redness.   Respiratory:  Negative for cough, sputum production and shortness of breath.    Gastrointestinal:  Negative for abdominal pain, nausea, vomiting and diarrhea.   Genitourinary:  Negative for dysuria, frequency and urgency.   Musculoskeletal:  Negative for pain and joint swelling.   Skin:  Negative for color change and rash.   Allergic/Immunologic: Negative for sneezing.   Neurological:  Negative for dizziness, headaches, disorientation, altered mental status, numbness and tingling. "   Psychiatric/Behavioral:  Negative for altered mental status and disorientation.       Objective:     Physical Exam   Constitutional: She is oriented to person, place, and time. She appears well-developed. She is cooperative.  Non-toxic appearance. She does not appear ill. No distress.   HENT:   Head: Normocephalic and atraumatic.   Ears:   Right Ear: Hearing, tympanic membrane, external ear and ear canal normal.   Left Ear: Hearing, tympanic membrane, external ear and ear canal normal.   Nose: Mucosal edema present. No rhinorrhea or nasal deformity. No epistaxis. Right sinus exhibits maxillary sinus tenderness. Right sinus exhibits no frontal sinus tenderness. Left sinus exhibits maxillary sinus tenderness. Left sinus exhibits no frontal sinus tenderness.   Mouth/Throat: Uvula is midline, oropharynx is clear and moist and mucous membranes are normal. No trismus in the jaw. Normal dentition. No uvula swelling. No oropharyngeal exudate, posterior oropharyngeal edema, posterior oropharyngeal erythema, tonsillar abscesses or cobblestoning. No tonsillar exudate.   Eyes: Conjunctivae and lids are normal. No scleral icterus.   Neck: Trachea normal and phonation normal. Neck supple. No edema present. No erythema present. No neck rigidity present.   Cardiovascular: Normal rate, regular rhythm, normal heart sounds and normal pulses.   Pulmonary/Chest: Effort normal and breath sounds normal. No accessory muscle usage or stridor. No tachypnea and no bradypnea. No respiratory distress. She has no decreased breath sounds. She has no wheezes. She has no rhonchi. She has no rales.   Abdominal: Normal appearance.   Musculoskeletal: Normal range of motion.         General: No deformity. Normal range of motion.   Lymphadenopathy:     She has no cervical adenopathy.   Neurological: She is alert and oriented to person, place, and time. She exhibits normal muscle tone. Coordination normal.   Skin: Skin is warm, dry, intact, not  diaphoretic and not pale.   Psychiatric: Her speech is normal and behavior is normal. Judgment and thought content normal.   Nursing note and vitals reviewed.      Results for orders placed or performed in visit on 09/04/24   SARS Coronavirus 2 Antigen, POCT Manual Read   Result Value Ref Range    SARS Coronavirus 2 Antigen Negative Negative     Acceptable Yes        Assessment:     1. Acute rhinosinusitis        Plan:       Acute rhinosinusitis  -     SARS Coronavirus 2 Antigen, POCT Manual Read  -     loratadine-pseudoephedrine 5-120 mg (CLARITIN-D 12-HOUR) 5-120 mg per tablet; Take 1 tablet by mouth 2 (two) times daily. for 10 days  Dispense: 20 tablet; Refill: 0  -     fluticasone propionate (FLONASE) 50 mcg/actuation nasal spray; 2 sprays (100 mcg total) by Each Nostril route once daily. for 14 days  Dispense: 16 each; Refill: 0  -     predniSONE (DELTASONE) 20 MG tablet; Take 2 tablets (40 mg total) by mouth once daily. for 3 days  Dispense: 6 tablet; Refill: 0    Discussed ddx, home care, tx options, and given follow up precautions.  I have reviewed the patient's chart to view previous visits, labs, and imaging to assess PMH and look for any trends or previous treatments.

## 2024-09-10 ENCOUNTER — PATIENT MESSAGE (OUTPATIENT)
Dept: INTERNAL MEDICINE | Facility: CLINIC | Age: 24
End: 2024-09-10
Payer: COMMERCIAL

## 2024-09-27 ENCOUNTER — PATIENT MESSAGE (OUTPATIENT)
Dept: OBSTETRICS AND GYNECOLOGY | Facility: CLINIC | Age: 24
End: 2024-09-27
Payer: COMMERCIAL

## 2024-09-27 DIAGNOSIS — Z30.41 ENCOUNTER FOR SURVEILLANCE OF CONTRACEPTIVE PILLS: Primary | ICD-10-CM

## 2024-09-27 NOTE — TELEPHONE ENCOUNTER
Pt currently on Pierre 24 Fe 1-20 (24) and has complaints of heavy menses with clots and pain with this birth control pill. Pt stated she is on pack #4, but stated she has been on Ocps since discontinuing depo. Last depo was given 7/21/23. Please advise. Thank you.

## 2024-09-30 RX ORDER — NORGESTIMATE AND ETHINYL ESTRADIOL 0.25-0.035
1 KIT ORAL DAILY
Qty: 28 TABLET | Refills: 11 | Status: SHIPPED | OUTPATIENT
Start: 2024-09-30 | End: 2025-09-30

## 2024-10-08 ENCOUNTER — PATIENT MESSAGE (OUTPATIENT)
Dept: INTERNAL MEDICINE | Facility: CLINIC | Age: 24
End: 2024-10-08
Payer: COMMERCIAL

## 2024-10-30 ENCOUNTER — OFFICE VISIT (OUTPATIENT)
Dept: INTERNAL MEDICINE | Facility: CLINIC | Age: 24
End: 2024-10-30
Payer: COMMERCIAL

## 2024-10-30 ENCOUNTER — PATIENT MESSAGE (OUTPATIENT)
Dept: INTERNAL MEDICINE | Facility: CLINIC | Age: 24
End: 2024-10-30

## 2024-10-30 VITALS
BODY MASS INDEX: 40.3 KG/M2 | RESPIRATION RATE: 18 BRPM | DIASTOLIC BLOOD PRESSURE: 88 MMHG | SYSTOLIC BLOOD PRESSURE: 126 MMHG | HEIGHT: 65 IN | OXYGEN SATURATION: 100 % | HEART RATE: 72 BPM | WEIGHT: 241.88 LBS

## 2024-10-30 DIAGNOSIS — E66.813 CLASS 3 DRUG-INDUCED OBESITY WITH SERIOUS COMORBIDITY AND BODY MASS INDEX (BMI) OF 40.0 TO 44.9 IN ADULT: ICD-10-CM

## 2024-10-30 DIAGNOSIS — E66.1 CLASS 3 DRUG-INDUCED OBESITY WITH SERIOUS COMORBIDITY AND BODY MASS INDEX (BMI) OF 40.0 TO 44.9 IN ADULT: ICD-10-CM

## 2024-10-30 DIAGNOSIS — E66.01 MORBID OBESITY WITH BMI OF 40.0-44.9, ADULT: Primary | ICD-10-CM

## 2024-10-30 DIAGNOSIS — L30.9 DERMATITIS: ICD-10-CM

## 2024-10-30 DIAGNOSIS — F33.0 MILD EPISODE OF RECURRENT MAJOR DEPRESSIVE DISORDER: Primary | ICD-10-CM

## 2024-10-30 PROCEDURE — 3079F DIAST BP 80-89 MM HG: CPT | Mod: CPTII,S$GLB,, | Performed by: NURSE PRACTITIONER

## 2024-10-30 PROCEDURE — 99999 PR PBB SHADOW E&M-EST. PATIENT-LVL IV: CPT | Mod: PBBFAC,,, | Performed by: NURSE PRACTITIONER

## 2024-10-30 PROCEDURE — 1159F MED LIST DOCD IN RCRD: CPT | Mod: CPTII,S$GLB,, | Performed by: NURSE PRACTITIONER

## 2024-10-30 PROCEDURE — 3008F BODY MASS INDEX DOCD: CPT | Mod: CPTII,S$GLB,, | Performed by: NURSE PRACTITIONER

## 2024-10-30 PROCEDURE — 3074F SYST BP LT 130 MM HG: CPT | Mod: CPTII,S$GLB,, | Performed by: NURSE PRACTITIONER

## 2024-10-30 PROCEDURE — 99214 OFFICE O/P EST MOD 30 MIN: CPT | Mod: S$GLB,,, | Performed by: NURSE PRACTITIONER

## 2024-10-30 RX ORDER — VENLAFAXINE HYDROCHLORIDE 75 MG/1
75 CAPSULE, EXTENDED RELEASE ORAL DAILY
Qty: 30 CAPSULE | Refills: 1 | Status: SHIPPED | OUTPATIENT
Start: 2024-10-30 | End: 2025-10-30

## 2024-10-30 RX ORDER — NYSTATIN 100000 [USP'U]/G
POWDER TOPICAL 2 TIMES DAILY
Qty: 120 G | Refills: 0 | Status: SHIPPED | OUTPATIENT
Start: 2024-10-30

## 2024-10-30 RX ORDER — SEMAGLUTIDE 0.25 MG/.5ML
0.25 INJECTION, SOLUTION SUBCUTANEOUS
Qty: 2 ML | Refills: 0 | Status: SHIPPED | OUTPATIENT
Start: 2024-10-30

## 2024-11-04 RX ORDER — ORAL SEMAGLUTIDE 3 MG/1
3 TABLET ORAL DAILY
Qty: 30 TABLET | Refills: 1 | Status: SHIPPED | OUTPATIENT
Start: 2024-11-04

## 2024-11-05 ENCOUNTER — TELEPHONE (OUTPATIENT)
Dept: BARIATRICS | Facility: CLINIC | Age: 24
End: 2024-11-05
Payer: COMMERCIAL

## 2024-11-11 ENCOUNTER — TELEPHONE (OUTPATIENT)
Dept: BARIATRICS | Facility: CLINIC | Age: 24
End: 2024-11-11
Payer: COMMERCIAL

## 2024-11-20 ENCOUNTER — PATIENT OUTREACH (OUTPATIENT)
Dept: ADMINISTRATIVE | Facility: HOSPITAL | Age: 24
End: 2024-11-20
Payer: COMMERCIAL

## 2024-11-20 NOTE — PROGRESS NOTES
Care Coordination Encounter Details:       MyChart Portal Status:         [x]  Reviewed MyChart Portal Status offered / enrolled if applicable        Additional Notes:     MyChart Outcomes: Pt is enrolled & active          Updates Requested / Reviewed:        Updated Care Coordination Note, Care Everywhere, , External Sources: [facility] and [facility], Care Team Updated, Removed  or Duplicate Orders, and Immunizations Reconciliation Completed or Queried: [place]         Health Maintenance Screening(s) Due:      Health Maintenance Topics Overdue:      VBHM Score: 0     Patient is not due for any topics at this time.                       Health Maintenance Topic(s) Outreach Outcomes & Actions Taken:    Medication Adherence / Statins - Outreach Outcomes & Actions Taken  : pt is compliant but not able to get the weight loss meds.    Primary Care Appt - Outreach Outcomes & Actions Taken  : Primary Care Appt Scheduled    Additional Notes:             Chronic Disease Management:     Diabetes Measures        Lab Results   Component Value Date    HGBA1C 5.5 2023           [x]  Reviewed chart for active Diabetes diagnosis     []  Scheduled necessary follow up appointments if needed         Additional Notes:             Hypertension Measures        BP Readings from Last 1 Encounters:   10/30/24 126/88           [x]  Reviewed chart for active Hypertension diagnosis     []  Reviewed & documented Home BP Cuff     []  Documented a Remote BP if needed & applicable     []  Scheduled necessary follow up appointments with Primary Care if needed         Additional Notes:             Provider Team Continuity:     Last PCP Visit Date: 10/30/2024          [x]  Reviewed Primary Care Provider Visits, Annual Wellness Visit, and Future          Appointments to ensure appointments have been scheduled and/or           completed        Additional Notes:             Social Determinants of Health          [x]  Reviewed,  completed, and/or updated the following sections:                  Food Insecurity, Transportation Needs, Financial Resource Strain,                 Tobacco Use        Additional Notes:             Care Management, Digital Medicine, and/or Education Referrals    OPCM Risk Score: 47.7         Next Steps - Referral Actions: no referral required        Additional Notes:

## 2024-12-11 ENCOUNTER — PATIENT MESSAGE (OUTPATIENT)
Dept: OBSTETRICS AND GYNECOLOGY | Facility: CLINIC | Age: 24
End: 2024-12-11
Payer: COMMERCIAL

## 2024-12-11 ENCOUNTER — OFFICE VISIT (OUTPATIENT)
Dept: INTERNAL MEDICINE | Facility: CLINIC | Age: 24
End: 2024-12-11
Payer: COMMERCIAL

## 2024-12-11 VITALS
WEIGHT: 242.5 LBS | OXYGEN SATURATION: 98 % | SYSTOLIC BLOOD PRESSURE: 120 MMHG | HEIGHT: 65 IN | HEART RATE: 88 BPM | BODY MASS INDEX: 40.4 KG/M2 | DIASTOLIC BLOOD PRESSURE: 84 MMHG | RESPIRATION RATE: 18 BRPM

## 2024-12-11 DIAGNOSIS — F32.A DEPRESSION, UNSPECIFIED DEPRESSION TYPE: Primary | ICD-10-CM

## 2024-12-11 DIAGNOSIS — E66.01 MORBID OBESITY WITH BMI OF 40.0-44.9, ADULT: ICD-10-CM

## 2024-12-11 PROCEDURE — 99999 PR PBB SHADOW E&M-EST. PATIENT-LVL III: CPT | Mod: PBBFAC,,, | Performed by: NURSE PRACTITIONER

## 2024-12-11 RX ORDER — SEMAGLUTIDE 0.5 MG/.5ML
0.5 INJECTION, SOLUTION SUBCUTANEOUS
Qty: 2 ML | Refills: 0 | Status: SHIPPED | OUTPATIENT
Start: 2024-12-11

## 2024-12-11 NOTE — LETTER
December 11, 2024      [facility]  [address] [place] [id number]  Phone: [phone]  Fax: [phone]       Patient: Aye Caceres   YOB: 2000  Date of Visit: 12/11/2024    To Whom It May Concern:    Tamia Caceres  was at [facility] on 12/11/2024. The patient may return to work/school on 12/11/2024 with no restrictions. If you have any questions or concerns, or if I can be of further assistance, please do not hesitate to contact me.    Sincerely,          Evelina Mtz LPN

## 2024-12-11 NOTE — PROGRESS NOTES
"Subjective:       Patient ID: Aye Caceres is a 24 y.o. female.    Chief Complaint: Follow-up (6 weeks), Depression, and Anxiety      History of Present Illness    CHIEF COMPLAINT:  Aye presents today for a six-week follow-up and concerns about weight gain.    WEIGHT MANAGEMENT:  She reports a 22-pound weight gain since October, which she attributes to switching to Orthocycline birth control. She denies fluid retention or puffiness. She expresses frustration with difficulty losing weight despite following a strict keto diet, eliminating soft drinks, bread, rice, and other carbohydrates. She continues to gain weight despite these efforts. She previously tried Contrave for weight management but reports it was ineffective.    MENTAL HEALTH:  She reports improvement in depression symptoms with Effexor, with her depression score decreasing to 2. However, she continues to experience anxiety, which fluctuates daily. Her anxiety score remains elevated at 9, indicating mild to moderate anxiousness. She describes persistent irritability, easily triggered by minor events.    SLEEP:  She reports no sleep disturbances, denying difficulty falling asleep, staying asleep, or experiencing nighttime awakenings. She states her sleep is "fine" and confirms that anxiety or irritability is not affecting her sleep quality.    MEDICATIONS:  She is currently taking Effexor for depression and uses albuterol as needed, particularly with changes in weather.    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      ROS:  Constitutional: -weight loss, +weight gain  Psychiatric: +depression, +anxiety, -sleep difficulty          Objective:      Physical Exam    Vitals: Weight: 242 lbs.  General: No acute distress. Well-developed. Well-nourished.  Eyes: EOMI. Sclerae anicteric.  HENT: Normocephalic. Atraumatic. Nares patent. Moist oral mucosa.  Ears: Bilateral TMs clear. Bilateral EACs " clear.  Cardiovascular: Regular rate. Regular rhythm. No murmurs. No rubs. No gallops. Normal S1, S2.  Respiratory: Normal respiratory effort. Clear to auscultation bilaterally. No rales. No rhonchi. No wheezing.  Abdomen: Soft. Non-tender. Non-distended. Normoactive bowel sounds.  Musculoskeletal: No  obvious deformity.  Extremities: No lower extremity edema.  Neurological: Alert & oriented x3. No slurred speech. Normal gait.  Psychiatric: Normal mood. Normal affect. Good insight. Good judgment.  Skin: Warm. Dry. No rash.          Assessment:       1. Depression, unspecified depression type    2. Morbid obesity with BMI of 40.0-44.9, adult        Plan:     Problem List Items Addressed This Visit       Morbid obesity with BMI of 40.0-44.9, adult    Relevant Medications    semaglutide, weight loss, (WEGOVY) 0.5 mg/0.5 mL PnIj     Other Visit Diagnoses       Depression, unspecified depression type    -  Primary    Relevant Medications    semaglutide, weight loss, (WEGOVY) 0.5 mg/0.5 mL PnIj          Assessment & Plan    IMPRESSION:  - Assessed weight gain of 22 lbs since October, likely attributed to birth control pill change  - Evaluated depression and anxiety scores; depression improved (score decreased from 9 to 2), anxiety remained mild to moderate (score changed from 10 to 9)  - Considered increasing Effexor dose for anxiety but decided against it due to improved depression and potential side effects  - Ruled out diabetes and thyroid issues as causes for weight gain based on previous lab results  - Determined patient ineligible for semaglutide due to normal blood sugar  - Will attempt prescribing Wegovy for weight management, acknowledging potential insurance coverage issues    WEIGHT MANAGEMENT:  - Started process to prescribe Wegovy for weight management, pending insurance approval.    DEPRESSION:  - Continued Effexor at current dose for depression management.    RESPIRATORY SYMPTOMS:  - Continued albuterol as  needed for respiratory symptoms.    OTHER:  - Continued current birth control pill (Orthocycline).          This note was generated with the assistance of ambient listening technology. Verbal consent was obtained by the patient and accompanying visitor(s) for the recording of patient appointment to facilitate this note. I attest to having reviewed and edited the generated note for accuracy, though some syntax or spelling errors may persist. Please contact the author of this note for any clarification.       Will try wegovy 0.25 x 1 month and try to get this approved- she is dieting and exercising. Gained 22# in 2 months and can not get it off. No change to anxiety depression meds- weight started prior to effexor.       10/30/2024     9:46 AM 12/11/2024     9:27 AM   IVY-7   Was test performed? Yes Yes   1. Feeling nervous, anxious, or on edge? More than half the days Several days   2. Not being able to stop or control worrying? Several days More than half the days   3. Worrying too much about different things? More than half the days More than half the days   4. Trouble relaxing? More than half the days More than half the days   5. Being so restless that it is hard to sit still? Not at all Not at all   6. Becoming easily annoyed or irritable? More than half the days More than half the days   7. Feeling afraid as if something awful might happen? Several days Not at all   8. If you checked off any problems, how difficult have these problems made it for you to do your work, take care of things at home, or get along with other people? Somewhat difficult Somewhat difficult   IVY-7 Score 10 9   Number answered (out of first 7) 7 7   Interpretation Moderate Anxiety Mild Anxiety         12/11/2024     9:26 AM 10/30/2024     9:43 AM 11/22/2023     2:04 PM 6/21/2022    12:56 PM   Depression Patient Health Questionnaire   Over the last two weeks how often have you been bothered by little interest or pleasure in doing things  Several days Several days Nearly every day Not at all   Over the last two weeks how often have you been bothered by feeling down, depressed or hopeless Several days More than half the days Nearly every day Not at all   PHQ-2 Total Score 2 3 6 0   Over the last two weeks how often have you been bothered by trouble falling or staying asleep, or sleeping too much  Several days Not at all    Over the last two weeks how often have you been bothered by feeling tired or having little energy  More than half the days More than half the days    Over the last two weeks how often have you been bothered by a poor appetite or overeating  Several days More than half the days    Over the last two weeks how often have you been bothered by feeling bad about yourself - or that you are a failure or have let yourself or your family down  More than half the days Nearly every day    Over the last two weeks how often have you been bothered by trouble concentrating on things, such as reading the newspaper or watching television  Not at all Not at all    Over the last two weeks how often have you been bothered by moving or speaking so slowly that other people could have noticed. Or the opposite - being so fidgety or restless that you have been moving around a lot more than usual.  Not at all Not at all    Over the last two weeks how often have you been bothered by thoughts that you would be better off dead, or of hurting yourself  Not at all Several days    If you checked off any problems, how difficult have these problems made it for you to do your work, take care of things at home or get along with other people?  Very difficult Very difficult    PHQ-9 Score  9 14    PHQ-9 Interpretation  Mild Moderate

## 2024-12-17 ENCOUNTER — PATIENT MESSAGE (OUTPATIENT)
Dept: INTERNAL MEDICINE | Facility: CLINIC | Age: 24
End: 2024-12-17
Payer: COMMERCIAL

## 2024-12-24 DIAGNOSIS — F33.0 MILD EPISODE OF RECURRENT MAJOR DEPRESSIVE DISORDER: ICD-10-CM

## 2024-12-24 RX ORDER — VENLAFAXINE HYDROCHLORIDE 75 MG/1
75 CAPSULE, EXTENDED RELEASE ORAL
Qty: 30 CAPSULE | Refills: 5 | Status: SHIPPED | OUTPATIENT
Start: 2024-12-24

## 2025-02-10 DIAGNOSIS — R52 PAIN: Primary | ICD-10-CM

## 2025-02-11 ENCOUNTER — TELEPHONE (OUTPATIENT)
Dept: ORTHOPEDICS | Facility: CLINIC | Age: 25
End: 2025-02-11
Payer: COMMERCIAL

## 2025-02-11 DIAGNOSIS — R52 PAIN: Primary | ICD-10-CM

## 2025-02-11 NOTE — TELEPHONE ENCOUNTER
Orders for the right knee for xray are put in. Pt can get these xray's prior to appointment at any [facility].         Tiago Chan Staff  Caller: Unspecified (Today,  8:59 AM)  Orders request  Who called: pt  Request of orders for: xray of right knee  Reason for request: pt said the previous orders that were called in were for her left knee but that she is coming in for her left knee  Call back number: [phone]

## 2025-02-11 NOTE — TELEPHONE ENCOUNTER
----- Message from Tiago sent at 2/11/2025  9:01 AM CST -----  Pt Note to Doctor    Who called: pt  Best call back #:  [phone]  Note:   pt said she is coming in for symptoms of her left knee, not right knee    (New Orders request message sent)

## 2025-02-14 NOTE — PROGRESS NOTES
Subjective:      Patient ID: Aye Caceres is a 24 y.o. female.    Chief Complaint: Pain of the Right Knee      HPI: Aye Caceres is a 24 y.o. female who presents to clinic for constant right knee pain. The patient denies known ALISA.  The pain started 3 weeks ago and is becoming progressively worse. . Pain is located superiorly. She reports that the pain is a 8/10 sharp pain today and at its worst.  The pain is aggravated by bending, prolonged walking, and standing for prolonged periods of time.  Associated symptoms include swelling, popping, catching, weakness. Denies numbness, tingling, radiation. The pain is affecting ADLs and limiting desired level of activity. There is not a history of previous surgery to the knee.      Previous treatments include Tylenol, NSAIDs, and heating pads which have provided minimal relief.     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  History of DVT/PE: Negative    PAST MEDICAL HISTORY:    Past Medical History:   Diagnosis Date    Asthma      PAST SURGICAL HISTORY:    Past Surgical History:   Procedure Laterality Date     SECTION       SECTION N/A 2022    Procedure: REPEAT  SECTION;  Surgeon: Jan Clay MD;  Location: [facility];  Service: OB/GYN;  Laterality: N/A;    GALLBLADDER SURGERY  2023     FAMILY HISTORY:    Family History   Problem Relation Name Age of Onset    Diabetes Mother      No Known Problems Father      Hypertension Maternal Grandmother      Diabetes Maternal Grandmother      Hypertension Maternal Grandfather      Diabetes Maternal Grandfather      Breast cancer Neg Hx      Colon cancer Neg Hx      Ovarian cancer Neg Hx       SOCIAL HISTORY:    Social History     Occupational History    Not on file   Tobacco Use    Smoking status: Never     Passive exposure: Never    Smokeless tobacco: Never   Substance and Sexual Activity    Alcohol use: No    Drug  use: No    Sexual activity: Yes     Partners: Male     Birth control/protection: Injection     Comment: single       MEDICATIONS:   Current Outpatient Medications:     albuterol 2.5 mg /3 mL (0.083 %) Nebu 3 mL, albuterol 5 mg/mL Nebu 0.5 mL, Inhale into the lungs., Disp: , Rfl:     fluticasone propionate (FLONASE) 50 mcg/actuation nasal spray, 1 spray (50 mcg total) by Each Nostril route once daily., Disp: 16 g, Rfl: 3    loratadine (CLARITIN) 10 mg tablet, Take 1 tablet (10 mg total) by mouth once daily., Disp: 30 tablet, Rfl: 5    montelukast (SINGULAIR) 10 mg tablet, Take 1 tablet (10 mg total) by mouth every evening., Disp: 30 tablet, Rfl: 5    naproxen (NAPROSYN) 500 MG tablet, Take 1 tablet (500 mg total) by mouth 2 (two) times daily with meals., Disp: 60 tablet, Rfl: 1    norgestimate-ethinyl estradioL (ORTHO-CYCLEN) 0.25-35 mg-mcg per tablet, Take 1 tablet by mouth once daily., Disp: 28 tablet, Rfl: 11    nystatin (MYCOSTATIN) powder, Apply topically 2 (two) times daily., Disp: 120 g, Rfl: 0    omeprazole (PRILOSEC) 40 MG capsule, Take 1 capsule (40 mg total) by mouth every morning. for 30 doses, Disp: 30 capsule, Rfl: 5    ondansetron (ZOFRAN) 4 MG tablet, Take 4 mg by mouth every 8 (eight) hours as needed., Disp: , Rfl:     semaglutide, weight loss, (WEGOVY) 0.5 mg/0.5 mL PnIj, Inject 0.5 mg into the skin every 7 days., Disp: 2 mL, Rfl: 0    venlafaxine (EFFEXOR-XR) 75 MG 24 hr capsule, Take 1 capsule by mouth once daily, Disp: 30 capsule, Rfl: 5    ALLERGIES:   Review of patient's allergies indicates:  No Known Allergies    Review of Systems:  Constitution: Negative for chills, fever and night sweats.   HENT: Negative for congestion and headaches.    Eyes: Negative for blurred vision or vision loss.  Cardiovascular: Negative for chest pain and syncope.   Respiratory: Negative for cough and shortness of breath.    Endocrine: Negative for polydipsia, polyphagia and polyuria.   Hematologic/Lymphatic:  Negative for bleeding problem. Does not bruise/bleed easily.   Skin: Negative for dry skin, itching and rash.   Musculoskeletal: See HPI.   Gastrointestinal: Negative for abdominal pain and bowel incontinence.   Genitourinary: Negative for bladder incontinence and nocturia.   Neurological: Negative for disturbances in coordination, loss of balance and seizures.   Psychiatric/Behavioral: Negative for depression. The patient does not have insomnia.    Allergic/Immunologic: Negative for hives and persistent infections.          Objective:        There were no vitals filed for this visit.    PHYSICAL EXAM:  General: Alert & oriented x3, well-developed and well-nourished, in no acute distress, sitting comfortably in the exam room.  Skin: Warm and dry. Capillary refill less than 2 seconds.   Head: Normocephalic and atraumatic.   Eyes: Sclera appear normal.   Nose: No deformities seen.   Ears: No deformities seen.   Neck: No tracheal deviation present.   Pulmonary/Chest: Breathing unlabored.   Neurological: Alert and oriented to person, place, and time.   Psychiatric: Mood is pleasant and affect appropriate.     RIGHT KNEE        Body habitus is  overweight .         The patient walks with a mild limp.        Hip irritability:  negative.         The skin over the knee is intact.        Knee effusion:  trace        Tenderness:  superior.        Range of Motion:  Flexion 135°, Extension 0° *pain at end ranges*        Ligament exam:   MCL:  intact   Lachman:  intact              Posterior sag:  intact    LCL:  intact        Patellar apprehension:  negative.        Popliteal cyst:  negative.        Patellar crepitation:  absent.        Pulses DP present, PT present.        Motor:  normal 5/5 strength in all tested muscle groups.         Sensory:  normal.    Imaging:   X-Rays: 4 views of bilateral knee dated 02/11/2025 , and independently reviewed, show: No acute fractures or dislocations. Joint spaces are preserved. No  evidence of foreign bodies.       Assessment:       1. Acute pain of right knee        Plan:       Orders Placed This Encounter    naproxen (NAPROSYN) 500 MG tablet     I explained the nature of the problem to the patient.     I discussed at length with the patient all the different treatment options available for her right knee including anti-inflammatories, acetaminophen, bracing, rest, ice, heat, physical therapy, and corticosteroid injections. I explained the potential role of surgery in the treatment of this condition. The patient understands that if non-surgical measures do not adequately control symptoms, surgery will be considered in the future.     Medications:  Prescription for Naproxen (Naprosyn) 500 mg BID provided today for PRN pain management. Patient understands to take with food and/or OTC prilosec to decrease GI side effects. Advised patient to refrain from taking other anti-inflammatory medications while taking this medication, however she may alternate with acetaminophen as needed.  HEP:  10576 - Brittny Lucero PA-C instructed and demonstrated a knee strengthening & ROM HEP. The patient then demonstrated understanding of exercises and proper technique. This program was performed for 15 minutes.   Physical Therapy:  Consider referral to physical therapy next visit.   DME:  Hinged knee brace provided today for assistance with ambulation and activities.    Pain Management: Ice compress to the affected area 2-3x a day for 15-20 minutes as needed for pain management.    Follow-Up: 6-8 weeks for follow-up.       All of the patient's questions were answered and the patient will contact us if they have any questions or concerns in the interim.      Brittny Lucero PA-C  [facility]  Orthopedic Surgery    Medical Dictation software was used during the dictation of portions or the entirety of this medical record.  Phonetic or grammatic errors may exist due to the use of this software. For  clarification, refer to the author of the document.

## 2025-02-17 ENCOUNTER — OFFICE VISIT (OUTPATIENT)
Dept: ORTHOPEDICS | Facility: CLINIC | Age: 25
End: 2025-02-17
Payer: COMMERCIAL

## 2025-02-17 DIAGNOSIS — M25.561 ACUTE PAIN OF RIGHT KNEE: Primary | ICD-10-CM

## 2025-02-17 RX ORDER — NAPROXEN 500 MG/1
500 TABLET ORAL 2 TIMES DAILY WITH MEALS
Qty: 60 TABLET | Refills: 1 | Status: SHIPPED | OUTPATIENT
Start: 2025-02-17

## 2025-02-20 ENCOUNTER — PATIENT OUTREACH (OUTPATIENT)
Dept: ADMINISTRATIVE | Facility: HOSPITAL | Age: 25
End: 2025-02-20
Payer: COMMERCIAL

## 2025-02-20 NOTE — PROGRESS NOTES
Care Coordination Encounter Details:       MyChart Portal Status:         [x]  Reviewed MyChart Portal Status offered / enrolled if applicable        Additional Notes:     MyChart Outcomes: Pt is enrolled & active          Updates Requested / Reviewed:        Updated Care Coordination Note, Care Everywhere, , External Sources: [facility], Care Team Updated, and Removed  or Duplicate Orders         Health Maintenance Screening(s) Due:      Health Maintenance Topics Overdue:      VBHM Score: 0     Patient is not due for any topics at this time.                       Health Maintenance Topic(s) Outreach Outcomes & Actions Taken:    Primary Care Appt - Outreach Outcomes & Actions Taken  : Patient Declined Scheduling or Will Call Back to Schedule      Chronic Disease Management:     Diabetes Measures        Lab Results   Component Value Date    HGBA1C 5.5 2023           [x]  Reviewed chart for active Diabetes diagnosis     []  Scheduled necessary follow up appointments if needed             Hypertension Measures        BP Readings from Last 1 Encounters:   24 128/75           [x]  Reviewed chart for active Hypertension diagnosis     []  Reviewed & documented Home BP Cuff     []  Documented a Remote BP if needed & applicable     []  Scheduled necessary follow up appointments with Primary Care if needed           Provider Team Continuity:     Last PCP Visit Date: 2024          [x]  Reviewed Primary Care Provider Visits, Annual Wellness Visit, and Future          Appointments to ensure appointments have been scheduled and/or           completed           Social Determinants of Health          [x]  Reviewed, completed, and/or updated the following sections:                  Food Insecurity, Transportation Needs, Financial Resource Strain,                 Tobacco Use           Care Management, Digital Medicine, and/or Education Referrals    OPCM Risk Score: 54.9

## 2025-04-01 ENCOUNTER — PATIENT MESSAGE (OUTPATIENT)
Dept: INTERNAL MEDICINE | Facility: CLINIC | Age: 25
End: 2025-04-01
Payer: COMMERCIAL

## 2025-04-01 DIAGNOSIS — R30.0 DYSURIA: Primary | ICD-10-CM

## 2025-04-03 ENCOUNTER — RESULTS FOLLOW-UP (OUTPATIENT)
Dept: INTERNAL MEDICINE | Facility: CLINIC | Age: 25
End: 2025-04-03

## 2025-04-03 DIAGNOSIS — N30.01 ACUTE CYSTITIS WITH HEMATURIA: Primary | ICD-10-CM

## 2025-04-03 RX ORDER — NITROFURANTOIN 25; 75 MG/1; MG/1
100 CAPSULE ORAL 2 TIMES DAILY
Qty: 10 CAPSULE | Refills: 0 | Status: SHIPPED | OUTPATIENT
Start: 2025-04-03 | End: 2025-05-07

## 2025-05-07 ENCOUNTER — OFFICE VISIT (OUTPATIENT)
Dept: OBSTETRICS AND GYNECOLOGY | Facility: CLINIC | Age: 25
End: 2025-05-07
Payer: COMMERCIAL

## 2025-05-07 ENCOUNTER — PATIENT MESSAGE (OUTPATIENT)
Dept: OBSTETRICS AND GYNECOLOGY | Facility: CLINIC | Age: 25
End: 2025-05-07

## 2025-05-07 VITALS
HEIGHT: 65 IN | WEIGHT: 239.19 LBS | DIASTOLIC BLOOD PRESSURE: 84 MMHG | BODY MASS INDEX: 39.85 KG/M2 | SYSTOLIC BLOOD PRESSURE: 136 MMHG | HEART RATE: 61 BPM

## 2025-05-07 DIAGNOSIS — Z01.419 WELL WOMAN EXAM WITH ROUTINE GYNECOLOGICAL EXAM: Primary | ICD-10-CM

## 2025-05-07 DIAGNOSIS — Z12.4 CERVICAL CANCER SCREENING: ICD-10-CM

## 2025-05-07 DIAGNOSIS — Z30.41 ENCOUNTER FOR SURVEILLANCE OF CONTRACEPTIVE PILLS: ICD-10-CM

## 2025-05-07 PROCEDURE — 99999 PR PBB SHADOW E&M-EST. PATIENT-LVL III: CPT | Mod: PBBFAC,,, | Performed by: OBSTETRICS & GYNECOLOGY

## 2025-05-07 RX ORDER — NORETHINDRONE ACETATE AND ETHINYL ESTRADIOL AND FERROUS FUMARATE 1MG-20(24)
1 KIT ORAL DAILY
Qty: 28 TABLET | Refills: 12 | Status: SHIPPED | OUTPATIENT
Start: 2025-05-07 | End: 2026-05-07

## 2025-05-07 NOTE — PROGRESS NOTES
Subjective:    Patient ID: Aye Caceres is a 24 y.o. y.o. female.     Chief Complaint: Annual Well Woman Exam     History of Present Illness:  Aye presents today for Annual Well Woman exam. She describes her menses as regular every month without intermenstrual spotting and periods are very heavy and lasting 7 days.She denies pelvic pain.  She denies breast tenderness, masses, nipple discharge. She denies GYN complaints. She denies difficulty with urination or bowel movements. She denies bloating, early satiety, or weight changes. She is sexually active. Contraception is by oral contraceptives (estrogen/progesterone).      Menstrual History:   Patient's last menstrual period was 2025..     OB History          2    Para   2    Term   1       1    AB   0    Living   2         SAB   0    IAB   0    Ectopic   0    Multiple   0    Live Births   2                 The following portions of the patient's history were reviewed and updated as appropriate: allergies, current medications, past family history, past medical history, past social history, past surgical history, and problem list.    ROS:   Review of Systems   Constitutional:  Positive for fatigue. Negative for chills, diaphoresis, fever and unexpected weight change.   HENT:  Negative for congestion, hearing loss, postnasal drip, rhinorrhea, sinus pressure, sinus pain, sore throat and tinnitus.    Eyes:  Negative for pain, discharge and visual disturbance.   Respiratory:  Negative for apnea, cough, shortness of breath and wheezing.    Cardiovascular:  Negative for chest pain, palpitations and leg swelling.   Gastrointestinal:  Positive for abdominal pain. Negative for constipation, diarrhea, nausea and vomiting.   Endocrine: Negative for cold intolerance, heat intolerance, polydipsia, polyphagia and polyuria.   Genitourinary:  Positive for vaginal bleeding. Negative for difficulty urinating, dyspareunia, dysuria, enuresis, flank  pain, frequency, genital sores, hematuria, menstrual problem, pelvic pain, urgency, vaginal discharge and vaginal pain.   Musculoskeletal:  Negative for arthralgias, back pain, joint swelling, myalgias, neck pain and neck stiffness.   Skin:  Negative for color change, pallor and rash.   Allergic/Immunologic: Negative for environmental allergies, food allergies and immunocompromised state.   Neurological:  Positive for light-headedness and headaches. Negative for dizziness, weakness and numbness.   Hematological:  Negative for adenopathy. Does not bruise/bleed easily.   Psychiatric/Behavioral:  Positive for agitation. Negative for confusion. The patient is not nervous/anxious.          Objective:    Vital Signs:  Vitals:    05/07/25 1222   BP: 136/84   Pulse: 61       Physical Exam:   Examination performed with Chaperone present  General:  alert, cooperative, no distress   Skin:  Skin color, texture, turgor normal. No rashes or lesions   HEENT:  extra ocular movement intact, sclera clear, anicteric   Neck: supple, trachea midline, no adenopathy or thyromegally   Respiratory:  Normal effort   Breasts:  no discharge, erythema, tenderness, or palpable masses; no axillary lymphadenopathy   Abdomen:  soft, nontender, no palpable masses   Pelvis: External genitalia: normal general appearance  Urinary system: urethral meatus normal, bladder nontender  Vaginal: normal mucosa without prolapse or lesions, presence of blood  Cervix: normal appearance  Uterus: normal size, shape, position  Adnexa: normal size, nontender bilaterally   Extremities: Normal ROM; no edema, no cyanosis   Neurologial: Normal strength and tone. No focal numbness or weakness.   Psychiatric: normal mood, speech, dress, and thought processes         Assessment:       Healthy female exam.     1. Well woman exam with routine gynecological exam    2. Cervical cancer screening    3. Encounter for surveillance of contraceptive pills          Plan:      Well  woman exam with routine gynecological exam    Cervical cancer screening  -     Liquid-Based Pap Smear, Screening    Encounter for surveillance of contraceptive pills  -     norethindrone-e.estradioL-iron (JUNEL FE 24) 1 mg-20 mcg (24)/75 mg (4) per tablet; Take 1 tablet by mouth once daily.  Dispense: 28 tablet; Refill: 12        COUNSELING:  Aye was counseled on use and side-effects of various contraceptive measures, A.C.O.G. Pap guidelines and recommendations for yearly pelvic exams in addition to recommendations for monthly self breast exams; to see her PCP for other health maintenance.

## 2025-05-15 ENCOUNTER — RESULTS FOLLOW-UP (OUTPATIENT)
Dept: OBSTETRICS AND GYNECOLOGY | Facility: CLINIC | Age: 25
End: 2025-05-15

## 2025-07-07 ENCOUNTER — PATIENT MESSAGE (OUTPATIENT)
Dept: OBSTETRICS AND GYNECOLOGY | Facility: CLINIC | Age: 25
End: 2025-07-07
Payer: COMMERCIAL

## 2025-07-22 ENCOUNTER — OFFICE VISIT (OUTPATIENT)
Dept: INTERNAL MEDICINE | Facility: CLINIC | Age: 25
End: 2025-07-22
Payer: COMMERCIAL

## 2025-07-22 ENCOUNTER — LAB VISIT (OUTPATIENT)
Dept: LAB | Facility: HOSPITAL | Age: 25
End: 2025-07-22
Attending: NURSE PRACTITIONER
Payer: COMMERCIAL

## 2025-07-22 VITALS
HEART RATE: 72 BPM | RESPIRATION RATE: 18 BRPM | HEIGHT: 65 IN | SYSTOLIC BLOOD PRESSURE: 108 MMHG | BODY MASS INDEX: 40.73 KG/M2 | WEIGHT: 244.5 LBS | DIASTOLIC BLOOD PRESSURE: 78 MMHG | OXYGEN SATURATION: 99 %

## 2025-07-22 DIAGNOSIS — R10.9 ABDOMINAL PAIN, UNSPECIFIED ABDOMINAL LOCATION: ICD-10-CM

## 2025-07-22 DIAGNOSIS — N92.6 MISSED PERIOD: Primary | ICD-10-CM

## 2025-07-22 DIAGNOSIS — N92.6 MISSED PERIOD: ICD-10-CM

## 2025-07-22 LAB
ABSOLUTE EOSINOPHIL (OHS): 0.15 K/UL
ABSOLUTE MONOCYTE (OHS): 0.55 K/UL (ref 0.3–1)
ABSOLUTE NEUTROPHIL COUNT (OHS): 4.07 K/UL (ref 1.8–7.7)
ALBUMIN SERPL BCP-MCNC: 3.6 G/DL (ref 3.5–5.2)
ALP SERPL-CCNC: 70 UNIT/L (ref 40–150)
ALT SERPL W/O P-5'-P-CCNC: 11 UNIT/L (ref 10–44)
ANION GAP (OHS): 7 MMOL/L (ref 8–16)
AST SERPL-CCNC: 13 UNIT/L (ref 11–45)
BASOPHILS # BLD AUTO: 0.05 K/UL
BASOPHILS NFR BLD AUTO: 0.6 %
BILIRUB SERPL-MCNC: 0.2 MG/DL (ref 0.1–1)
BUN SERPL-MCNC: 12 MG/DL (ref 6–20)
CALCIUM SERPL-MCNC: 9.6 MG/DL (ref 8.7–10.5)
CHLORIDE SERPL-SCNC: 106 MMOL/L (ref 95–110)
CO2 SERPL-SCNC: 25 MMOL/L (ref 23–29)
CREAT SERPL-MCNC: 0.7 MG/DL (ref 0.5–1.4)
ERYTHROCYTE [DISTWIDTH] IN BLOOD BY AUTOMATED COUNT: 13.3 % (ref 11.5–14.5)
GFR SERPLBLD CREATININE-BSD FMLA CKD-EPI: >60 ML/MIN/1.73/M2
GLUCOSE SERPL-MCNC: 79 MG/DL (ref 70–110)
HCG INTACT+B SERPL-ACNC: <1.2 MIU/ML
HCT VFR BLD AUTO: 38.6 % (ref 37–48.5)
HGB BLD-MCNC: 12.9 GM/DL (ref 12–16)
IMM GRANULOCYTES # BLD AUTO: 0.02 K/UL (ref 0–0.04)
IMM GRANULOCYTES NFR BLD AUTO: 0.3 % (ref 0–0.5)
LYMPHOCYTES # BLD AUTO: 2.95 K/UL (ref 1–4.8)
MCH RBC QN AUTO: 27.9 PG (ref 27–31)
MCHC RBC AUTO-ENTMCNC: 33.4 G/DL (ref 32–36)
MCV RBC AUTO: 84 FL (ref 82–98)
NUCLEATED RBC (/100WBC) (OHS): 0 /100 WBC
PLATELET # BLD AUTO: 288 K/UL (ref 150–450)
PMV BLD AUTO: 8.9 FL (ref 9.2–12.9)
POTASSIUM SERPL-SCNC: 4.3 MMOL/L (ref 3.5–5.1)
PROT SERPL-MCNC: 7.9 GM/DL (ref 6–8.4)
RBC # BLD AUTO: 4.62 M/UL (ref 4–5.4)
RELATIVE EOSINOPHIL (OHS): 1.9 %
RELATIVE LYMPHOCYTE (OHS): 37.9 % (ref 18–48)
RELATIVE MONOCYTE (OHS): 7.1 % (ref 4–15)
RELATIVE NEUTROPHIL (OHS): 52.2 % (ref 38–73)
SODIUM SERPL-SCNC: 138 MMOL/L (ref 136–145)
T4 FREE SERPL-MCNC: 0.88 NG/DL (ref 0.71–1.51)
TSH SERPL-ACNC: 1.42 UIU/ML (ref 0.4–4)
WBC # BLD AUTO: 7.79 K/UL (ref 3.9–12.7)

## 2025-07-22 PROCEDURE — 1159F MED LIST DOCD IN RCRD: CPT | Mod: CPTII,S$GLB,, | Performed by: NURSE PRACTITIONER

## 2025-07-22 PROCEDURE — 85025 COMPLETE CBC W/AUTO DIFF WBC: CPT

## 2025-07-22 PROCEDURE — 3008F BODY MASS INDEX DOCD: CPT | Mod: CPTII,S$GLB,, | Performed by: NURSE PRACTITIONER

## 2025-07-22 PROCEDURE — 99999 PR PBB SHADOW E&M-EST. PATIENT-LVL IV: CPT | Mod: PBBFAC,,, | Performed by: NURSE PRACTITIONER

## 2025-07-22 PROCEDURE — 3078F DIAST BP <80 MM HG: CPT | Mod: CPTII,S$GLB,, | Performed by: NURSE PRACTITIONER

## 2025-07-22 PROCEDURE — 84439 ASSAY OF FREE THYROXINE: CPT

## 2025-07-22 PROCEDURE — 36415 COLL VENOUS BLD VENIPUNCTURE: CPT

## 2025-07-22 PROCEDURE — 84702 CHORIONIC GONADOTROPIN TEST: CPT

## 2025-07-22 PROCEDURE — 99214 OFFICE O/P EST MOD 30 MIN: CPT | Mod: S$GLB,,, | Performed by: NURSE PRACTITIONER

## 2025-07-22 PROCEDURE — 84075 ASSAY ALKALINE PHOSPHATASE: CPT

## 2025-07-22 PROCEDURE — 3074F SYST BP LT 130 MM HG: CPT | Mod: CPTII,S$GLB,, | Performed by: NURSE PRACTITIONER

## 2025-07-22 NOTE — PROGRESS NOTES
Subjective:       Patient ID: Aye Caceres is a 25 y.o. female.    Chief Complaint: Fatigue and Abdominal Pain      History of Present Illness    CHIEF COMPLAINT:  Aye presents today for concerns about birth control side effects.    BIRTH CONTROL AND MENSTRUAL HISTORY:  She reports significant menstrual irregularities with current birth control containing sugar pills. She has not menstruated for several months, which is a change from previous excessive bleeding with prior birth control.     CURRENT SYMPTOMS:  She reports a constant, localized knot-like sensation on one side that is tender to touch both externally and internally. She has had persistent headaches for the past two weeks, which began after a recent birth control switch.    MEDICATIONS:  She is currently taking birth control and Lexapro. She was previously on Effexor and reports her condition has deteriorated since starting current birth control. Feels depressed      ROS:  Head: +headaches  Gastrointestinal: +abdominal pain  Female Genitourinary: +absent or irregular periods  Psychiatric: +depression          Objective:      Physical Exam    General: No acute distress. Well-developed. Well-nourished.  Eyes: EOMI. Sclerae anicteric.  HENT: Normocephalic. Atraumatic. Nares patent. Moist oral mucosa.  Ears: Bilateral TMs clear. Bilateral EACs clear.  Cardiovascular: Regular rate. Regular rhythm. No murmurs. No rubs. No gallops. Normal S1, S2.  Respiratory: Normal respiratory effort. Clear to auscultation bilaterally. No rales. No rhonchi. No wheezing.  Abdomen: Soft. Non-tender. Non-distended. Normoactive bowel sounds. Palpable mass in abdomen, likely fatty tissue.  Musculoskeletal: No  obvious deformity.  Extremities: No lower extremity edema.  Neurological: Alert & oriented x3. No slurred speech. Normal gait.  Psychiatric: Normal mood. Normal affect. Good insight. Good judgment.  Skin: Warm. Dry. No rash.          Assessment:       1. Missed  period    2. Abdominal pain, unspecified abdominal location        Plan:     Problem List Items Addressed This Visit    None  Visit Diagnoses         Missed period    -  Primary    Relevant Orders    TSH    HCG, Quantitative      Abdominal pain, unspecified abdominal location        Relevant Orders    CBC Auto Differential (Completed)    Comprehensive Metabolic Panel          Assessment & Plan    N91.2 Amenorrhea, unspecified  N94.89 Other specified conditions associated with female genital organs and menstrual cycle  R51.9 Headache, unspecified  R22.2 Localized swelling, mass and lump, trunk  F32.A Depression, unspecified  Z79.3 Long term (current) use of hormonal contraceptives    AMENORRHEA AND MENSTRUAL IRREGULARITIES:  - Monitored the patient who reports no period for several months since starting birth control.  - Aye has an inconsistent menstrual cycle with occasional periods and previously experienced excessive bleeding with prior birth control.  - Ordered a pregnancy test (HCG) and comprehensive bloodwork including thyroid function tests, complete blood count, and chemistry panel to rule out other causes.  - If lab results are normal, will switch birth control would be my recommendation but will forward note to Dr Clay for input     HEADACHE:  - Aye reports consistent headaches over the past 2 weeks since switching birth control.  - Will evaluate headaches further if they persist after switching birth control.  - Anticipate improvement of headaches with birth control adjustment.    LOCALIZED SWELLING OR MASS:  - Aye reports a painful persistent knot on her side.  - Upon evaluation, suspect it may be fatty tissue, possibly exacerbated by frequent palpation.  - Also considering ovarian cyst as a possible cause of the abdominal discomfort.  - Will order pelvic ultrasound if needed to evaluate the mass further, and will switch birth control if lab results are normal.    DEPRESSION:  -  Aye reports being in a major depressive slump since starting birth control.  - Suspect current birth control may be interacting with effexor.  - Plan to switch birth control to see if depressive symptoms improve.    HORMONAL CONTRACEPTIVE USE AND SIDE EFFECTS:  - Aye is currently on birth control and reports multiple side effects including inconsistent periods, headaches, and mood changes.  - Current birth control may be too potent or interacting with effexor.  - Will rule out other potential causes through ordered labs before changing contraceptive method.  - Plan to switch to a birth control that works better with Lexapro and allows for regular periods.  - Will refer to Dr. Clay for birth control adjustment if lab results are normal.    FOLLOW-UP:  - Scheduled follow-up after completion of ordered labs.  - Instructed the patient to contact the office if symptoms persist after switching birth control, particularly if still experiencing mood disturbances despite improved headaches and regular periods.          This note was generated with the assistance of ambient listening technology. Verbal consent was obtained by the patient and accompanying visitor(s) for the recording of patient appointment to facilitate this note. I attest to having reviewed and edited the generated note for accuracy, though some syntax or spelling errors may persist. Please contact the author of this note for any clarification.       Lab Results   Component Value Date    WBC 7.79 07/22/2025    HGB 12.9 07/22/2025    HCT 38.6 07/22/2025    MCV 84 07/22/2025     07/22/2025       BMP  Lab Results   Component Value Date     07/22/2025    K 4.3 07/22/2025     07/22/2025    CO2 25 07/22/2025    BUN 12 07/22/2025    CREATININE 0.7 07/22/2025    CALCIUM 9.6 07/22/2025    ANIONGAP 7 (L) 07/22/2025    EGFRNORACEVR >60 07/22/2025     Lab Results   Component Value Date    TSH In process      HCG <1.20    Note forwarded to  Dr Clay for her recommendations on switching birth control

## 2025-07-22 NOTE — Clinical Note
Good afternoon- I saw this patient today for abd pain (more bloating), missed periods, headaches and increased depression symptoms since switching her birth control- You put her on the Junel Fe after she was breakthrough bleeding with the previous OCP.(Ortho cyclen). I sent her to lab to be sure no pregnancy or other issues tsh is pending  but everything else looks ok- any rec on changing OCP?

## 2025-07-23 ENCOUNTER — PATIENT MESSAGE (OUTPATIENT)
Dept: INTERNAL MEDICINE | Facility: CLINIC | Age: 25
End: 2025-07-23
Payer: COMMERCIAL

## 2025-07-23 DIAGNOSIS — Z30.011 OCP (ORAL CONTRACEPTIVE PILLS) INITIATION: Primary | ICD-10-CM

## 2025-07-23 RX ORDER — NORGESTIMATE AND ETHINYL ESTRADIOL 7DAYSX3 28
1 KIT ORAL DAILY
Qty: 30 TABLET | Refills: 11 | Status: SHIPPED | OUTPATIENT
Start: 2025-07-23 | End: 2026-07-23